# Patient Record
Sex: MALE | Race: WHITE | NOT HISPANIC OR LATINO | Employment: UNEMPLOYED | URBAN - METROPOLITAN AREA
[De-identification: names, ages, dates, MRNs, and addresses within clinical notes are randomized per-mention and may not be internally consistent; named-entity substitution may affect disease eponyms.]

---

## 2023-04-10 ENCOUNTER — APPOINTMENT (OUTPATIENT)
Dept: PRIMARY CARE | Facility: CLINIC | Age: 58
End: 2023-04-10
Payer: COMMERCIAL

## 2023-04-10 PROBLEM — M54.59 INTRACTABLE LOW BACK PAIN: Status: ACTIVE | Noted: 2023-04-10

## 2023-04-10 PROBLEM — R29.898 LOWER EXTREMITY WEAKNESS: Status: ACTIVE | Noted: 2023-04-10

## 2023-04-10 PROBLEM — S22.070D COMPRESSION FRACTURE OF T9 VERTEBRA WITH ROUTINE HEALING: Status: ACTIVE | Noted: 2023-04-10

## 2023-04-10 PROBLEM — J30.89 ENVIRONMENTAL AND SEASONAL ALLERGIES: Status: ACTIVE | Noted: 2023-04-10

## 2023-04-10 PROBLEM — I10 ESSENTIAL HYPERTENSION: Status: ACTIVE | Noted: 2023-04-10

## 2023-04-10 PROBLEM — D64.9 ANEMIA: Status: ACTIVE | Noted: 2023-04-10

## 2023-04-10 PROBLEM — F32.A ANXIETY AND DEPRESSION: Status: ACTIVE | Noted: 2023-04-10

## 2023-04-10 PROBLEM — K58.0 IRRITABLE BOWEL SYNDROME WITH DIARRHEA: Status: ACTIVE | Noted: 2023-04-10

## 2023-04-10 PROBLEM — R51.9 HEADACHE: Status: ACTIVE | Noted: 2023-04-10

## 2023-04-10 PROBLEM — F41.9 ANXIETY AND DEPRESSION: Status: ACTIVE | Noted: 2023-04-10

## 2023-04-10 RX ORDER — AMLODIPINE BESYLATE 5 MG/1
1 TABLET ORAL DAILY
COMMUNITY
Start: 2022-05-26 | End: 2024-02-08

## 2023-04-10 RX ORDER — FLUTICASONE PROPIONATE 50 MCG
2 SPRAY, SUSPENSION (ML) NASAL DAILY
COMMUNITY
Start: 2020-11-12 | End: 2023-08-02

## 2023-04-10 RX ORDER — ESCITALOPRAM OXALATE 10 MG/1
1 TABLET ORAL DAILY
COMMUNITY
Start: 2022-05-26 | End: 2023-07-28 | Stop reason: SDUPTHER

## 2023-04-10 NOTE — ASSESSMENT & PLAN NOTE
Lisinopril: near-syncope. Hydralazine: syncope. Hydrochlorothiazide: syncope  Borderline control. Continue amlodipine at current dose of 5mg. Monitor and record blood pressure (BP) and heart rate (HR) at home.  Did not tolerate several other antihypertensives, due to syncope.

## 2023-04-17 ENCOUNTER — APPOINTMENT (OUTPATIENT)
Dept: PRIMARY CARE | Facility: CLINIC | Age: 58
End: 2023-04-17
Payer: COMMERCIAL

## 2023-07-28 DIAGNOSIS — F32.A ANXIETY AND DEPRESSION: ICD-10-CM

## 2023-07-28 DIAGNOSIS — F41.9 ANXIETY AND DEPRESSION: ICD-10-CM

## 2023-07-28 RX ORDER — ESCITALOPRAM OXALATE 10 MG/1
10 TABLET ORAL DAILY
Qty: 30 TABLET | Refills: 0 | Status: SHIPPED | OUTPATIENT
Start: 2023-07-28 | End: 2023-10-30

## 2023-07-28 NOTE — TELEPHONE ENCOUNTER
MEDICATION REFILL    Pharmacy Name:  [facility]  Medication requested   Escitalopram 10 mg  Dosage   1x daily  Quantity   5 day hold-over until ov on 8/1  Allergies     Date of last visit     02/2023  Date of Next Appointment   08/01/2023

## 2023-08-01 ENCOUNTER — APPOINTMENT (OUTPATIENT)
Dept: PRIMARY CARE | Facility: CLINIC | Age: 58
End: 2023-08-01

## 2023-08-02 DIAGNOSIS — J30.89 OTHER ALLERGIC RHINITIS: ICD-10-CM

## 2023-08-02 RX ORDER — FLUTICASONE PROPIONATE 50 MCG
SPRAY, SUSPENSION (ML) NASAL
Qty: 48 ML | Refills: 1 | Status: SHIPPED | OUTPATIENT
Start: 2023-08-02

## 2023-08-03 ENCOUNTER — OFFICE VISIT (OUTPATIENT)
Dept: PRIMARY CARE | Facility: CLINIC | Age: 58
End: 2023-08-03
Payer: COMMERCIAL

## 2023-08-03 VITALS
DIASTOLIC BLOOD PRESSURE: 75 MMHG | SYSTOLIC BLOOD PRESSURE: 135 MMHG | WEIGHT: 232.13 LBS | OXYGEN SATURATION: 96 % | BODY MASS INDEX: 33.23 KG/M2 | HEIGHT: 70 IN | HEART RATE: 64 BPM

## 2023-08-03 DIAGNOSIS — Z12.5 SCREENING FOR PROSTATE CANCER: Primary | ICD-10-CM

## 2023-08-03 DIAGNOSIS — I10 ESSENTIAL HYPERTENSION: ICD-10-CM

## 2023-08-03 DIAGNOSIS — F32.A ANXIETY AND DEPRESSION: ICD-10-CM

## 2023-08-03 DIAGNOSIS — F41.9 ANXIETY AND DEPRESSION: ICD-10-CM

## 2023-08-03 DIAGNOSIS — R73.9 HYPERGLYCEMIA: ICD-10-CM

## 2023-08-03 DIAGNOSIS — Z11.59 NEED FOR HEPATITIS C SCREENING TEST: ICD-10-CM

## 2023-08-03 PROCEDURE — 1036F TOBACCO NON-USER: CPT | Performed by: FAMILY MEDICINE

## 2023-08-03 PROCEDURE — 99214 OFFICE O/P EST MOD 30 MIN: CPT | Performed by: FAMILY MEDICINE

## 2023-08-03 PROCEDURE — 3078F DIAST BP <80 MM HG: CPT | Performed by: FAMILY MEDICINE

## 2023-08-03 PROCEDURE — 3075F SYST BP GE 130 - 139MM HG: CPT | Performed by: FAMILY MEDICINE

## 2023-08-03 ASSESSMENT — ENCOUNTER SYMPTOMS
DIARRHEA: 0
ABDOMINAL PAIN: 0
DIFFICULTY URINATING: 0
FATIGUE: 1
NAUSEA: 0
COUGH: 0
WEAKNESS: 0
DYSURIA: 0
WHEEZING: 0
CHILLS: 0
CONFUSION: 0
NUMBNESS: 0
DIZZINESS: 0
LIGHT-HEADEDNESS: 0
VOMITING: 0
FEVER: 0
UNEXPECTED WEIGHT CHANGE: 0
TROUBLE SWALLOWING: 0
BLOOD IN STOOL: 0
SHORTNESS OF BREATH: 0

## 2023-08-03 NOTE — PROGRESS NOTES
"Subjective   Patient ID: Clifford Recinos is a 58 y.o. male who presents for lab follow up (Pt presents with spouse in lab F/U, medication review, no refills needed.BL).  HPI    C/o persistent pain, fatigue. A bit better. Feels better with taking blackstrap molasses.       Review of Systems   Constitutional:  Positive for fatigue. Negative for chills, fever and unexpected weight change.   HENT:  Negative for ear pain and trouble swallowing.    Respiratory:  Negative for cough, shortness of breath and wheezing.    Cardiovascular:  Negative for chest pain.   Gastrointestinal:  Negative for abdominal pain, blood in stool, diarrhea, nausea and vomiting.   Genitourinary:  Negative for difficulty urinating and dysuria.   Skin:  Negative for rash.   Neurological:  Negative for dizziness, syncope, weakness, light-headedness and numbness.   Psychiatric/Behavioral:  Negative for behavioral problems and confusion.          Objective   /75   Pulse 64   Ht 1.765 m (5' 9.5\")   Wt 105 kg (232 lb 2 oz)   SpO2 96%   BMI 33.79 kg/m²     Physical Exam  Vitals and nursing note reviewed.   Constitutional:       General: He is not in acute distress.     Appearance: Normal appearance.   HENT:      Head: Normocephalic and atraumatic.   Eyes:      General: No scleral icterus.     Extraocular Movements: Extraocular movements intact.      Conjunctiva/sclera: Conjunctivae normal.   Pulmonary:      Effort: Pulmonary effort is normal. No respiratory distress.   Skin:     General: Skin is warm and dry.      Coloration: Skin is not jaundiced.   Neurological:      Mental Status: He is alert and oriented to person, place, and time. Mental status is at baseline.   Psychiatric:         Mood and Affect: Mood normal.         Thought Content: Thought content normal.         Assessment/Plan   Problem List Items Addressed This Visit       Anxiety and depression    Essential hypertension    Relevant Orders    Comprehensive Metabolic Panel    TSH with " reflex to Free T4 if abnormal    Magnesium    Lipid Panel    Need for hepatitis C screening test    Screening for prostate cancer - Primary    Relevant Orders    Prostate Specific Antigen, Screen    Hyperglycemia    Relevant Orders    Hemoglobin A1C

## 2023-08-03 NOTE — PATIENT INSTRUCTIONS
Please return for a follow-up appointment and Wellness visit in 3 months, earlier if any question or concern.     For assistance with scheduling referrals or consultations, please call [phone]. For laboratory, radiology, and other tests, please call [phone] ([phone] for pediatrics). Please review prescription inserts and published information for possible adverse effects. Return after testing or consultation to review results and recommendations, if symptoms persist, change, worsen, or return, or if you have any question or concern. For non-emergencies, you may call the office. For emergencies, call 9-1-1 or go to the nearest Emergency Department. Please schedule additional appointment(s) to address concern(s) not addressed today.    In general, results will not be released or discussed over the telephone. Results of tests done through [facility] are released via  SimilarWeb:  [email or web address]    Until we complete our transition to the new system, additional information can be found at [email or web address] or on your Android or iOS (iPhone, iPad) device using the [employer] neal available free of charge in your device's neal store.

## 2023-10-28 DIAGNOSIS — F41.9 ANXIETY AND DEPRESSION: ICD-10-CM

## 2023-10-28 DIAGNOSIS — F32.A ANXIETY AND DEPRESSION: ICD-10-CM

## 2023-10-30 PROBLEM — M79.674 PAIN IN TOES OF BOTH FEET: Status: RESOLVED | Noted: 2019-10-14 | Resolved: 2023-10-30

## 2023-10-30 PROBLEM — S92.502A FRACTURE OF FOURTH TOE, LEFT, CLOSED, INITIAL ENCOUNTER: Status: RESOLVED | Noted: 2019-10-18 | Resolved: 2023-10-30

## 2023-10-30 PROBLEM — J45.909 ASTHMA WITHOUT STATUS ASTHMATICUS (HHS-HCC): Status: ACTIVE | Noted: 2023-10-30

## 2023-10-30 PROBLEM — S92.502D: Status: RESOLVED | Noted: 2019-10-14 | Resolved: 2023-10-30

## 2023-10-30 PROBLEM — B35.1 ONYCHOMYCOSIS: Status: ACTIVE | Noted: 2019-10-14

## 2023-10-30 PROBLEM — M79.675 PAIN IN TOES OF BOTH FEET: Status: RESOLVED | Noted: 2019-10-14 | Resolved: 2023-10-30

## 2023-10-30 RX ORDER — ESCITALOPRAM OXALATE 10 MG/1
10 TABLET ORAL DAILY
Qty: 90 TABLET | Refills: 1 | Status: SHIPPED | OUTPATIENT
Start: 2023-10-30 | End: 2024-05-15

## 2023-12-28 ENCOUNTER — APPOINTMENT (OUTPATIENT)
Dept: LAB | Facility: HOSPITAL | Age: 58
End: 2023-12-28
Payer: COMMERCIAL

## 2023-12-28 ENCOUNTER — OFFICE VISIT (OUTPATIENT)
Dept: HEMATOLOGY/ONCOLOGY | Facility: CLINIC | Age: 58
End: 2023-12-28
Payer: COMMERCIAL

## 2023-12-28 VITALS
TEMPERATURE: 98.2 F | DIASTOLIC BLOOD PRESSURE: 90 MMHG | BODY MASS INDEX: 33.21 KG/M2 | HEIGHT: 71 IN | RESPIRATION RATE: 16 BRPM | HEART RATE: 66 BPM | WEIGHT: 237.22 LBS | OXYGEN SATURATION: 95 % | SYSTOLIC BLOOD PRESSURE: 177 MMHG

## 2023-12-28 DIAGNOSIS — Z12.5 SCREENING FOR PROSTATE CANCER: ICD-10-CM

## 2023-12-28 DIAGNOSIS — I10 ESSENTIAL HYPERTENSION: ICD-10-CM

## 2023-12-28 DIAGNOSIS — D64.9 ANEMIA, UNSPECIFIED TYPE: Primary | ICD-10-CM

## 2023-12-28 DIAGNOSIS — R73.9 HYPERGLYCEMIA: ICD-10-CM

## 2023-12-28 LAB
ALBUMIN SERPL BCP-MCNC: 4.6 G/DL (ref 3.4–5)
ALP SERPL-CCNC: 45 U/L (ref 33–120)
ALT SERPL W P-5'-P-CCNC: 21 U/L (ref 10–52)
ANION GAP SERPL CALC-SCNC: 14 MMOL/L (ref 10–20)
AST SERPL W P-5'-P-CCNC: 18 U/L (ref 9–39)
BASOPHILS # BLD AUTO: 0.04 X10*3/UL (ref 0–0.1)
BASOPHILS NFR BLD AUTO: 1.1 %
BILIRUB SERPL-MCNC: 0.7 MG/DL (ref 0–1.2)
BUN SERPL-MCNC: 12 MG/DL (ref 6–23)
CALCIUM SERPL-MCNC: 9.5 MG/DL (ref 8.6–10.3)
CHLORIDE SERPL-SCNC: 103 MMOL/L (ref 98–107)
CHOLEST SERPL-MCNC: 145 MG/DL (ref 0–199)
CHOLESTEROL/HDL RATIO: 2.8
CO2 SERPL-SCNC: 26 MMOL/L (ref 21–32)
CREAT SERPL-MCNC: 0.58 MG/DL (ref 0.5–1.3)
EOSINOPHIL # BLD AUTO: 0.09 X10*3/UL (ref 0–0.7)
EOSINOPHIL NFR BLD AUTO: 2.5 %
ERYTHROCYTE [DISTWIDTH] IN BLOOD BY AUTOMATED COUNT: 14.8 % (ref 11.5–14.5)
FERRITIN SERPL-MCNC: 236 NG/ML (ref 20–300)
GFR SERPL CREATININE-BSD FRML MDRD: >90 ML/MIN/1.73M*2
GLUCOSE SERPL-MCNC: 200 MG/DL (ref 74–99)
HCT VFR BLD AUTO: 33.5 % (ref 41–52)
HDLC SERPL-MCNC: 51.5 MG/DL
HGB BLD-MCNC: 11 G/DL (ref 13.5–17.5)
IMM GRANULOCYTES # BLD AUTO: 0.02 X10*3/UL (ref 0–0.7)
IMM GRANULOCYTES NFR BLD AUTO: 0.5 % (ref 0–0.9)
IRON SATN MFR SERPL: 36 % (ref 25–45)
IRON SERPL-MCNC: 118 UG/DL (ref 35–150)
LDLC SERPL CALC-MCNC: 58 MG/DL
LYMPHOCYTES # BLD AUTO: 0.71 X10*3/UL (ref 1.2–4.8)
LYMPHOCYTES NFR BLD AUTO: 19.3 %
MAGNESIUM SERPL-MCNC: 1.99 MG/DL (ref 1.6–2.4)
MCH RBC QN AUTO: 34 PG (ref 26–34)
MCHC RBC AUTO-ENTMCNC: 32.8 G/DL (ref 32–36)
MCV RBC AUTO: 103 FL (ref 80–100)
MONOCYTES # BLD AUTO: 0.22 X10*3/UL (ref 0.1–1)
MONOCYTES NFR BLD AUTO: 6 %
NEUTROPHILS # BLD AUTO: 2.59 X10*3/UL (ref 1.2–7.7)
NEUTROPHILS NFR BLD AUTO: 70.6 %
NON HDL CHOLESTEROL: 94 MG/DL (ref 0–149)
PLATELET # BLD AUTO: 346 X10*3/UL (ref 150–450)
POTASSIUM SERPL-SCNC: 3.9 MMOL/L (ref 3.5–5.3)
PROT SERPL-MCNC: 6.9 G/DL (ref 6.4–8.2)
RBC # BLD AUTO: 3.24 X10*6/UL (ref 4.5–5.9)
SODIUM SERPL-SCNC: 139 MMOL/L (ref 136–145)
TIBC SERPL-MCNC: 325 UG/DL (ref 240–445)
TRIGL SERPL-MCNC: 180 MG/DL (ref 0–149)
TSH SERPL-ACNC: 1.1 MIU/L (ref 0.44–3.98)
UIBC SERPL-MCNC: 207 UG/DL (ref 110–370)
VLDL: 36 MG/DL (ref 0–40)
WBC # BLD AUTO: 3.7 X10*3/UL (ref 4.4–11.3)

## 2023-12-28 PROCEDURE — 83540 ASSAY OF IRON: CPT | Performed by: PHYSICIAN ASSISTANT

## 2023-12-28 PROCEDURE — 85025 COMPLETE CBC W/AUTO DIFF WBC: CPT | Performed by: PHYSICIAN ASSISTANT

## 2023-12-28 PROCEDURE — 99214 OFFICE O/P EST MOD 30 MIN: CPT | Performed by: PHYSICIAN ASSISTANT

## 2023-12-28 PROCEDURE — 80053 COMPREHEN METABOLIC PANEL: CPT | Performed by: PHYSICIAN ASSISTANT

## 2023-12-28 PROCEDURE — 82728 ASSAY OF FERRITIN: CPT | Performed by: PHYSICIAN ASSISTANT

## 2023-12-28 PROCEDURE — 82607 VITAMIN B-12: CPT | Performed by: PHYSICIAN ASSISTANT

## 2023-12-28 PROCEDURE — 3080F DIAST BP >= 90 MM HG: CPT | Performed by: PHYSICIAN ASSISTANT

## 2023-12-28 PROCEDURE — 1036F TOBACCO NON-USER: CPT | Performed by: PHYSICIAN ASSISTANT

## 2023-12-28 PROCEDURE — 84153 ASSAY OF PSA TOTAL: CPT | Performed by: PHYSICIAN ASSISTANT

## 2023-12-28 PROCEDURE — 83036 HEMOGLOBIN GLYCOSYLATED A1C: CPT | Performed by: PHYSICIAN ASSISTANT

## 2023-12-28 PROCEDURE — 3077F SYST BP >= 140 MM HG: CPT | Performed by: PHYSICIAN ASSISTANT

## 2023-12-28 PROCEDURE — 80061 LIPID PANEL: CPT | Performed by: PHYSICIAN ASSISTANT

## 2023-12-28 PROCEDURE — 83735 ASSAY OF MAGNESIUM: CPT | Performed by: PHYSICIAN ASSISTANT

## 2023-12-28 PROCEDURE — 84443 ASSAY THYROID STIM HORMONE: CPT | Performed by: PHYSICIAN ASSISTANT

## 2023-12-28 PROCEDURE — 36415 COLL VENOUS BLD VENIPUNCTURE: CPT | Performed by: PHYSICIAN ASSISTANT

## 2023-12-28 ASSESSMENT — PAIN SCALES - GENERAL: PAINLEVEL: 7

## 2023-12-28 NOTE — PROGRESS NOTES
Patient Visit Information:   Visit Type: Benign Heme Follow-up     Cancer History:   Treatment Synopsis:    pt is a pleasant 57-year-old  male was referred by Dr. De for evaluation of leukopenia and normocytic anemia     pt has been feeling tired for many years and has been progressively worsened. c/o leg and join and whole body muscle achiness. had lots of fracture in the past.     cbc in 6/2022 showed hgb 11.3, down from 12.5 in 4/2022. stated was also found to have anemic 2 years ago at [facility].    denies bleeding. last colonoscopy was maybe 6 yrs ago per pt. otherwise doing fine denies fever chills night sweats decreased appetite weight loss palpitation chest pain nausea vomiting diarrhea constipation abdominal pain or urinary symptoms    Past medical history anxiety depression, hypertension, back pain, irritable bowel syndrome, compression fracture of T9 vertebra,     Past surgical history appendectomy cholecystectomy    Family history prostate cancer      History of Present Illness:   pt has been feeling tired for many years and has been progressively worsening. Having flares of IBS that worsen his fatigue. Changed from Zoloft to Lexapro due to cost and doesn't seem to be helping him. C/F cardiac issue due to low BP and HR. c/o leg and join and whole body muscle achiness. Didn't go to his rheum appointment. Otherwise doing well.     Review of Systems:    All of the systems have been reviewed and are negative for complaints except what is stated in the HPI and/or past medical history    Allergies and Intolerances:       Allergies:   nsaids: Drug, Mood Alteration, Active   doxycycline: Drug, Other, Active       Intolerances:   Percocet: Drug, Mood Alteration, Active    Outpatient Medication Profile:  * Patient Currently Takes Medications as of 03-Jul-2023 14:08 documented in Structured Notes   escitalopram 10 mg oral tablet: Last Dose Taken:  , 1 tab(s) orally once a day   amLODIPine 5 mg oral tablet:  "Last Dose Taken:  , 1 tab(s) orally once a day      Family History: No Family History items are recorded in the problem list.     Social History:   Social Substance History:  · Smoking Status never smoker (1)   · Tobacco Use denies   · Alcohol Use denies     Visit Vitals  /90 (BP Location: Left arm)   Pulse 66   Temp 36.8 °C (98.2 °F)   Resp 16   Ht 1.801 m (5' 10.91\")   Wt 108 kg (237 lb 3.4 oz)   SpO2 95%   BMI 33.17 kg/m²   Smoking Status Never   BSA 2.32 m²      PHYSICAL EXAM:  Constitutional: alert, awake and oriented, not in acute distress   HEENT: moist mucous membranes, normal nose   Neck: supple, no lymphadenopathy   EYES: PERRL, EOM intact, conjunctiva normal  Skin: no jaundice, rash or erythema  Neurological: AAOx3, no gross focal deficit   Psychiatric: normal mood and behavior     Labs:  Lab Results   Component Value Date    WBC 3.7 (L) 12/28/2023    NEUTROABS 2.59 12/28/2023    IGABSOL 0.02 12/28/2023    LYMPHSABS 0.71 (L) 12/28/2023    MONOSABS 0.22 12/28/2023    EOSABS 0.09 12/28/2023    BASOSABS 0.04 12/28/2023    RBC 3.24 (L) 12/28/2023     (H) 12/28/2023    MCHC 32.8 12/28/2023    HGB 11.0 (L) 12/28/2023    HCT 33.5 (L) 12/28/2023     12/28/2023     Lab Results   Component Value Date    RETICCTPCT 2.0 08/10/2022      Lab Results   Component Value Date    CREATININE 0.58 12/28/2023    BUN 12 12/28/2023    EGFR >90 12/28/2023     12/28/2023    K 3.9 12/28/2023     12/28/2023    CO2 26 12/28/2023      Lab Results   Component Value Date    ALT 21 12/28/2023    AST 18 12/28/2023    ALKPHOS 45 12/28/2023    BILITOT 0.7 12/28/2023      Lab Results   Component Value Date    TSH 1.10 12/28/2023     Lab Results   Component Value Date    TSH 1.10 12/28/2023     Lab Results   Component Value Date    IRON 118 12/28/2023    TIBC 325 12/28/2023    FERRITIN 236 12/28/2023      Lab Results   Component Value Date    QCYHVABU38 775 07/03/2023      Lab Results   Component Value Date    " "FOLATE >24.0 08/10/2022     Lab Results   Component Value Date    KENIA POSITIVE (A) 08/10/2022    RF <10 01/09/2023    SEDRATE 10 08/10/2022      Lab Results   Component Value Date    CRP 0.40 08/10/2022      No results found for: \"TIFFANIE\"  Lab Results   Component Value Date     08/10/2022     No results found for: \"HAPTOGLOBIN\"  Lab Results   Component Value Date    SPEP NORMAL 08/10/2022       Assessment:    57-year-old  male presented with long standing h/o normocytic anemia and leukopenia mainly lymphocytopenia    Compression fracture of thoracic spine    Fatigue disproportional to the degree of the anemia, weakness and body achiness, +KENIA      Plan:    Discussed with the patient and wife in detail regarding diagnosis etiologies of anemia    Cards and GI referral    F/u w/ophtho     will consider BM bx if hgb lower than 10 or if no other etiology has been found for his fatigue. Currently having no B symptoms.     Will repeat CBC in 6 m    f/u w/PCP    Patient verbalized understanding, and all his questions were answered to his satisfaction    RTC in 6 months    30 min spent with patient greater than 50 % of which was spent in consultation and coordination of care.  "

## 2023-12-29 ENCOUNTER — TELEPHONE (OUTPATIENT)
Dept: PRIMARY CARE | Facility: CLINIC | Age: 58
End: 2023-12-29

## 2023-12-29 DIAGNOSIS — R73.9 HYPERGLYCEMIA: Primary | ICD-10-CM

## 2023-12-29 LAB
EST. AVERAGE GLUCOSE BLD GHB EST-MCNC: 108 MG/DL
HBA1C MFR BLD: 5.4 %
PSA SERPL-MCNC: <0.1 NG/ML
VIT B12 SERPL-MCNC: 749 PG/ML (ref 211–911)

## 2023-12-29 NOTE — TELEPHONE ENCOUNTER
----- Message from Lacho Hart DO sent at 12/29/2023  1:08 AM EST -----  Please let patient know that his glucose is elevated to the diabetic range, but his A1c is normal. Recommend a low-sugar, low-simple-carbohydrate, low-cholesterol, heart-healthy diet and lifestyle, and regular cardio exercise and weight loss as appropriate.    Recommend a glucose tolerance test now (eat his normal diet in the days leading up to the test, then fast for 8 hours before the test), and rechecking his A1c in 3 months, with a follow-up appointment a couple days after tests, to review results and options, earlier if any question or concern.

## 2024-01-10 ENCOUNTER — TELEPHONE (OUTPATIENT)
Dept: PRIMARY CARE | Facility: CLINIC | Age: 59
End: 2024-01-10
Payer: COMMERCIAL

## 2024-01-10 NOTE — TELEPHONE ENCOUNTER
----- Message from Lacho Hart DO sent at 12/31/2023 10:26 PM EST -----  Recommend a follow-up appointment.  ----- Message -----  From: Mag Quinteros MA  Sent: 12/29/2023  12:25 PM EST  To: Lacho Hart DO    Pt did eat prior to getting labs drawn  Pt has a lot questions for you about the undetectable PSA  ----- Message -----  From: Lacho Hart DO  Sent: 12/29/2023  11:52 AM EST  To: #    PSA (prostate-specific antigen) is undetectable, but I don't see in his chart that he has had his prostate removed. Has he in fact had a total prostatectomy? If not, then recommend follow-up with an endocrinologist.    Endocrinology  Dr. Harish Henry ([facility] [phone]  Dr. Govind Antonio, Dr. Isis Pritchett, Dr. Xochilt Poole ([place]) [phone]  Shelly Blair CNP, Dr. Gabriel Sharma, Dr. Tasha Leal, Dr. [facility] [phone]

## 2024-01-11 ENCOUNTER — OFFICE VISIT (OUTPATIENT)
Dept: CARDIOLOGY | Facility: HOSPITAL | Age: 59
End: 2024-01-11
Payer: COMMERCIAL

## 2024-01-11 VITALS
SYSTOLIC BLOOD PRESSURE: 157 MMHG | WEIGHT: 239.2 LBS | BODY MASS INDEX: 33.45 KG/M2 | HEART RATE: 53 BPM | DIASTOLIC BLOOD PRESSURE: 79 MMHG | OXYGEN SATURATION: 97 %

## 2024-01-11 DIAGNOSIS — D64.9 ANEMIA, UNSPECIFIED TYPE: ICD-10-CM

## 2024-01-11 DIAGNOSIS — I49.3 VENTRICULAR PREMATURE DEPOLARIZATION: ICD-10-CM

## 2024-01-11 DIAGNOSIS — I10 ESSENTIAL HYPERTENSION: Primary | ICD-10-CM

## 2024-01-11 DIAGNOSIS — Z12.5 SCREENING FOR PROSTATE CANCER: ICD-10-CM

## 2024-01-11 DIAGNOSIS — I49.3 PVC (PREMATURE VENTRICULAR CONTRACTION): ICD-10-CM

## 2024-01-11 DIAGNOSIS — R00.2 PALPITATIONS: ICD-10-CM

## 2024-01-11 DIAGNOSIS — R73.9 HYPERGLYCEMIA: ICD-10-CM

## 2024-01-11 PROCEDURE — 93005 ELECTROCARDIOGRAM TRACING: CPT | Performed by: INTERNAL MEDICINE

## 2024-01-11 PROCEDURE — 3078F DIAST BP <80 MM HG: CPT | Performed by: INTERNAL MEDICINE

## 2024-01-11 PROCEDURE — 99204 OFFICE O/P NEW MOD 45 MIN: CPT | Performed by: INTERNAL MEDICINE

## 2024-01-11 PROCEDURE — 99214 OFFICE O/P EST MOD 30 MIN: CPT | Mod: 25 | Performed by: INTERNAL MEDICINE

## 2024-01-11 PROCEDURE — 93010 ELECTROCARDIOGRAM REPORT: CPT | Performed by: INTERNAL MEDICINE

## 2024-01-11 PROCEDURE — 99213 OFFICE O/P EST LOW 20 MIN: CPT | Performed by: INTERNAL MEDICINE

## 2024-01-11 PROCEDURE — 1036F TOBACCO NON-USER: CPT | Performed by: INTERNAL MEDICINE

## 2024-01-11 PROCEDURE — 3077F SYST BP >= 140 MM HG: CPT | Performed by: INTERNAL MEDICINE

## 2024-02-01 ENCOUNTER — HOSPITAL ENCOUNTER (OUTPATIENT)
Dept: CARDIOLOGY | Facility: HOSPITAL | Age: 59
Discharge: HOME | End: 2024-02-01
Payer: COMMERCIAL

## 2024-02-01 DIAGNOSIS — I49.3 PVC (PREMATURE VENTRICULAR CONTRACTION): ICD-10-CM

## 2024-02-01 DIAGNOSIS — I49.3 VENTRICULAR PREMATURE DEPOLARIZATION: ICD-10-CM

## 2024-02-01 LAB
AORTIC VALVE MEAN GRADIENT: 5 MMHG
AORTIC VALVE PEAK VELOCITY: 1.39 M/S
AV PEAK GRADIENT: 7.7 MMHG
AVA (PEAK VEL): 2.28 CM2
AVA (VTI): 2.29 CM2
EJECTION FRACTION APICAL 4 CHAMBER: 65.1
EJECTION FRACTION: 63 %
LEFT ATRIUM VOLUME AREA LENGTH INDEX BSA: 35.8 ML/M2
LEFT VENTRICLE INTERNAL DIMENSION DIASTOLE: 5.8 CM (ref 3.5–6)
LEFT VENTRICULAR OUTFLOW TRACT DIAMETER: 2 CM
MITRAL VALVE E/A RATIO: 2.33
MITRAL VALVE E/E' RATIO: 9.01
RIGHT VENTRICLE FREE WALL PEAK S': 16.2 CM/S
RIGHT VENTRICLE PEAK SYSTOLIC PRESSURE: 31.9 MMHG
TRICUSPID ANNULAR PLANE SYSTOLIC EXCURSION: 3.3 CM

## 2024-02-01 PROCEDURE — 93306 TTE W/DOPPLER COMPLETE: CPT | Performed by: INTERNAL MEDICINE

## 2024-02-01 PROCEDURE — 93018 CV STRESS TEST I&R ONLY: CPT | Performed by: STUDENT IN AN ORGANIZED HEALTH CARE EDUCATION/TRAINING PROGRAM

## 2024-02-01 PROCEDURE — 93306 TTE W/DOPPLER COMPLETE: CPT

## 2024-02-01 PROCEDURE — 93017 CV STRESS TEST TRACING ONLY: CPT

## 2024-02-01 PROCEDURE — 93016 CV STRESS TEST SUPVJ ONLY: CPT | Performed by: STUDENT IN AN ORGANIZED HEALTH CARE EDUCATION/TRAINING PROGRAM

## 2024-02-06 ENCOUNTER — OFFICE VISIT (OUTPATIENT)
Dept: PRIMARY CARE | Facility: CLINIC | Age: 59
End: 2024-02-06
Payer: COMMERCIAL

## 2024-02-06 VITALS
HEIGHT: 70 IN | WEIGHT: 241.6 LBS | BODY MASS INDEX: 34.59 KG/M2 | TEMPERATURE: 98.3 F | DIASTOLIC BLOOD PRESSURE: 80 MMHG | OXYGEN SATURATION: 97 % | HEART RATE: 74 BPM | SYSTOLIC BLOOD PRESSURE: 136 MMHG

## 2024-02-06 DIAGNOSIS — J01.40 ACUTE NON-RECURRENT PANSINUSITIS: Primary | ICD-10-CM

## 2024-02-06 DIAGNOSIS — R97.20 ABNORMAL PSA: ICD-10-CM

## 2024-02-06 PROCEDURE — 1036F TOBACCO NON-USER: CPT | Performed by: FAMILY MEDICINE

## 2024-02-06 PROCEDURE — 3075F SYST BP GE 130 - 139MM HG: CPT | Performed by: FAMILY MEDICINE

## 2024-02-06 PROCEDURE — 3079F DIAST BP 80-89 MM HG: CPT | Performed by: FAMILY MEDICINE

## 2024-02-06 PROCEDURE — 99214 OFFICE O/P EST MOD 30 MIN: CPT | Performed by: FAMILY MEDICINE

## 2024-02-06 RX ORDER — CLONAZEPAM 1 MG/1
1 TABLET ORAL 2 TIMES DAILY PRN
COMMUNITY
Start: 2023-11-13

## 2024-02-06 RX ORDER — AMOXICILLIN AND CLAVULANATE POTASSIUM 875; 125 MG/1; MG/1
875 TABLET, FILM COATED ORAL 2 TIMES DAILY
Qty: 20 TABLET | Refills: 0 | Status: SHIPPED | OUTPATIENT
Start: 2024-02-06 | End: 2024-02-16

## 2024-02-06 ASSESSMENT — ENCOUNTER SYMPTOMS
SHORTNESS OF BREATH: 0
ABDOMINAL PAIN: 0
DIFFICULTY URINATING: 0
TROUBLE SWALLOWING: 0
SORE THROAT: 0
VOMITING: 0
CONFUSION: 0
RHINORRHEA: 0
NUMBNESS: 0
UNEXPECTED WEIGHT CHANGE: 0
FEVER: 0
BLOOD IN STOOL: 0
SINUS PRESSURE: 1
LIGHT-HEADEDNESS: 0
DIZZINESS: 0
DYSURIA: 0
SINUS PAIN: 1
WEAKNESS: 0
WHEEZING: 0
CHILLS: 0
DIARRHEA: 0
COUGH: 0
NAUSEA: 0

## 2024-02-06 NOTE — PROGRESS NOTES
"Subjective   Patient ID: Clifford Recinos is a 58 y.o. male who presents for Sinusitis (Sinus pressure and congestion ).  Sinusitis  Associated symptoms include congestion and sinus pressure. Pertinent negatives include no chills, coughing, ear pain, shortness of breath or sore throat.       c/o sinus congestion. Started with cold symptoms about 15 days ago with sore throat, sinus congestion, cough productive of phlegm. Facial pain/pressure bilateral frontal, ethmoidal, maxillary sinuses R>L.     Denies ill contacts.  Denies fever, chills, sweats, NVD, new SOB/difficulty breathing.    Review of Systems   Constitutional:  Negative for chills, fever and unexpected weight change.   HENT:  Positive for congestion, postnasal drip, sinus pressure and sinus pain. Negative for ear pain, rhinorrhea, sore throat and trouble swallowing.    Respiratory:  Negative for cough, shortness of breath and wheezing.    Cardiovascular:  Negative for chest pain.   Gastrointestinal:  Negative for abdominal pain, blood in stool, diarrhea, nausea and vomiting.   Genitourinary:  Negative for difficulty urinating and dysuria.   Skin:  Negative for rash.   Neurological:  Negative for dizziness, syncope, weakness, light-headedness and numbness.   Psychiatric/Behavioral:  Negative for behavioral problems and confusion.          Objective   /80   Pulse 74   Temp 36.8 °C (98.3 °F)   Ht 1.778 m (5' 10\")   Wt 110 kg (241 lb 9.6 oz)   SpO2 97%   BMI 34.67 kg/m²     Physical Exam  Vitals and nursing note reviewed.   Constitutional:       General: He is not in acute distress.     Appearance: Normal appearance.   HENT:      Head: Normocephalic and atraumatic.      Nose: Congestion present.   Eyes:      General: No scleral icterus.     Extraocular Movements: Extraocular movements intact.      Conjunctiva/sclera: Conjunctivae normal.   Pulmonary:      Effort: Pulmonary effort is normal. No respiratory distress.   Skin:     General: Skin is warm and " dry.      Coloration: Skin is not jaundiced.   Neurological:      Mental Status: He is alert and oriented to person, place, and time. Mental status is at baseline.   Psychiatric:         Behavior: Behavior normal.         Assessment/Plan   Problem List Items Addressed This Visit       Acute non-recurrent pansinusitis - Primary     Augmentin, supportive care.         Relevant Medications    amoxicillin-pot clavulanate (Augmentin) 875-125 mg tablet    Abnormal PSA     Undetectable despite no h/o prostatectomy. Consult endocrinology.         Relevant Orders    Referral to Endocrinology

## 2024-02-06 NOTE — PATIENT INSTRUCTIONS
Endocrinology  Dr. Harish Henry ([place]) [phone]  Dr. Isaias Palm ([place]) [phone]  Dr. Govind Antonio, Dr. Isis Pritchett, Dr. Xochilt Poole ([place]) [phone]  Shelly Blair CNP, Dr. Gabriel Sharma, Dr. Tasha Leal, [facility] ([place]) [phone]       Please return or seek medical attention if symptoms persist, change, worsen, or return. For emergencies, call 9-1-1 or go to the nearest Emergency Room.    Avoid taking Biotin for a week prior to any blood tests, as it can interfere with certain results. Fasting for labs means 12 hours, nothing to eat or drink, except water and medications, unless directed otherwise.    For assistance with scheduling referrals or consultations, please call [phone]. For laboratory, radiology, and other tests, please call [phone] ([phone] for pediatrics). Please review prescription inserts and published information for possible adverse effects of all medications. Return after testing or consultation to review results and recommendations, if symptoms persist, change, worsen, or return, or if you have any question or concern. If you do not get results within 7-10 days, or you have any question or concern, please send a message, call the office ([phone]), or return to the office for a follow-up appointment. For non-emergencies, you may call the office. For emergencies, call 9-1-1 or go to the nearest Emergency Department. Please schedule additional appointment(s) to address concern(s) not addressed today.    In general, results are not released or discussed over the telephone, but at an appointment or via  Food.ee. Results of tests done through [facility] are released via  Food.ee (see below).  [email or web address]   Food.ee support line: [phone]

## 2024-02-08 ENCOUNTER — APPOINTMENT (OUTPATIENT)
Dept: GASTROENTEROLOGY | Facility: CLINIC | Age: 59
End: 2024-02-08
Payer: COMMERCIAL

## 2024-02-08 DIAGNOSIS — I10 ESSENTIAL (PRIMARY) HYPERTENSION: ICD-10-CM

## 2024-02-08 RX ORDER — AMLODIPINE BESYLATE 5 MG/1
5 TABLET ORAL DAILY
Qty: 90 TABLET | Refills: 1 | Status: SHIPPED | OUTPATIENT
Start: 2024-02-08 | End: 2024-05-09

## 2024-02-17 LAB
ATRIAL RATE: 62 BPM
P AXIS: 76 DEGREES
P OFFSET: 158 MS
P ONSET: 117 MS
PR INTERVAL: 202 MS
Q ONSET: 218 MS
QRS COUNT: 10 BEATS
QRS DURATION: 102 MS
QT INTERVAL: 440 MS
QTC CALCULATION(BAZETT): 446 MS
QTC FREDERICIA: 445 MS
R AXIS: 31 DEGREES
T AXIS: 60 DEGREES
T OFFSET: 438 MS
VENTRICULAR RATE: 62 BPM

## 2024-03-12 RX ORDER — DILTIAZEM HYDROCHLORIDE 180 MG/1
180 CAPSULE, COATED, EXTENDED RELEASE ORAL DAILY
Qty: 30 CAPSULE | Refills: 11 | Status: SHIPPED | OUTPATIENT
Start: 2024-03-12 | End: 2024-05-09 | Stop reason: SINTOL

## 2024-03-12 ASSESSMENT — ENCOUNTER SYMPTOMS
EYES NEGATIVE: 1
CHEST TIGHTNESS: 1
MUSCULOSKELETAL NEGATIVE: 1
ENDOCRINE NEGATIVE: 1
CONSTITUTIONAL NEGATIVE: 1
GASTROINTESTINAL NEGATIVE: 1
HEMATOLOGIC/LYMPHATIC NEGATIVE: 1
ALLERGIC/IMMUNOLOGIC NEGATIVE: 1
PALPITATIONS: 1
LIGHT-HEADEDNESS: 1
PSYCHIATRIC NEGATIVE: 1

## 2024-03-12 NOTE — PROGRESS NOTES
Referred by Dr. Tran for New Patient Visit (Chest fluttering. SOB/lightheaded with exertion)     History Of Present Illness:    Clifford Recinos is a 58 y.o. male presenting for evaluation of of palpitations.  He notes the past few months, intermittent fluttering in his chest.  Has not been too bad the last few days, albeit can come out of the blue with increased frequency.  Not necessarily related to exertion, he does note occasional dizziness with the fluttering.      Past Medical History:  He has a past medical history of Fracture of fourth toe, left, closed, initial encounter (10/18/2019), Fracture of fourth toe, left, open, with routine healing, subsequent encounter (10/14/2019), Other instability, left knee (05/02/2018), Pain in left knee (05/02/2018), Pain in thoracic spine (02/21/2022), Unspecified injury of thorax, initial encounter (02/21/2022), and Wedge compression fracture of unspecified thoracic vertebra, initial encounter for closed fracture (CMS/[facility]) (02/23/2022).    Past Surgical History:  He has a past surgical history that includes Other surgical history (02/21/2022) and Other surgical history (02/21/2022).      Social History:  He reports that he has never smoked. He has never used smokeless tobacco. He reports current alcohol use. He reports that he does not use drugs.    Family History:  Family History   Problem Relation Name Age of Onset    Other (CABG) Mother      Other (cardiac pacemaker) Father      Other (malignant neoplasm of prostate) Father      Other (sepsis) Father      Other (stroke syndrome) Father      Other (PTCA) Sister          Allergies:  Ketorolac tromethamine, Sudafed [pseudoephedrine hcl], Cefdinir, Levofloxacin, Nsaids (non-steroidal anti-inflammatory drug), Oxycodone-acetaminophen, Propoxyphene n-acetaminophen, and Doxycycline    Outpatient Medications:  Current Outpatient Medications   Medication Instructions    amLODIPine (NORVASC) 5 mg, oral, Daily    clonazePAM (KLONOPIN)  1 mg, oral, 2 times daily PRN    escitalopram (LEXAPRO) 10 mg, oral, Daily    fluticasone (Flonase) 50 mcg/actuation nasal spray USE 1 SPRAY EACH NOSTRIL TWICE DAILY - OR - USE 2 SPRAYS EACH NOSTRIL ONCE DAILY.        Last Recorded Vitals:  Vitals:    01/11/24 1148   BP: 157/79   Pulse: 53   SpO2: 97%   Weight: 109 kg (239 lb 3.2 oz)     Review of Systems   Constitutional: Negative.    HENT: Negative.     Eyes: Negative.    Respiratory:  Positive for chest tightness.    Cardiovascular:  Positive for palpitations.   Gastrointestinal: Negative.    Endocrine: Negative.    Genitourinary: Negative.    Musculoskeletal: Negative.    Skin: Negative.    Allergic/Immunologic: Negative.    Neurological:  Positive for light-headedness.   Hematological: Negative.    Psychiatric/Behavioral: Negative.          Physical Exam:  Constitutional:       Appearance: Healthy appearance. Not in distress.   Neck:      Vascular: No JVR. JVD normal.   Pulmonary:      Effort: Pulmonary effort is normal.      Breath sounds: Normal breath sounds. No wheezing. No rhonchi. No rales.   Chest:      Chest wall: Not tender to palpatation.   Cardiovascular:      Normal rate. Regularly irregular rhythm.      Murmurs: There is no murmur.   Edema:     Peripheral edema absent.   Abdominal:      General: Bowel sounds are normal.      Palpations: Abdomen is soft.      Tenderness: There is no abdominal tenderness.   Musculoskeletal: Normal range of motion. Skin:     General: Skin is warm and dry.   Neurological:      General: No focal deficit present.      Mental Status: Alert and oriented to person, place and time.             Last Labs:  CBC -  Lab Results   Component Value Date    WBC 3.7 (L) 12/28/2023    HGB 11.0 (L) 12/28/2023    HCT 33.5 (L) 12/28/2023     (H) 12/28/2023     12/28/2023       CMP -  Lab Results   Component Value Date    CALCIUM 9.5 12/28/2023    PROT 6.9 12/28/2023    ALBUMIN 4.6 12/28/2023    AST 18 12/28/2023    ALT 21  12/28/2023    ALKPHOS 45 12/28/2023    BILITOT 0.7 12/28/2023       LIPID PANEL -   Lab Results   Component Value Date    CHOL 145 12/28/2023    TRIG 180 (H) 12/28/2023    HDL 51.5 12/28/2023    CHHDL 2.8 12/28/2023    LDLF 100 (H) 04/21/2022    VLDL 36 12/28/2023    NHDL 94 12/28/2023       RENAL FUNCTION PANEL -   Lab Results   Component Value Date    GLUCOSE 200 (H) 12/28/2023     12/28/2023    K 3.9 12/28/2023     12/28/2023    CO2 26 12/28/2023    ANIONGAP 14 12/28/2023    BUN 12 12/28/2023    CREATININE 0.58 12/28/2023    GFRMALE >90 01/09/2023    CALCIUM 9.5 12/28/2023    ALBUMIN 4.6 12/28/2023        Lab Results   Component Value Date    HGBA1C 5.4 12/28/2023       Last Cardiology Tests:  ECG independently reviewed from today: Sinus rhythm, 3 PVCs    CAC score 6/2022:  1. Coronary artery calcium score of  0*.  2. Mild aneurysmal dilatation of the thoracic aorta. Recommend  follow-up with contrasted chest CT in 2 years for surveillance..    Assessment/Plan   Very pleasant 58-year-old gentleman with hypertension who has been having chest sensations, seemingly more palpitations related to suspected symptomatic PVCs as seen on resting ECG today.  Plan for exercise stress test to evaluate arrhythmia burden with exercise and check 2D echocardiogram to assess cardiac structure and function with his PVCs.  Will likely alter his amlodipine to diltiazem or Toprol if still having symptoms following testing.    Thank you very much for allowing us to participate in Clifford's care, please contact me anytime with questions or concerns        Kendell Kemp MD

## 2024-04-05 ENCOUNTER — TELEPHONE (OUTPATIENT)
Dept: CARDIOLOGY | Facility: HOSPITAL | Age: 59
End: 2024-04-05
Payer: COMMERCIAL

## 2024-04-05 NOTE — TELEPHONE ENCOUNTER
This does not sound like a typical reaction to diltiazem.  He can hold it for now, but I would suggest that he be evaluated for an acute abdominal process by his PCP or even in the ER given bloody stools.

## 2024-04-11 ENCOUNTER — OFFICE VISIT (OUTPATIENT)
Dept: ENDOCRINOLOGY | Facility: CLINIC | Age: 59
End: 2024-04-11
Payer: COMMERCIAL

## 2024-04-11 VITALS
HEART RATE: 60 BPM | SYSTOLIC BLOOD PRESSURE: 172 MMHG | BODY MASS INDEX: 34.32 KG/M2 | TEMPERATURE: 98.4 F | DIASTOLIC BLOOD PRESSURE: 76 MMHG | WEIGHT: 239.2 LBS

## 2024-04-11 DIAGNOSIS — R68.82 LOW LIBIDO: ICD-10-CM

## 2024-04-11 DIAGNOSIS — R97.20 ABNORMAL PSA: ICD-10-CM

## 2024-04-11 DIAGNOSIS — E16.2 HYPOGLYCEMIA: Primary | ICD-10-CM

## 2024-04-11 DIAGNOSIS — R53.83 OTHER FATIGUE: ICD-10-CM

## 2024-04-11 PROCEDURE — 3078F DIAST BP <80 MM HG: CPT | Performed by: STUDENT IN AN ORGANIZED HEALTH CARE EDUCATION/TRAINING PROGRAM

## 2024-04-11 PROCEDURE — 99204 OFFICE O/P NEW MOD 45 MIN: CPT | Performed by: STUDENT IN AN ORGANIZED HEALTH CARE EDUCATION/TRAINING PROGRAM

## 2024-04-11 PROCEDURE — 3077F SYST BP >= 140 MM HG: CPT | Performed by: STUDENT IN AN ORGANIZED HEALTH CARE EDUCATION/TRAINING PROGRAM

## 2024-04-11 PROCEDURE — 1036F TOBACCO NON-USER: CPT | Performed by: STUDENT IN AN ORGANIZED HEALTH CARE EDUCATION/TRAINING PROGRAM

## 2024-04-11 RX ORDER — BLOOD SUGAR DIAGNOSTIC
STRIP MISCELLANEOUS
Qty: 100 STRIP | Refills: 2 | Status: SHIPPED | OUTPATIENT
Start: 2024-04-11

## 2024-04-11 RX ORDER — LANCETS
EACH MISCELLANEOUS
Qty: 100 EACH | Refills: 2 | Status: SHIPPED | OUTPATIENT
Start: 2024-04-11

## 2024-04-11 RX ORDER — INSULIN PUMP SYRINGE, 3 ML
EACH MISCELLANEOUS
Qty: 1 EACH | Refills: 0 | Status: SHIPPED | OUTPATIENT
Start: 2024-04-11

## 2024-04-11 NOTE — PATIENT INSTRUCTIONS
-check your sugars as needed for low sugar symptoms  -get your labs done fasting, 8-9am     For Low blood sugar: BG <70mg/dL  have 15 grams of carbohydrate to raise your blood glucose and check it after 15 minutes. If it's still below 70 mg/dL, have another serving.    Repeat these steps until your blood glucose is at least 70 mg/dL. Once your blood glucose is back to normal, eat a meal or snack to make sure it doesn't lower again.    This may be:  4 ounces (½ cup) of juice or regular soda.  1 tablespoon of sugar, honey, or syrup.  Hard candies, jellybeans, or gumdrops (see food label for how much to eat).  3-4 glucose tablets (follow instructions on packet).  1 dose of glucose gel (usually 1 tube; follow instructions on packet)    Follow up based on initial work up    Apolonia Larry MD  Divison of Endocrinology   [facility]   Phone: [phone]    option 4, then option 1  Fax: [phone]

## 2024-04-11 NOTE — PROGRESS NOTES
58 M PMH: PVC, IBS, cholecystectomy, depression, testicular torsion s/p surgery, history of seizures during childhood, pancreatitis, fatty liver     Coming in for hormone evaluation as part of work up for his anemia and fatigue     Has history of normocytic anemia and leukopenia seen Heme Onc, their though is the fatigue is disproportionate to the degree of anemia and was advised to look for other contributing factor     His main symptoms include fatigue, inability to tolerate physical activity     Has one child did not have any known issues with fertility     Low libido  No morning erections       Testicular surgery for torsion   Head trauma-playing baseball   Night sweats  Fatigue   Non restorative sleep   Weight gain     Reportedly has history of hypoglycemia but not confirmed       Past Medical History:   Diagnosis Date    Fracture of fourth toe, left, closed, initial encounter 10/18/2019    Fracture of fourth toe, left, open, with routine healing, subsequent encounter 10/14/2019    Other instability, left knee 05/02/2018    Other instability, left knee    Pain in left knee 05/02/2018    Left knee pain, unspecified chronicity    Pain in thoracic spine 02/21/2022    Thoracic spine pain    Unspecified injury of thorax, initial encounter 02/21/2022    Rib injury    Wedge compression fracture of unspecified thoracic vertebra, initial encounter for closed fracture ([facility]) 02/23/2022    Thoracic compression fracture     Family History   Problem Relation Name Age of Onset    Other (CABG) Mother      Other (cardiac pacemaker) Father      Other (malignant neoplasm of prostate) Father      Other (sepsis) Father      Other (stroke syndrome) Father      Other (PTCA) Sister       Social History     Socioeconomic History    Marital status:      Spouse name: Not on file    Number of children: Not on file    Years of education: Not on file    Highest education level: Not on file   Occupational History    Not on file    Tobacco Use    Smoking status: Never    Smokeless tobacco: Never   Vaping Use    Vaping status: Never Used   Substance and Sexual Activity    Alcohol use: Yes     Comment: socially    Drug use: Never    Sexual activity: Not on file   Other Topics Concern    Not on file   Social History Narrative    Not on file     Social Determinants of Health     Financial Resource Strain: Not on file   Food Insecurity: Not on file   Transportation Needs: Not on file   Physical Activity: Not on file   Stress: Not on file   Social Connections: Not on file   Intimate Partner Violence: Not on file   Housing Stability: Not on file   Social- currently does not work       ROS reviewed and is negative except for pertinent findings noted on HPI    Physical Exam  Constitutional:       Appearance: Normal appearance.   Cardiovascular:      Rate and Rhythm: Normal rate. Rhythm irregular.   Skin:     General: Skin is warm.      Comments: Pale, acne on the face, normal hair distribution   Neurological:      General: No focal deficit present.      Mental Status: He is alert and oriented to person, place, and time.   Psychiatric:         Mood and Affect: Mood normal.         Behavior: Behavior normal.           labs and imaging reviewed, pertinent findings listed on HPI and Impression    Problem List Items Addressed This Visit       Abnormal PSA     Other Visit Diagnoses       Hypoglycemia    -  Primary    Relevant Medications    OneTouch Ultra Test strip    Blood glucose monitoring meter kit (OneTouch Ultra2 Meter) kit    lancets (OneTouch UltraSoft Lancets) misc    Other Relevant Orders    Thyroid Stimulating Hormone    Thyroxine, Free    Hemoglobin A1C    Glucose, Fasting    Adrenocorticotropic Hormone (ACTH)    Cortisol    DHEA-Sulfate    Insulin-Like Growth Factor 1    Growth Hormone    Low libido        Relevant Orders    Thyroid Stimulating Hormone    Thyroxine, Free    FSH & LH    Testosterone,Free and Total    Sex Hormone Binding  Globulin    Prolactin    Other fatigue        Relevant Orders    Thyroid Stimulating Hormone    Thyroxine, Free            Due to his constellation of symptoms will rule out hormone deficiencies including hypogonadism and adrenal insufficiency   Even if the testosterone levels are low, if there are no identifiable organic cause this could likely be due his chronic illness as well so will be hard to determine cause and effect    In addition he has reported symptoms hypoglycemia but this is not confirmed so asked him to do fingersticks during symptoms with a food diary     Regarding his low PSA he had a CT abdomen that showed prostatic calcification in 2021, not sure the clinical significance of this, this is something a Urologist would be working up and managing     Follow up as needed

## 2024-04-12 ENCOUNTER — LAB (OUTPATIENT)
Dept: LAB | Facility: LAB | Age: 59
End: 2024-04-12
Payer: COMMERCIAL

## 2024-04-12 DIAGNOSIS — E16.2 HYPOGLYCEMIA: ICD-10-CM

## 2024-04-12 DIAGNOSIS — R53.83 OTHER FATIGUE: ICD-10-CM

## 2024-04-12 DIAGNOSIS — R68.82 LOW LIBIDO: ICD-10-CM

## 2024-04-12 DIAGNOSIS — R73.9 HYPERGLYCEMIA: ICD-10-CM

## 2024-04-12 LAB
CORTIS SERPL-MCNC: 17.8 UG/DL (ref 2.5–20)
DHEA-S SERPL-MCNC: 103 UG/DL (ref 70–310)
EST. AVERAGE GLUCOSE BLD GHB EST-MCNC: 91 MG/DL
FSH SERPL-ACNC: 29.8 IU/L
GLUCOSE P FAST SERPL-MCNC: 121 MG/DL (ref 74–99)
HBA1C MFR BLD: 4.8 %
LH SERPL-ACNC: 8.2 IU/L
PROLACTIN SERPL-MCNC: 6.3 UG/L (ref 2–18)
T4 FREE SERPL-MCNC: 0.88 NG/DL (ref 0.61–1.12)
TSH SERPL-ACNC: 1.72 MIU/L (ref 0.44–3.98)

## 2024-04-12 PROCEDURE — 84443 ASSAY THYROID STIM HORMONE: CPT

## 2024-04-12 PROCEDURE — 82947 ASSAY GLUCOSE BLOOD QUANT: CPT

## 2024-04-12 PROCEDURE — 82024 ASSAY OF ACTH: CPT

## 2024-04-12 PROCEDURE — 84146 ASSAY OF PROLACTIN: CPT

## 2024-04-12 PROCEDURE — 83002 ASSAY OF GONADOTROPIN (LH): CPT

## 2024-04-12 PROCEDURE — 84270 ASSAY OF SEX HORMONE GLOBUL: CPT

## 2024-04-12 PROCEDURE — 83003 ASSAY GROWTH HORMONE (HGH): CPT

## 2024-04-12 PROCEDURE — 84402 ASSAY OF FREE TESTOSTERONE: CPT

## 2024-04-12 PROCEDURE — 82627 DEHYDROEPIANDROSTERONE: CPT

## 2024-04-12 PROCEDURE — 84439 ASSAY OF FREE THYROXINE: CPT

## 2024-04-12 PROCEDURE — 83001 ASSAY OF GONADOTROPIN (FSH): CPT

## 2024-04-12 PROCEDURE — 36415 COLL VENOUS BLD VENIPUNCTURE: CPT

## 2024-04-12 PROCEDURE — 83036 HEMOGLOBIN GLYCOSYLATED A1C: CPT

## 2024-04-12 PROCEDURE — 84305 ASSAY OF SOMATOMEDIN: CPT

## 2024-04-12 PROCEDURE — 82533 TOTAL CORTISOL: CPT

## 2024-04-14 LAB
ACTH PLAS-MCNC: 29.8 PG/ML (ref 7.2–63.3)
GH SERPL-MCNC: 0.54 NG/ML (ref 0.05–3)
SHBG SERPL-SCNC: 42 NMOL/L (ref 19–76)

## 2024-04-15 LAB
IGF-I SERPL-MCNC: 104 NG/ML (ref 56–203)
IGF-I Z-SCORE SERPL: -0.4

## 2024-04-18 LAB
TESTOSTERONE FREE (CHAN): 4 PG/ML (ref 35–155)
TESTOSTERONE,TOTAL,LC-MS/MS: 31 NG/DL (ref 250–1100)

## 2024-04-23 DIAGNOSIS — E29.1 HYPOGONADISM IN MALE: Primary | ICD-10-CM

## 2024-04-23 NOTE — PROGRESS NOTES
Total T very low with high FSH consistent with primary hypogonadism     Will check for testicular ultrasound   And send a referral for sleep medicine as he reportedly has a history of sleep apnea, this needs to be addressed prior to starting HRT    Currently also suffering from some GI bleed pending GI appt so this is the priority before the rest of the other work up

## 2024-04-25 ENCOUNTER — APPOINTMENT (OUTPATIENT)
Dept: CARDIOLOGY | Facility: HOSPITAL | Age: 59
End: 2024-04-25
Payer: COMMERCIAL

## 2024-04-28 ENCOUNTER — APPOINTMENT (OUTPATIENT)
Dept: CARDIOLOGY | Facility: HOSPITAL | Age: 59
End: 2024-04-28
Payer: COMMERCIAL

## 2024-04-28 ENCOUNTER — APPOINTMENT (OUTPATIENT)
Dept: RADIOLOGY | Facility: HOSPITAL | Age: 59
End: 2024-04-28
Payer: COMMERCIAL

## 2024-04-28 ENCOUNTER — HOSPITAL ENCOUNTER (OUTPATIENT)
Facility: HOSPITAL | Age: 59
Setting detail: OBSERVATION
LOS: 1 days | Discharge: HOME HEALTH | End: 2024-04-29
Attending: EMERGENCY MEDICINE | Admitting: FAMILY MEDICINE
Payer: COMMERCIAL

## 2024-04-28 DIAGNOSIS — R55 ARRHYTHMIA, VAGAL: ICD-10-CM

## 2024-04-28 DIAGNOSIS — I63.9 ISCHEMIC STROKE (MULTI): Primary | ICD-10-CM

## 2024-04-28 DIAGNOSIS — D64.9 ANEMIA, UNSPECIFIED TYPE: ICD-10-CM

## 2024-04-28 DIAGNOSIS — R29.90 STROKE-LIKE SYMPTOM: ICD-10-CM

## 2024-04-28 LAB
ALBUMIN SERPL BCP-MCNC: 4.9 G/DL (ref 3.4–5)
ALP SERPL-CCNC: 43 U/L (ref 33–120)
ALT SERPL W P-5'-P-CCNC: 15 U/L (ref 10–52)
ANION GAP BLDV CALCULATED.4IONS-SCNC: 7 MMOL/L (ref 10–25)
ANION GAP SERPL CALC-SCNC: 18 MMOL/L (ref 10–20)
APTT PPP: 30 SECONDS (ref 27–38)
AST SERPL W P-5'-P-CCNC: 13 U/L (ref 9–39)
BASE EXCESS BLDV CALC-SCNC: 0.4 MMOL/L (ref -2–3)
BASOPHILS # BLD AUTO: 0.04 X10*3/UL (ref 0–0.1)
BASOPHILS NFR BLD AUTO: 0.9 %
BILIRUB SERPL-MCNC: 0.8 MG/DL (ref 0–1.2)
BNP SERPL-MCNC: 109 PG/ML (ref 0–99)
BODY TEMPERATURE: ABNORMAL
BUN SERPL-MCNC: 12 MG/DL (ref 6–23)
CA-I BLDV-SCNC: 1.24 MMOL/L (ref 1.1–1.33)
CALCIUM SERPL-MCNC: 9.8 MG/DL (ref 8.6–10.3)
CARDIAC TROPONIN I PNL SERPL HS: 4 NG/L (ref 0–20)
CHLORIDE BLDV-SCNC: 110 MMOL/L (ref 98–107)
CHLORIDE SERPL-SCNC: 104 MMOL/L (ref 98–107)
CHOLEST SERPL-MCNC: 128 MG/DL (ref 0–199)
CHOLESTEROL/HDL RATIO: 2.9
CO2 SERPL-SCNC: 26 MMOL/L (ref 21–32)
CREAT SERPL-MCNC: 0.74 MG/DL (ref 0.5–1.3)
EGFRCR SERPLBLD CKD-EPI 2021: >90 ML/MIN/1.73M*2
EOSINOPHIL # BLD AUTO: 0.07 X10*3/UL (ref 0–0.7)
EOSINOPHIL NFR BLD AUTO: 1.5 %
ERYTHROCYTE [DISTWIDTH] IN BLOOD BY AUTOMATED COUNT: 17.1 % (ref 11.5–14.5)
EST. AVERAGE GLUCOSE BLD GHB EST-MCNC: 91 MG/DL
GLUCOSE BLD MANUAL STRIP-MCNC: 134 MG/DL (ref 74–99)
GLUCOSE BLDV-MCNC: 117 MG/DL (ref 74–99)
GLUCOSE SERPL-MCNC: 123 MG/DL (ref 74–99)
HBA1C MFR BLD: 4.8 %
HCO3 BLDV-SCNC: 25.4 MMOL/L (ref 22–26)
HCT VFR BLD AUTO: 27.8 % (ref 41–52)
HCT VFR BLD EST: 25 % (ref 41–52)
HDLC SERPL-MCNC: 43.4 MG/DL
HGB BLD-MCNC: 8.9 G/DL (ref 13.5–17.5)
HGB BLDV-MCNC: 8.2 G/DL (ref 13.5–17.5)
IMM GRANULOCYTES # BLD AUTO: 0.01 X10*3/UL (ref 0–0.7)
IMM GRANULOCYTES NFR BLD AUTO: 0.2 % (ref 0–0.9)
INHALED O2 CONCENTRATION: 21 %
INR PPP: 1.2 (ref 0.9–1.1)
LACTATE BLDV-SCNC: 1.6 MMOL/L (ref 0.4–2)
LDLC SERPL CALC-MCNC: 70 MG/DL
LYMPHOCYTES # BLD AUTO: 0.86 X10*3/UL (ref 1.2–4.8)
LYMPHOCYTES NFR BLD AUTO: 18.5 %
MCH RBC QN AUTO: 33 PG (ref 26–34)
MCHC RBC AUTO-ENTMCNC: 32 G/DL (ref 32–36)
MCV RBC AUTO: 103 FL (ref 80–100)
MONOCYTES # BLD AUTO: 0.3 X10*3/UL (ref 0.1–1)
MONOCYTES NFR BLD AUTO: 6.4 %
NEUTROPHILS # BLD AUTO: 3.38 X10*3/UL (ref 1.2–7.7)
NEUTROPHILS NFR BLD AUTO: 72.5 %
NON HDL CHOLESTEROL: 85 MG/DL (ref 0–149)
NRBC BLD-RTO: 0 /100 WBCS (ref 0–0)
OXYHGB MFR BLDV: 76.4 % (ref 45–75)
PCO2 BLDV: 42 MM HG (ref 41–51)
PH BLDV: 7.39 PH (ref 7.33–7.43)
PLATELET # BLD AUTO: 469 X10*3/UL (ref 150–450)
PO2 BLDV: 48 MM HG (ref 35–45)
POTASSIUM BLDV-SCNC: 3.7 MMOL/L (ref 3.5–5.3)
POTASSIUM SERPL-SCNC: 3.6 MMOL/L (ref 3.5–5.3)
PROT SERPL-MCNC: 7.3 G/DL (ref 6.4–8.2)
PROTHROMBIN TIME: 13.5 SECONDS (ref 9.8–12.8)
RBC # BLD AUTO: 2.7 X10*6/UL (ref 4.5–5.9)
SAO2 % BLDV: 79 % (ref 45–75)
SODIUM BLDV-SCNC: 139 MMOL/L (ref 136–145)
SODIUM SERPL-SCNC: 144 MMOL/L (ref 136–145)
TRIGL SERPL-MCNC: 71 MG/DL (ref 0–149)
VLDL: 14 MG/DL (ref 0–40)
WBC # BLD AUTO: 4.7 X10*3/UL (ref 4.4–11.3)

## 2024-04-28 PROCEDURE — 85610 PROTHROMBIN TIME: CPT | Performed by: STUDENT IN AN ORGANIZED HEALTH CARE EDUCATION/TRAINING PROGRAM

## 2024-04-28 PROCEDURE — 80053 COMPREHEN METABOLIC PANEL: CPT | Performed by: STUDENT IN AN ORGANIZED HEALTH CARE EDUCATION/TRAINING PROGRAM

## 2024-04-28 PROCEDURE — 2550000001 HC RX 255 CONTRASTS: Performed by: STUDENT IN AN ORGANIZED HEALTH CARE EDUCATION/TRAINING PROGRAM

## 2024-04-28 PROCEDURE — 70450 CT HEAD/BRAIN W/O DYE: CPT

## 2024-04-28 PROCEDURE — 83036 HEMOGLOBIN GLYCOSYLATED A1C: CPT | Mod: GEALAB | Performed by: FAMILY MEDICINE

## 2024-04-28 PROCEDURE — 70498 CT ANGIOGRAPHY NECK: CPT

## 2024-04-28 PROCEDURE — 82947 ASSAY GLUCOSE BLOOD QUANT: CPT

## 2024-04-28 PROCEDURE — 83880 ASSAY OF NATRIURETIC PEPTIDE: CPT | Performed by: FAMILY MEDICINE

## 2024-04-28 PROCEDURE — 93005 ELECTROCARDIOGRAM TRACING: CPT

## 2024-04-28 PROCEDURE — 70498 CT ANGIOGRAPHY NECK: CPT | Performed by: RADIOLOGY

## 2024-04-28 PROCEDURE — 1200000002 HC GENERAL ROOM WITH TELEMETRY DAILY

## 2024-04-28 PROCEDURE — 85025 COMPLETE CBC W/AUTO DIFF WBC: CPT | Performed by: STUDENT IN AN ORGANIZED HEALTH CARE EDUCATION/TRAINING PROGRAM

## 2024-04-28 PROCEDURE — 99223 1ST HOSP IP/OBS HIGH 75: CPT | Performed by: FAMILY MEDICINE

## 2024-04-28 PROCEDURE — 36415 COLL VENOUS BLD VENIPUNCTURE: CPT | Performed by: STUDENT IN AN ORGANIZED HEALTH CARE EDUCATION/TRAINING PROGRAM

## 2024-04-28 PROCEDURE — 83718 ASSAY OF LIPOPROTEIN: CPT | Performed by: FAMILY MEDICINE

## 2024-04-28 PROCEDURE — 70450 CT HEAD/BRAIN W/O DYE: CPT | Performed by: RADIOLOGY

## 2024-04-28 PROCEDURE — 84132 ASSAY OF SERUM POTASSIUM: CPT | Mod: 59 | Performed by: STUDENT IN AN ORGANIZED HEALTH CARE EDUCATION/TRAINING PROGRAM

## 2024-04-28 PROCEDURE — 70496 CT ANGIOGRAPHY HEAD: CPT | Performed by: RADIOLOGY

## 2024-04-28 PROCEDURE — 80061 LIPID PANEL: CPT | Performed by: FAMILY MEDICINE

## 2024-04-28 PROCEDURE — 85730 THROMBOPLASTIN TIME PARTIAL: CPT | Performed by: STUDENT IN AN ORGANIZED HEALTH CARE EDUCATION/TRAINING PROGRAM

## 2024-04-28 PROCEDURE — 70496 CT ANGIOGRAPHY HEAD: CPT

## 2024-04-28 PROCEDURE — 99291 CRITICAL CARE FIRST HOUR: CPT | Performed by: EMERGENCY MEDICINE

## 2024-04-28 PROCEDURE — 84484 ASSAY OF TROPONIN QUANT: CPT | Performed by: STUDENT IN AN ORGANIZED HEALTH CARE EDUCATION/TRAINING PROGRAM

## 2024-04-28 RX ORDER — POLYETHYLENE GLYCOL 3350 17 G/17G
17 POWDER, FOR SOLUTION ORAL DAILY
Status: DISCONTINUED | OUTPATIENT
Start: 2024-04-28 | End: 2024-04-29 | Stop reason: HOSPADM

## 2024-04-28 RX ORDER — CLONAZEPAM 1 MG/1
1 TABLET ORAL 2 TIMES DAILY PRN
Status: DISCONTINUED | OUTPATIENT
Start: 2024-04-28 | End: 2024-04-29 | Stop reason: HOSPADM

## 2024-04-28 RX ORDER — ATORVASTATIN CALCIUM 80 MG/1
80 TABLET, FILM COATED ORAL NIGHTLY
Status: DISCONTINUED | OUTPATIENT
Start: 2024-04-28 | End: 2024-04-29 | Stop reason: HOSPADM

## 2024-04-28 RX ORDER — ACETAMINOPHEN 325 MG/1
650 TABLET ORAL ONCE
Status: COMPLETED | OUTPATIENT
Start: 2024-04-28 | End: 2024-04-28

## 2024-04-28 RX ORDER — LABETALOL HYDROCHLORIDE 5 MG/ML
10 INJECTION, SOLUTION INTRAVENOUS EVERY 10 MIN PRN
Status: DISCONTINUED | OUTPATIENT
Start: 2024-04-28 | End: 2024-04-29 | Stop reason: HOSPADM

## 2024-04-28 RX ORDER — HYDRALAZINE HYDROCHLORIDE 25 MG/1
25 TABLET, FILM COATED ORAL EVERY 6 HOURS PRN
Status: DISCONTINUED | OUTPATIENT
Start: 2024-04-30 | End: 2024-04-29 | Stop reason: HOSPADM

## 2024-04-28 RX ORDER — ASPIRIN 81 MG/1
81 TABLET ORAL DAILY
Status: DISCONTINUED | OUTPATIENT
Start: 2024-04-29 | End: 2024-04-29 | Stop reason: HOSPADM

## 2024-04-28 RX ORDER — HYDRALAZINE HYDROCHLORIDE 20 MG/ML
10 INJECTION INTRAMUSCULAR; INTRAVENOUS EVERY 4 HOURS PRN
Status: DISCONTINUED | OUTPATIENT
Start: 2024-04-28 | End: 2024-04-29 | Stop reason: HOSPADM

## 2024-04-28 RX ADMIN — ACETAMINOPHEN 650 MG: 325 TABLET ORAL at 21:20

## 2024-04-28 RX ADMIN — IOHEXOL 90 ML: 350 INJECTION, SOLUTION INTRAVENOUS at 13:39

## 2024-04-28 SDOH — SOCIAL STABILITY: SOCIAL INSECURITY: DO YOU FEEL UNSAFE GOING BACK TO THE PLACE WHERE YOU ARE LIVING?: NO

## 2024-04-28 SDOH — SOCIAL STABILITY: SOCIAL INSECURITY: HAS ANYONE EVER THREATENED TO HURT YOUR FAMILY OR YOUR PETS?: NO

## 2024-04-28 SDOH — SOCIAL STABILITY: SOCIAL INSECURITY: ARE THERE ANY APPARENT SIGNS OF INJURIES/BEHAVIORS THAT COULD BE RELATED TO ABUSE/NEGLECT?: NO

## 2024-04-28 SDOH — SOCIAL STABILITY: SOCIAL INSECURITY: WERE YOU ABLE TO COMPLETE ALL THE BEHAVIORAL HEALTH SCREENINGS?: YES

## 2024-04-28 SDOH — SOCIAL STABILITY: SOCIAL INSECURITY: ABUSE: ADULT

## 2024-04-28 SDOH — SOCIAL STABILITY: SOCIAL INSECURITY: DO YOU FEEL ANYONE HAS EXPLOITED OR TAKEN ADVANTAGE OF YOU FINANCIALLY OR OF YOUR PERSONAL PROPERTY?: NO

## 2024-04-28 SDOH — SOCIAL STABILITY: SOCIAL INSECURITY: HAVE YOU HAD ANY THOUGHTS OF HARMING ANYONE ELSE?: NO

## 2024-04-28 SDOH — SOCIAL STABILITY: SOCIAL INSECURITY: HAVE YOU HAD THOUGHTS OF HARMING ANYONE ELSE?: NO

## 2024-04-28 SDOH — SOCIAL STABILITY: SOCIAL INSECURITY: ARE YOU OR HAVE YOU BEEN THREATENED OR ABUSED PHYSICALLY, EMOTIONALLY, OR SEXUALLY BY ANYONE?: NO

## 2024-04-28 SDOH — SOCIAL STABILITY: SOCIAL INSECURITY: DOES ANYONE TRY TO KEEP YOU FROM HAVING/CONTACTING OTHER FRIENDS OR DOING THINGS OUTSIDE YOUR HOME?: NO

## 2024-04-28 ASSESSMENT — ACTIVITIES OF DAILY LIVING (ADL)
TOILETING: INDEPENDENT
PATIENT'S MEMORY ADEQUATE TO SAFELY COMPLETE DAILY ACTIVITIES?: YES
HEARING - LEFT EAR: FUNCTIONAL
ADEQUATE_TO_COMPLETE_ADL: YES
DRESSING YOURSELF: INDEPENDENT
WALKS IN HOME: INDEPENDENT
LACK_OF_TRANSPORTATION: NO
FEEDING YOURSELF: INDEPENDENT
HEARING - RIGHT EAR: FUNCTIONAL
GROOMING: INDEPENDENT
BATHING: INDEPENDENT
JUDGMENT_ADEQUATE_SAFELY_COMPLETE_DAILY_ACTIVITIES: YES

## 2024-04-28 ASSESSMENT — PATIENT HEALTH QUESTIONNAIRE - PHQ9
1. LITTLE INTEREST OR PLEASURE IN DOING THINGS: NOT AT ALL
2. FEELING DOWN, DEPRESSED OR HOPELESS: NOT AT ALL
SUM OF ALL RESPONSES TO PHQ9 QUESTIONS 1 & 2: 0

## 2024-04-28 ASSESSMENT — LIFESTYLE VARIABLES
AUDIT-C TOTAL SCORE: 0
HOW MANY STANDARD DRINKS CONTAINING ALCOHOL DO YOU HAVE ON A TYPICAL DAY: PATIENT DOES NOT DRINK
EVER FELT BAD OR GUILTY ABOUT YOUR DRINKING: NO
SKIP TO QUESTIONS 9-10: 1
EVER HAD A DRINK FIRST THING IN THE MORNING TO STEADY YOUR NERVES TO GET RID OF A HANGOVER: NO
HOW OFTEN DO YOU HAVE 6 OR MORE DRINKS ON ONE OCCASION: NEVER
HAVE PEOPLE ANNOYED YOU BY CRITICIZING YOUR DRINKING: NO
HAVE YOU EVER FELT YOU SHOULD CUT DOWN ON YOUR DRINKING: NO
AUDIT-C TOTAL SCORE: 0
TOTAL SCORE: 0
HOW OFTEN DO YOU HAVE A DRINK CONTAINING ALCOHOL: NEVER

## 2024-04-28 ASSESSMENT — COGNITIVE AND FUNCTIONAL STATUS - GENERAL
PATIENT BASELINE BEDBOUND: NO
MOBILITY SCORE: 24
DAILY ACTIVITIY SCORE: 24

## 2024-04-28 ASSESSMENT — PAIN - FUNCTIONAL ASSESSMENT
PAIN_FUNCTIONAL_ASSESSMENT: 0-10
PAIN_FUNCTIONAL_ASSESSMENT: 0-10

## 2024-04-28 ASSESSMENT — ENCOUNTER SYMPTOMS
GASTROINTESTINAL NEGATIVE: 1
RESPIRATORY NEGATIVE: 1
MUSCULOSKELETAL NEGATIVE: 1
CONSTITUTIONAL NEGATIVE: 1
WEAKNESS: 1
CARDIOVASCULAR NEGATIVE: 1
EYES NEGATIVE: 1

## 2024-04-28 ASSESSMENT — COLUMBIA-SUICIDE SEVERITY RATING SCALE - C-SSRS
2. HAVE YOU ACTUALLY HAD ANY THOUGHTS OF KILLING YOURSELF?: NO
1. IN THE PAST MONTH, HAVE YOU WISHED YOU WERE DEAD OR WISHED YOU COULD GO TO SLEEP AND NOT WAKE UP?: NO
6. HAVE YOU EVER DONE ANYTHING, STARTED TO DO ANYTHING, OR PREPARED TO DO ANYTHING TO END YOUR LIFE?: NO

## 2024-04-28 ASSESSMENT — PAIN SCALES - GENERAL
PAINLEVEL_OUTOF10: 0 - NO PAIN
PAINLEVEL_OUTOF10: 3

## 2024-04-28 ASSESSMENT — PAIN DESCRIPTION - LOCATION: LOCATION: HEAD

## 2024-04-28 NOTE — H&P
History Of Present Illness  Clifford Recinos is a 58 y.o. male with a medical history of hypogonadism, hypertension, anxiety, and depression who presented to the emergency room after passing out on the toilet.  The wife at bedside reports that he had Chinese food the night prior to admission and on the day of admission around 1220 he had abdominal cramping and while on the toilet having diarrhea he passed out.  The patient's wife reported she was able to catch him and he did not hit his head but also reported the patient was out for about 10 minutes.  Upon coming to she aided him to the dining room table where he laid his head down.  She reported he was wobbly upon walking to the table.  In the emergency room the patient was found to have dysarthria and right-sided weakness.  The BAT team was contacted and advised for TNK however given history of anemia and GI bleed and the suspicion that this is something other than the stroke the family refused.  The family reports the symptoms of dysarthria, stuttering speech and disequilibrium occur nightly.  They report this is exacerbated further by an active daytime.     Past Medical History  Past Medical History:   Diagnosis Date    Fracture of fourth toe, left, closed, initial encounter 10/18/2019    Fracture of fourth toe, left, open, with routine healing, subsequent encounter 10/14/2019    Other instability, left knee 05/02/2018    Other instability, left knee    Pain in left knee 05/02/2018    Left knee pain, unspecified chronicity    Pain in thoracic spine 02/21/2022    Thoracic spine pain    Unspecified injury of thorax, initial encounter 02/21/2022    Rib injury    Wedge compression fracture of unspecified thoracic vertebra, initial encounter for closed fracture (Multi) 02/23/2022    Thoracic compression fracture       Surgical History  Past Surgical History:   Procedure Laterality Date    OTHER SURGICAL HISTORY  02/21/2022    Appendectomy    OTHER SURGICAL HISTORY   02/21/2022    Cholecystectomy        Social History  He reports that he has never smoked. He has never used smokeless tobacco. He reports current alcohol use. He reports that he does not use drugs.    Family History  Family History   Problem Relation Name Age of Onset    Other (CABG) Mother      Other (cardiac pacemaker) Father      Other (malignant neoplasm of prostate) Father      Other (sepsis) Father      Other (stroke syndrome) Father      Other (PTCA) Sister          Allergies  Ketorolac tromethamine, Sudafed [pseudoephedrine hcl], Cefdinir, Levofloxacin, Nsaids (non-steroidal anti-inflammatory drug), Oxycodone-acetaminophen, Propoxyphene n-acetaminophen, and Doxycycline    Review of Systems   Constitutional: Negative.    HENT: Negative.     Eyes: Negative.    Respiratory: Negative.     Cardiovascular: Negative.    Gastrointestinal: Negative.    Musculoskeletal: Negative.    Skin: Negative.    Neurological:  Positive for syncope and weakness.        Physical Exam  Constitutional:       Appearance: Normal appearance.   HENT:      Head: Normocephalic and atraumatic.      Nose: Nose normal.      Mouth/Throat:      Mouth: Mucous membranes are moist.      Pharynx: Oropharynx is clear.   Eyes:      Extraocular Movements: Extraocular movements intact.      Conjunctiva/sclera: Conjunctivae normal.      Pupils: Pupils are equal, round, and reactive to light.   Cardiovascular:      Rate and Rhythm: Regular rhythm. Bradycardia present.      Pulses: Normal pulses.      Heart sounds: Normal heart sounds.   Pulmonary:      Effort: Pulmonary effort is normal.      Breath sounds: Normal breath sounds.   Abdominal:      General: Abdomen is flat. Bowel sounds are normal.      Palpations: Abdomen is soft.   Musculoskeletal:         General: Normal range of motion.      Cervical back: Normal range of motion.   Skin:     Capillary Refill: Capillary refill takes less than 2 seconds.   Neurological:      Mental Status: He is alert  and oriented to person, place, and time.      Comments: Patient has dysarthric speech, drift in right upper and lower extremities, retained sensation throughout NIH 5          Last Recorded Vitals  Blood pressure 151/89, pulse 56, temperature 36.4 °C (97.5 °F), temperature source Tympanic, resp. rate 15, height 1.829 m (6'), weight 107 kg (235 lb 0.2 oz), SpO2 97%.         Assessment/Plan   Clifford Recinos is a 58 y.o. male with a medical history of hypogonadism, hypertension, anxiety, and depression who presented to the emergency room after passing out on the toilet and is found to have concerns for CVA    Strokelike symptoms, right-sided weakness and dysarthric speech  Last known well around 1220, BAT team recommended TNK however patient and family refused given history of GI bleed  Patient and family reports the symptoms are similar to his nightly episodes of ataxia and confusion  CT of the head shows no acute findings  CTA of the head and neck shows no significant stenosis  We will follow-up with MRI of head  Will follow-up with echocardiogram  Consult neurology  Continue neurochecks  Continue aspirin and statin  Follow-up with lipid panel    Anemia with history of GI bleed  Patient follows a hematologist as an outpatient  Repeat CBC in a.m.  Previous baseline appears to be around 11  Monitor vitals  Will consult gastroenterology    Hypertension  Holding home medication of Norvasc to allow for permissive hypertension  Give hydralazine as needed every 4 hours for systolic greater than 220    Anxiety/depression  Continue home medications when verified    Hypogonadism  Being followed as an outpatient    DVT prophylaxis  Lovenox and SCDs    CODE STATUS  Full code        Isaias Clinton DO

## 2024-04-28 NOTE — PROGRESS NOTES
The patient was seen by the midlevel/resident.  I have personally saw the patient and made/approved the management plan and take responsibility for the patient management.  I reviewed the EKG's (when done) and agree with the interpretation.  I have seen and examined the patient; agree with the workup, evaluation, MDM, and diagnosis.  The care plan has been discussed with the midlevel/resident; I have reviewed the note and agree with the documented findings.     Patient presents with weakness of his right arm and right leg with speech difficulty.  EMS reports that started approximate hour prior to arrival.  He had some difficulty giving his history as well.  EMS is unaware of his past medical history.  Patient is limited historian.  Worked up as a brain attack prior to his arrival.  Seen in the hallway and taken to CT.  He is Van positive.  Concern that he was just seen a week ago for GI bleed.  Will workup and consult the brain attack attending.    The brain attack attending recommended consider TNK.  Family arrived and said that he is a bleeder and constantly has problems with bleeding and that he has symptoms like this when he gets weak and tired and he is spent a lot of time weeding beforehand and was weak and tired.  They did not want TNK and felt that this was his behavior that happened before with exhaustion.  We discussed risk benefits alternatives we obtained consent and the wife states she is the power of .  The family together decided against TNK and patient be hospitalized.  Diagnoses as of 04/28/24 1631   Ischemic stroke (Multi)     Wilber Silva MD

## 2024-04-28 NOTE — ED PROCEDURE NOTE
Procedure  Critical Care    Performed by: Wilber Silva MD  Authorized by: Wilber Silva MD    Critical care provider statement:     Critical care time (minutes):  55    Critical care time was exclusive of:  Separately billable procedures and treating other patients and teaching time    Critical care was necessary to treat or prevent imminent or life-threatening deterioration of the following conditions: Acute stroke.    Critical care was time spent personally by me on the following activities:  Ordering and review of radiographic studies, ordering and review of laboratory studies, pulse oximetry, re-evaluation of patient's condition, review of old charts, obtaining history from patient or surrogate, discussions with consultants and development of treatment plan with patient or surrogate               Wilber Silva MD  04/28/24 [id number]

## 2024-04-28 NOTE — CARE PLAN
The patient's goals for the shift include  keep informed.  Problem: General Stroke  Goal: Participate in treatment (ie., meds, therapy) throughout shift  Outcome: Progressing       The clinical goals for the shift include keep pt free of injury    Pt was admitted and NIH and Swallow eval was passed

## 2024-04-28 NOTE — ED PROVIDER NOTES
HPI   No chief complaint on file.      HPI  Patient is a 58-year-old male with past medical history of hypertension, diabetes, asthma and anxiety and depression who presents emergency department as a BAT activation.  Last known well was 1 hour prior to presentation.  He is a limited historian and has obvious right-sided facial droop, expressive aphasia and significantly dysarthric speech.  Denies any pain.  Denies any trauma to the head.    PMH:as above.  Meds:reviewed in EMR.  PSH:reviewed in EMR.  allergies:reviewed in EMR.  social:Denies X3.  Family History: non-contributory to acute presentation.    A full 10 point Review of Systems was reviewed with the patient and is negative unless stated in the HPI.                  No data recorded       NIH Stroke Scale: 9             Patient History   Past Medical History:   Diagnosis Date    Fracture of fourth toe, left, closed, initial encounter 10/18/2019    Fracture of fourth toe, left, open, with routine healing, subsequent encounter 10/14/2019    Other instability, left knee 05/02/2018    Other instability, left knee    Pain in left knee 05/02/2018    Left knee pain, unspecified chronicity    Pain in thoracic spine 02/21/2022    Thoracic spine pain    Unspecified injury of thorax, initial encounter 02/21/2022    Rib injury    Wedge compression fracture of unspecified thoracic vertebra, initial encounter for closed fracture (Multi) 02/23/2022    Thoracic compression fracture     Past Surgical History:   Procedure Laterality Date    OTHER SURGICAL HISTORY  02/21/2022    Appendectomy    OTHER SURGICAL HISTORY  02/21/2022    Cholecystectomy     Family History   Problem Relation Name Age of Onset    Other (CABG) Mother      Other (cardiac pacemaker) Father      Other (malignant neoplasm of prostate) Father      Other (sepsis) Father      Other (stroke syndrome) Father      Other (PTCA) Sister       Social History     Tobacco Use    Smoking status: Never    Smokeless  tobacco: Never   Vaping Use    Vaping status: Never Used   Substance Use Topics    Alcohol use: Yes     Comment: socially    Drug use: Never       Physical Exam   ED Triage Vitals   Temp Pulse Resp BP   -- -- -- --      SpO2 Temp src Heart Rate Source Patient Position   -- -- -- --      BP Location FiO2 (%)     -- --       Physical Exam    Physical Exam:    Appearance: Alert, oriented , cooperative,  in no acute distress. Well nourished & well hydrated.    Skin: Intact,  dry skin, no lesions, rash, petechiae or purpura.     Eyes: PERRLA, EOMs intact,  No scleral injection. No scleral icterus.     ENT: Hearing grossly intact. External auditory canals patent. Nares patent, mucus membranes moist. Dentition without lesions.     Neck: Supple, without meningismus. Trachea at midline.     Pulmonary: Clear bilaterally with good chest wall excursion. No rales, rhonchi or wheezing. No accessory muscle use or stridor.    Cardiac: Normal S1, S2 without murmur, rub, gallop or extrasystole. No JVD, Carotids without bruits.    Abdomen: Soft, nontender.  No palpable organomegaly.  No rebound or guarding.      Genitourinary: Exam deferred.    Musculoskeletal:  no edema, or deformity. Pulses full and equal. No cyanosis or clubbing.    Neurological: Alert and oriented x 3.  There is right upper extremity and right lower extremity drift.  There is a forced right gaze preference.  No neglect.  Significantly dysarthric speech.  There is mild expressive aphasia.    Psychiatric: Appropriate mood and affect.     ED Course & MDM   Diagnoses as of 04/28/24 1613   Ischemic stroke (Multi)   EKG shows sinus bradycardia rate 57 with first-degree AV block.  Normal axis.  No QTc interval prolongation.  No ST segment or T wave changes concerning for ischemia.  Apart from resolution of frequent PVCs, largely unchanged EKG from prior done January/11/2024.    Medical Decision Making  Patient is a 58-year-old male with diabetes and hypertension who  presents emergency department with word finding difficulty and right-sided weakness that started an hour prior to presentation.  He was hypertensive on arrival in the 150s over 80s and mildly bradycardic in the 50s otherwise hemodynamically stable and afebrile.  He had significant dysarthria, expressive aphasia, right upper and lower extremity drift, mildly decreased consciousness and facial droop.  Scored an NIH of 8.  Patient rapidly taken to CT/CTA as he was van positive.    CTs were negative.  EKG was nonischemic (see above for formal dictation) and troponins were negative.  Basic labs were large without abnormality.  Discussed the case with neurostroke team on main [place] who advised the patient was appropriate for TNK.  On further chart review, patient was having melanotic stools and was being worked up for this as an outpatient.  Family arrived and said that most of his deficits were baseline.  Patient's family did not wish patient to have TNK.  They wanted a medical workup.  Relayed this message to the neuro stroke team and CTAs were negative so there is no indication for transfer for thrombectomy.  Patient was admitted to medicine for further management this point.  He was in stable condition at the time of admission.      Patient was discussed with Dr. Silva who agrees.      Uriel Chou MD  Emergency Medicine, PGY3    Procedure  Procedures     Juan Chou MD  Resident  04/28/24 [id number]

## 2024-04-29 ENCOUNTER — APPOINTMENT (OUTPATIENT)
Dept: RADIOLOGY | Facility: HOSPITAL | Age: 59
End: 2024-04-29
Payer: COMMERCIAL

## 2024-04-29 ENCOUNTER — APPOINTMENT (OUTPATIENT)
Dept: CARDIOLOGY | Facility: HOSPITAL | Age: 59
End: 2024-04-29
Payer: COMMERCIAL

## 2024-04-29 VITALS
SYSTOLIC BLOOD PRESSURE: 116 MMHG | DIASTOLIC BLOOD PRESSURE: 63 MMHG | HEIGHT: 72 IN | BODY MASS INDEX: 31.02 KG/M2 | WEIGHT: 229 LBS | RESPIRATION RATE: 16 BRPM | OXYGEN SATURATION: 95 % | TEMPERATURE: 98.1 F | HEART RATE: 72 BPM

## 2024-04-29 PROBLEM — R29.90 STROKE-LIKE SYMPTOM: Status: RESOLVED | Noted: 2024-04-29 | Resolved: 2024-04-29

## 2024-04-29 PROBLEM — R29.90 STROKE-LIKE SYMPTOM: Status: ACTIVE | Noted: 2024-04-29

## 2024-04-29 PROBLEM — I63.9 ISCHEMIC STROKE (MULTI): Status: RESOLVED | Noted: 2024-04-28 | Resolved: 2024-04-29

## 2024-04-29 LAB
ALBUMIN SERPL BCP-MCNC: 4.4 G/DL (ref 3.4–5)
ANION GAP SERPL CALC-SCNC: 13 MMOL/L (ref 10–20)
AORTIC VALVE MEAN GRADIENT: 7.6 MMHG
AORTIC VALVE PEAK VELOCITY: 1.94 M/S
AV PEAK GRADIENT: 15.1 MMHG
AVA (PEAK VEL): 2.04 CM2
AVA (VTI): 2.27 CM2
BUN SERPL-MCNC: 9 MG/DL (ref 6–23)
CALCIUM SERPL-MCNC: 9.3 MG/DL (ref 8.6–10.3)
CHLORIDE SERPL-SCNC: 107 MMOL/L (ref 98–107)
CK SERPL-CCNC: 78 U/L (ref 0–325)
CO2 SERPL-SCNC: 25 MMOL/L (ref 21–32)
CREAT SERPL-MCNC: 0.6 MG/DL (ref 0.5–1.3)
EGFRCR SERPLBLD CKD-EPI 2021: >90 ML/MIN/1.73M*2
EJECTION FRACTION APICAL 4 CHAMBER: 63.3
ERYTHROCYTE [DISTWIDTH] IN BLOOD BY AUTOMATED COUNT: 16.9 % (ref 11.5–14.5)
GLUCOSE SERPL-MCNC: 122 MG/DL (ref 74–99)
HCT VFR BLD AUTO: 28.1 % (ref 41–52)
HGB BLD-MCNC: 8.9 G/DL (ref 13.5–17.5)
LEFT VENTRICLE INTERNAL DIMENSION DIASTOLE: 6.03 CM (ref 3.5–6)
LEFT VENTRICULAR OUTFLOW TRACT DIAMETER: 2.05 CM
LV EJECTION FRACTION BIPLANE: 65 %
MCH RBC QN AUTO: 32.8 PG (ref 26–34)
MCHC RBC AUTO-ENTMCNC: 31.7 G/DL (ref 32–36)
MCV RBC AUTO: 104 FL (ref 80–100)
MITRAL VALVE E/A RATIO: 1.75
MITRAL VALVE E/E' RATIO: 5.82
NRBC BLD-RTO: 0 /100 WBCS (ref 0–0)
PHOSPHATE SERPL-MCNC: 3.9 MG/DL (ref 2.5–4.9)
PLATELET # BLD AUTO: 396 X10*3/UL (ref 150–450)
POTASSIUM SERPL-SCNC: 3.8 MMOL/L (ref 3.5–5.3)
RBC # BLD AUTO: 2.71 X10*6/UL (ref 4.5–5.9)
RIGHT VENTRICLE FREE WALL PEAK S': 22 CM/S
RIGHT VENTRICLE PEAK SYSTOLIC PRESSURE: 48.7 MMHG
SODIUM SERPL-SCNC: 141 MMOL/L (ref 136–145)
TRICUSPID ANNULAR PLANE SYSTOLIC EXCURSION: 3.1 CM
WBC # BLD AUTO: 3.4 X10*3/UL (ref 4.4–11.3)

## 2024-04-29 PROCEDURE — 99222 1ST HOSP IP/OBS MODERATE 55: CPT | Performed by: PSYCHIATRY & NEUROLOGY

## 2024-04-29 PROCEDURE — 99221 1ST HOSP IP/OBS SF/LOW 40: CPT | Performed by: INTERNAL MEDICINE

## 2024-04-29 PROCEDURE — 97165 OT EVAL LOW COMPLEX 30 MIN: CPT | Mod: GO

## 2024-04-29 PROCEDURE — 93306 TTE W/DOPPLER COMPLETE: CPT | Performed by: INTERNAL MEDICINE

## 2024-04-29 PROCEDURE — 94760 N-INVAS EAR/PLS OXIMETRY 1: CPT

## 2024-04-29 PROCEDURE — 83519 RIA NONANTIBODY: CPT

## 2024-04-29 PROCEDURE — 99238 HOSP IP/OBS DSCHRG MGMT 30/<: CPT | Performed by: FAMILY MEDICINE

## 2024-04-29 PROCEDURE — 70551 MRI BRAIN STEM W/O DYE: CPT | Performed by: RADIOLOGY

## 2024-04-29 PROCEDURE — 2500000001 HC RX 250 WO HCPCS SELF ADMINISTERED DRUGS (ALT 637 FOR MEDICARE OP): Performed by: FAMILY MEDICINE

## 2024-04-29 PROCEDURE — 72146 MRI CHEST SPINE W/O DYE: CPT | Performed by: STUDENT IN AN ORGANIZED HEALTH CARE EDUCATION/TRAINING PROGRAM

## 2024-04-29 PROCEDURE — 70551 MRI BRAIN STEM W/O DYE: CPT

## 2024-04-29 PROCEDURE — 82085 ASSAY OF ALDOLASE: CPT

## 2024-04-29 PROCEDURE — 82550 ASSAY OF CK (CPK): CPT

## 2024-04-29 PROCEDURE — 72148 MRI LUMBAR SPINE W/O DYE: CPT | Performed by: STUDENT IN AN ORGANIZED HEALTH CARE EDUCATION/TRAINING PROGRAM

## 2024-04-29 PROCEDURE — 93306 TTE W/DOPPLER COMPLETE: CPT

## 2024-04-29 PROCEDURE — 97161 PT EVAL LOW COMPLEX 20 MIN: CPT | Mod: GP

## 2024-04-29 PROCEDURE — 72148 MRI LUMBAR SPINE W/O DYE: CPT

## 2024-04-29 PROCEDURE — 97116 GAIT TRAINING THERAPY: CPT | Mod: GP

## 2024-04-29 PROCEDURE — 80069 RENAL FUNCTION PANEL: CPT | Performed by: FAMILY MEDICINE

## 2024-04-29 PROCEDURE — 36415 COLL VENOUS BLD VENIPUNCTURE: CPT | Performed by: FAMILY MEDICINE

## 2024-04-29 PROCEDURE — 85027 COMPLETE CBC AUTOMATED: CPT | Performed by: FAMILY MEDICINE

## 2024-04-29 PROCEDURE — 72146 MRI CHEST SPINE W/O DYE: CPT

## 2024-04-29 PROCEDURE — 36415 COLL VENOUS BLD VENIPUNCTURE: CPT

## 2024-04-29 PROCEDURE — 2500000005 HC RX 250 GENERAL PHARMACY W/O HCPCS: Performed by: FAMILY MEDICINE

## 2024-04-29 PROCEDURE — G0378 HOSPITAL OBSERVATION PER HR: HCPCS

## 2024-04-29 RX ORDER — ACETAMINOPHEN 325 MG/1
650 TABLET ORAL 3 TIMES DAILY PRN
Status: DISCONTINUED | OUTPATIENT
Start: 2024-04-29 | End: 2024-04-29 | Stop reason: HOSPADM

## 2024-04-29 RX ORDER — PANTOPRAZOLE SODIUM 40 MG/1
40 TABLET, DELAYED RELEASE ORAL 2 TIMES DAILY
Qty: 60 TABLET | Refills: 0 | Status: SHIPPED | OUTPATIENT
Start: 2024-04-29

## 2024-04-29 RX ORDER — ESCITALOPRAM OXALATE 10 MG/1
5 TABLET ORAL DAILY
Status: DISCONTINUED | OUTPATIENT
Start: 2024-04-29 | End: 2024-04-29 | Stop reason: HOSPADM

## 2024-04-29 RX ADMIN — Medication 21 PERCENT: at 09:00

## 2024-04-29 RX ADMIN — ASPIRIN 81 MG: 81 TABLET, COATED ORAL at 08:17

## 2024-04-29 RX ADMIN — ACETAMINOPHEN 650 MG: 325 TABLET ORAL at 08:42

## 2024-04-29 RX ADMIN — ESCITALOPRAM OXALATE 5 MG: 10 TABLET ORAL at 08:17

## 2024-04-29 ASSESSMENT — ENCOUNTER SYMPTOMS
HEMATOLOGIC/LYMPHATIC NEGATIVE: 1
CARDIOVASCULAR NEGATIVE: 1
BLOOD IN STOOL: 1
ARTHRALGIAS: 1
RESPIRATORY NEGATIVE: 1
CONSTITUTIONAL NEGATIVE: 1
NEUROLOGICAL NEGATIVE: 1
JOINT SWELLING: 1
PSYCHIATRIC NEGATIVE: 1
ABDOMINAL PAIN: 1

## 2024-04-29 ASSESSMENT — COGNITIVE AND FUNCTIONAL STATUS - GENERAL
TOILETING: A LOT
TOILETING: A LOT
HELP NEEDED FOR BATHING: A LOT
DRESSING REGULAR LOWER BODY CLOTHING: A LOT
MOVING TO AND FROM BED TO CHAIR: A LITTLE
WALKING IN HOSPITAL ROOM: A LOT
DAILY ACTIVITIY SCORE: 17
DAILY ACTIVITIY SCORE: 17
WALKING IN HOSPITAL ROOM: A LOT
CLIMB 3 TO 5 STEPS WITH RAILING: A LOT
MOBILITY SCORE: 18
CLIMB 3 TO 5 STEPS WITH RAILING: A LOT
DRESSING REGULAR LOWER BODY CLOTHING: A LOT
STANDING UP FROM CHAIR USING ARMS: A LITTLE
HELP NEEDED FOR BATHING: A LOT
DRESSING REGULAR UPPER BODY CLOTHING: A LITTLE
DRESSING REGULAR UPPER BODY CLOTHING: A LITTLE
MOBILITY SCORE: 18
STANDING UP FROM CHAIR USING ARMS: A LITTLE
MOVING TO AND FROM BED TO CHAIR: A LITTLE

## 2024-04-29 ASSESSMENT — PAIN SCALES - GENERAL
PAINLEVEL_OUTOF10: 3
PAINLEVEL_OUTOF10: 0 - NO PAIN
PAINLEVEL_OUTOF10: 3
PAINLEVEL_OUTOF10: 3

## 2024-04-29 ASSESSMENT — ACTIVITIES OF DAILY LIVING (ADL)
ADL_ASSISTANCE: INDEPENDENT
ADL_ASSISTANCE: INDEPENDENT

## 2024-04-29 ASSESSMENT — PAIN - FUNCTIONAL ASSESSMENT
PAIN_FUNCTIONAL_ASSESSMENT: 0-10

## 2024-04-29 ASSESSMENT — PAIN DESCRIPTION - LOCATION: LOCATION: HEAD

## 2024-04-29 NOTE — CARE PLAN
Problem: General Stroke  Goal: Demonstrate improvement in neurological exam throughout the shift  Outcome: Progressing  Goal: Maintain BP within ordered limits throughout shift  Outcome: Progressing  Goal: Participate in treatment (ie., meds, therapy) throughout shift  Outcome: Progressing  Goal: No symptoms of aspiration throughout shift  Outcome: Progressing  Goal: Controlled blood glucose throughout shift  Outcome: Progressing  Goal: Out of bed three times today  Outcome: Progressing   The patient's goals for the shift include find out what is causing the symptoms     The clinical goals for the shift include patient will show improvement in mental status this shift    Over the shift, the patient did not make progress toward the following goals. Barriers to progression include altered mental status/neuro check. Recommendations to address these barriers include neuro checks, sleep hygiene, assistance when out of bed.

## 2024-04-29 NOTE — CONSULTS
Consults  Neurology        Physical Exam  GE; sitting in a chair, in NAD  HEENT; AT/NC, no conjunctival palor, anicteric sclera, no nystagmus  Chest; good air entry b/l, no added sounds  CVS s1 and s2, no MGR  Ext no pedal edema, pulses intact  Abd; soft, NT/ND,no organomegaly.   Neuro; Aox3, motor normal in LLE, RLE weakness, sensation intake, Reflex normal except for absent ankle reflexes, with right sided foot drop. Gait, sight left sided tilt.   Last Recorded Vitals  /74   Pulse 56   Temp 36.7 °C (98.1 °F)   Resp 16   Wt 104 kg (229 lb)   SpO2 95%     Relevant Results  Pending MRI thoracic and lumbar area.   Results for orders placed or performed during the hospital encounter of 04/28/24 (from the past 24 hour(s))   POCT GLUCOSE   Result Value Ref Range    POCT Glucose 134 (H) 74 - 99 mg/dL   CBC and Auto Differential   Result Value Ref Range    WBC 4.7 4.4 - 11.3 x10*3/uL    nRBC 0.0 0.0 - 0.0 /100 WBCs    RBC 2.70 (L) 4.50 - 5.90 x10*6/uL    Hemoglobin 8.9 (L) 13.5 - 17.5 g/dL    Hematocrit 27.8 (L) 41.0 - 52.0 %     (H) 80 - 100 fL    MCH 33.0 26.0 - 34.0 pg    MCHC 32.0 32.0 - 36.0 g/dL    RDW 17.1 (H) 11.5 - 14.5 %    Platelets 469 (H) 150 - 450 x10*3/uL    Neutrophils % 72.5 40.0 - 80.0 %    Immature Granulocytes %, Automated 0.2 0.0 - 0.9 %    Lymphocytes % 18.5 13.0 - 44.0 %    Monocytes % 6.4 2.0 - 10.0 %    Eosinophils % 1.5 0.0 - 6.0 %    Basophils % 0.9 0.0 - 2.0 %    Neutrophils Absolute 3.38 1.20 - 7.70 x10*3/uL    Immature Granulocytes Absolute, Automated 0.01 0.00 - 0.70 x10*3/uL    Lymphocytes Absolute 0.86 (L) 1.20 - 4.80 x10*3/uL    Monocytes Absolute 0.30 0.10 - 1.00 x10*3/uL    Eosinophils Absolute 0.07 0.00 - 0.70 x10*3/uL    Basophils Absolute 0.04 0.00 - 0.10 x10*3/uL   Comprehensive metabolic panel   Result Value Ref Range    Glucose 123 (H) 74 - 99 mg/dL    Sodium 144 136 - 145 mmol/L    Potassium 3.6 3.5 - 5.3 mmol/L    Chloride 104 98 - 107 mmol/L    Bicarbonate 26  21 - 32 mmol/L    Anion Gap 18 10 - 20 mmol/L    Urea Nitrogen 12 6 - 23 mg/dL    Creatinine 0.74 0.50 - 1.30 mg/dL    eGFR >90 >60 mL/min/1.73m*2    Calcium 9.8 8.6 - 10.3 mg/dL    Albumin 4.9 3.4 - 5.0 g/dL    Alkaline Phosphatase 43 33 - 120 U/L    Total Protein 7.3 6.4 - 8.2 g/dL    AST 13 9 - 39 U/L    Bilirubin, Total 0.8 0.0 - 1.2 mg/dL    ALT 15 10 - 52 U/L   Troponin I, High Sensitivity   Result Value Ref Range    Troponin I, High Sensitivity 4 0 - 20 ng/L   Protime-INR   Result Value Ref Range    Protime 13.5 (H) 9.8 - 12.8 seconds    INR 1.2 (H) 0.9 - 1.1   APTT   Result Value Ref Range    aPTT 30 27 - 38 seconds   ECG 12 lead   Result Value Ref Range    Ventricular Rate 57 BPM    Atrial Rate 57 BPM    FL Interval 220 ms    QRS Duration 102 ms    QT Interval 442 ms    QTC Calculation(Bazett) 430 ms    P Axis 76 degrees    R Axis 29 degrees    T Axis 53 degrees    QRS Count 9 beats    Q Onset 220 ms    P Onset 110 ms    P Offset 157 ms    T Offset 441 ms    QTC Fredericia 434 ms   BLOOD GAS VENOUS FULL PANEL   Result Value Ref Range    POCT pH, Venous 7.39 7.33 - 7.43 pH    POCT pCO2, Venous 42 41 - 51 mm Hg    POCT pO2, Venous 48 (H) 35 - 45 mm Hg    POCT SO2, Venous 79 (H) 45 - 75 %    POCT Oxy Hemoglobin, Venous 76.4 (H) 45.0 - 75.0 %    POCT Hematocrit Calculated, Venous 25.0 (L) 41.0 - 52.0 %    POCT Sodium, Venous 139 136 - 145 mmol/L    POCT Potassium, Venous 3.7 3.5 - 5.3 mmol/L    POCT Chloride, Venous 110 (H) 98 - 107 mmol/L    POCT Ionized Calicum, Venous 1.24 1.10 - 1.33 mmol/L    POCT Glucose, Venous 117 (H) 74 - 99 mg/dL    POCT Lactate, Venous 1.6 0.4 - 2.0 mmol/L    POCT Base Excess, Venous 0.4 -2.0 - 3.0 mmol/L    POCT HCO3 Calculated, Venous 25.4 22.0 - 26.0 mmol/L    POCT Hemoglobin, Venous 8.2 (L) 13.5 - 17.5 g/dL    POCT Anion Gap, Venous 7.0 (L) 10.0 - 25.0 mmol/L    Patient Temperature      FiO2 21 %   Lipid Panel   Result Value Ref Range    Cholesterol 128 0 - 199 mg/dL     HDL-Cholesterol 43.4 mg/dL    Cholesterol/HDL Ratio 2.9     LDL Calculated 70 <=99 mg/dL    VLDL 14 0 - 40 mg/dL    Triglycerides 71 0 - 149 mg/dL    Non HDL Cholesterol 85 0 - 149 mg/dL   Hemoglobin A1C   Result Value Ref Range    Hemoglobin A1C 4.8 see below %    Estimated Average Glucose 91 Not Established mg/dL   B-Type Natriuretic Peptide   Result Value Ref Range     (H) 0 - 99 pg/mL   Renal Function Panel   Result Value Ref Range    Glucose 122 (H) 74 - 99 mg/dL    Sodium 141 136 - 145 mmol/L    Potassium 3.8 3.5 - 5.3 mmol/L    Chloride 107 98 - 107 mmol/L    Bicarbonate 25 21 - 32 mmol/L    Anion Gap 13 10 - 20 mmol/L    Urea Nitrogen 9 6 - 23 mg/dL    Creatinine 0.60 0.50 - 1.30 mg/dL    eGFR >90 >60 mL/min/1.73m*2    Calcium 9.3 8.6 - 10.3 mg/dL    Phosphorus 3.9 2.5 - 4.9 mg/dL    Albumin 4.4 3.4 - 5.0 g/dL   CBC   Result Value Ref Range    WBC 3.4 (L) 4.4 - 11.3 x10*3/uL    nRBC 0.0 0.0 - 0.0 /100 WBCs    RBC 2.71 (L) 4.50 - 5.90 x10*6/uL    Hemoglobin 8.9 (L) 13.5 - 17.5 g/dL    Hematocrit 28.1 (L) 41.0 - 52.0 %     (H) 80 - 100 fL    MCH 32.8 26.0 - 34.0 pg    MCHC 31.7 (L) 32.0 - 36.0 g/dL    RDW 16.9 (H) 11.5 - 14.5 %    Platelets 396 150 - 450 x10*3/uL   Transthoracic Echo (TTE) Complete   Result Value Ref Range    BSA 2.3 m2         Assessment/Plan     ======================  Consults    Reason For Consult  Right sided weakness and slurred speech.     History Of Present Illness  Clifford Recinos is a 58 y.o. male presenting with medical hx of HTN, Anxiety, hypogonadism, and depression. Who was admitted after passing out while using the rest room. Patient reports having chinese food night before symptoms of diarrhea started, subsequently had several bouts of diarrhea. He also reports right sided weakness (chronic) with dysarthria. Imaging studies in the ER showed CT with no acute findings, and CTA head/neck showed no stenosis. Labs showed HB 8.9, mcv 109, wbc 3.4, Na 141, k 3.8, lipid wnl,  A1c 4.8.     Neuro was consulted for stroke like symptoms(right sided weakness, dysarthria)    Of note patient has a hx of Grandmal seizures as a child which stopped around 20 yrs of age. He is not currently on any anti seizure medications and has not had any seizures.     Past Medical and Surgical History    Past Medical History:   Diagnosis Date    Fracture of fourth toe, left, closed, initial encounter 10/18/2019    Fracture of fourth toe, left, open, with routine healing, subsequent encounter 10/14/2019    Other instability, left knee 05/02/2018    Other instability, left knee    Pain in left knee 05/02/2018    Left knee pain, unspecified chronicity    Pain in thoracic spine 02/21/2022    Thoracic spine pain    Unspecified injury of thorax, initial encounter 02/21/2022    Rib injury    Wedge compression fracture of unspecified thoracic vertebra, initial encounter for closed fracture (Multi) 02/23/2022    Thoracic compression fracture          Past Surgical History:   Procedure Laterality Date    OTHER SURGICAL HISTORY  02/21/2022    Appendectomy    OTHER SURGICAL HISTORY  02/21/2022    Cholecystectomy        Social History  Social History     Tobacco Use    Smoking status: Never    Smokeless tobacco: Never   Vaping Use    Vaping status: Never Used   Substance Use Topics    Alcohol use: Yes     Comment: socially    Drug use: Never     Allergies  Ketorolac tromethamine, Sudafed [pseudoephedrine hcl], Cefdinir, Levofloxacin, Nsaids (non-steroidal anti-inflammatory drug), Oxycodone-acetaminophen, Propoxyphene n-acetaminophen, and Doxycycline  No medications prior to admission.       Review of Systems   Constitutional:  Negative for chills, diaphoresis, fatigue and fever.   HENT:  Negative for congestion.    Eyes:  Negative for discharge.   Respiratory:  Negative for cough and shortness of breath.    Cardiovascular:  Negative for chest pain.   Gastrointestinal:  Negative for constipation and diarrhea.   Endocrine:  Negative for polydipsia.   Genitourinary:  Negative for difficulty urinating.   Musculoskeletal:  Positive for arthralgias and back pain.   Skin:  Negative for pallor and wound.   Allergic/Immunologic: Negative for environmental allergies.   Neurological:  Positive for speech difficulty and weakness.        ? About chronic vs acute right foot dorsiflexion. Unclear. Rare events of difficult to talk clear when tired.   Hematological:  Negative for adenopathy.   Psychiatric/Behavioral:  Negative for agitation and behavioral problems.          Cardiovascular system: S1-S2 intact  Respiratory clear to auscultation bilateral on deep breathing he feels irritability on the left side of the chest.    Neurological Exam  Mental Status  Awake, alert and oriented to person, place and time. Recent and remote memory are intact. Able to copy figure. Clock drawing is normal. Speech is normal. Able to name objects, name parts of objects, repeat, read and write. Follows three-step commands. Able to perform serial calculations. Able to spell words backwards. Fund of knowledge is appropriate for level of education.    Cranial Nerves  CN II: Visual acuity is normal. Right funduscopic exam: disc intact. Left funduscopic exam: disc intact.  CN III, IV, VI: Extraocular movements intact bilaterally. Normal lids and orbits bilaterally.   Right pupil: Round. Reactive to light. Reactive to accommodation.  CN V:  Right: Facial sensation is normal.  Left: Facial sensation is normal on the left.  CN VII: Full and symmetric facial movement.  CN VIII:  Right: Hearing is normal.  Left: Hearing is normal.  CN IX, X:  Right: Palate is normal.  Left: Palate is normal.  CN XI:  Right: Sternocleidomastoid strength is normal.  Left: Sternocleidomastoid strength is normal.  CN XII: Tongue midline without atrophy or fasciculations.    Motor  Decreased muscle bulk throughout. Right leg extensors - mild atrophy. No fasciculations present. Decreased muscle  "tone. Right foot dorsiflexion - moderate weakness . Right foot Toes extension moderate weakness. . No abnormal involuntary movements. Strength is 5/5 in all four extremities except as noted.  Right foot and toes extensors 2/5 and Right foot eversion 2/5.    Sensory  Light touch is normal in upper and lower extremities. Pinprick is normal in upper and lower extremities. Temperature is normal in upper and lower extremities. Vibration is normal in upper and lower extremities.     Reflexes  Deep tendon reflexes are 2+ and symmetric in all four extremities.  Jaw jerk absent.  Right pathological reflexes: Dano's absent.  Left pathological reflexes: Dano's absent.  Glabellar tap absent. Snout absent. Right palmomental absent. Left palmomental absent. Right palmar grasp absent. Left palmar grasp absent.    Coordination  Right: Finger-to-nose normal. Rapid alternating movement normal. Heel-to-shin normal.Left: Finger-to-nose normal. Rapid alternating movement normal. Heel-to-shin normal.    Gait  Normal casual, toe, heel and tandem gait.        Last Recorded Vitals  Blood pressure 116/63, pulse 72, temperature 36.7 °C (98.1 °F), resp. rate 16, height 1.829 m (6' 0.01\"), weight 104 kg (229 lb), SpO2 95%.    Relevant Results    No current facility-administered medications for this encounter.    Current Outpatient Medications:     amLODIPine (Norvasc) 5 mg tablet, TAKE 1 TABLET BY MOUTH EVERY DAY, Disp: 90 tablet, Rfl: 1    Blood glucose monitoring meter kit (OneTouch Ultra2 Meter) kit, For BG check as needed for hypoglycemia, Disp: 1 each, Rfl: 0    clonazePAM (KlonoPIN) 1 mg tablet, Take 1 tablet (1 mg) by mouth 2 times a day as needed for anxiety., Disp: , Rfl:     dilTIAZem CD (Cardizem CD) 180 mg 24 hr capsule, Take 1 capsule (180 mg) by mouth once daily., Disp: 30 capsule, Rfl: 11    escitalopram (Lexapro) 10 mg tablet, TAKE 1 TABLET BY MOUTH EVERY DAY (Patient taking differently: Take 0.5 tablets (5 mg) by mouth " once daily.), Disp: 90 tablet, Rfl: 1    fluticasone (Flonase) 50 mcg/actuation nasal spray, USE 1 SPRAY EACH NOSTRIL TWICE DAILY - OR - USE 2 SPRAYS EACH NOSTRIL ONCE DAILY. (Patient taking differently: 2 sprays if needed for allergies.), Disp: 48 mL, Rfl: 1    lancets (OneTouch UltraSoft Lancets) misc, Check BG as needed for hypoglycemia, Disp: 100 each, Rfl: 2    OneTouch Ultra Test strip, Check BG as needed for symptoms of hypoglycemia, Disp: 100 strip, Rfl: 2    pantoprazole (ProtoNix) 40 mg EC tablet, Take 1 tablet (40 mg) by mouth 2 times a day. Do not crush, chew, or split., Disp: 60 tablet, Rfl: 0     Continuous medications    PRN medications, reviewed    NIH Stroke Scale  1A. Level of Consciousness: Alert, Keenly Responsive  1B. Ask Month and Age: Both Questions Right  1C. Blink Eyes & Squeeze Hands: Performs Both Tasks  2. Best Gaze: Normal  3. Visual: No Visual Loss  4. Facial Palsy: Minor Paralysis  5A. Motor - Left Arm: No Drift  5B. Motor - Right Arm: No Drift  6A. Motor - Left Leg: No Drift  6B. Motor - Right Leg: No Drift  7. Limb Ataxia: Absent  8. Sensory Loss: Normal  9. Best Language: No Aphasia  10. Dysarthria: Normal  11. Extinction and Inattention: No Abnormality  NIH Stroke Scale: 1           Ceres Coma Scale  Best Eye Response: Spontaneous  Best Verbal Response: Oriented  Best Motor Response: Follows commands  Ceres Coma Scale Score: 15             I have personally reviewed the following imaging for brain (CT Head and MR Brain) and results and discussed in detail with the patient/care giver/family: No acute stroke. See reports attached. Dated : 4-            Assessment/Plan     Clifford Recinos is a 58 y.o. male presenting with medical hx of HTN, Anxiety, hypogonadism, and depression. Who was admitted after passing out while using the rest room.  Patient likely has hypovolemia in the setting of repeated bouts of diarrhea, dehydration, working out in the sun, and use of  antihypertensives.  there is also a concern for neuropathy,troy on the right lower ext and MG   Pending ECHO and MRI brain.     Plan  Will order AChR, Ck, and aldolase levels  Check Orthostasis  Will need outpatient emg workup for neuropathy  MRI of thoracic and lumbar region (for right foot weakness)  Home bp monitoring, consider holding anti HTN in the setting of low Bps, will need outpatient follow up with PCP for bp monitoring.     The above plan was discussed with Neurology attending Dr Unruly Alarcon who is in agreement.   Thanks for consulting us, Neurology will continue to follow.   For concerns or any other questions please contact us via epic Chat.     Vincenzo Li MD  IM Resident.     Patient/Family Education: Extensive time was spent educating the patient on relevant anatomy, clinical findings and imaging, as well as discussing the potential diagnoses as discussed above.  Pharmacology: as above. Exercise: I discussed the importance of maintaining a daily exercise program, including stretching and strengthening. Preventative strategies were reviewed, specifically avoidance of any exercises that exacerbate pain.Return to online virtual visit/ clinic visit for follow-up with Dr. Magnus Epperson or any other  Neurologist in  System in 4 weeks or sooner as needed.The patient expressed understanding and agreement with the assessment and plan.  Patient encouraged to contact us should they have any questions, concerns, or any changes in symptoms. Thank you for allowing me to participate in the care of your patient.** This note is created using speech recognition transcription software. Despite proofreading, several typographical errors might be present that might affect the meaning of the content. Please call with any questions.**    ======================  Addendum by Dorian Tolliver MD  Neurology    I saw and took history, examined, reviewed labs, and reviewed images of Brain/MRA/ MRI Spine. I evaluated  the patient. I obtained the key and critical portions of the history and physical exam. I reviewed the resident's documentation and discussed the patient with the resident. I agree with the resident's medical decision-making as documented in the resident's note.          Dorian Tolliver MD

## 2024-04-29 NOTE — PROGRESS NOTES
04/29/24 1128   Discharge Planning   Living Arrangements Spouse/significant other   Support Systems Spouse/significant other   Assistance Needed A&Ox3, independent with ADLs, uses a cane PRN, drives but only during the day; room air at baseline   Type of Residence Private residence   Number of Stairs to Enter Residence 0   Number of Stairs Within Residence 1   Do you have animals or pets at home? Yes   Type of Animals or Pets 1 100 pound dog, 1 cat   Home or Post Acute Services None   Patient expects to be discharged to: PT/OT evaluated patient, [facility], recommending high intensity; patient off the floor for testing, discussed acute rehab with the patient's wife at the bedside and list provided. patient's wife states that she has helped patient in the past rehab after a stroke and feels that they will probably prefer for patient to return home with outpatient PT however she would like to see the patient ambulate in the room before making that decision   Does the patient need discharge transport arranged? No   Patient Choice   Provider Choice list and CMS website (https://medicare.gov/care-compare#search) for post-acute Quality and Resource Measure Data were provided and reviewed with: Family;Patient

## 2024-04-29 NOTE — DISCHARGE SUMMARY
Discharge Diagnosis  Vasovagal syncope, CVA ruled out, anemia hypertension, anxiety, depression, hypogonadism    Issues Requiring Follow-Up  Follow-up with neurology for nerve conduction studies  Follow-up with gastroenterology for outpatient endoscopy studies    Discharge Meds     Your medication list        CHANGE how you take these medications        Instructions Last Dose Given Next Dose Due   fluticasone 50 mcg/actuation nasal spray  Commonly known as: Flonase  What changed: See the new instructions.      USE 1 SPRAY EACH NOSTRIL TWICE DAILY - OR - USE 2 SPRAYS EACH NOSTRIL ONCE DAILY.              CONTINUE taking these medications        Instructions Last Dose Given Next Dose Due   amLODIPine 5 mg tablet  Commonly known as: Norvasc      TAKE 1 TABLET BY MOUTH EVERY DAY       Blood glucose monitoring meter kit kit  Commonly known as: OneTouch Ultra2 Meter      For BG check as needed for hypoglycemia       clonazePAM 1 mg tablet  Commonly known as: KlonoPIN           dilTIAZem  mg 24 hr capsule  Commonly known as: Cardizem CD      Take 1 capsule (180 mg) by mouth once daily.       escitalopram 10 mg tablet  Commonly known as: Lexapro      TAKE 1 TABLET BY MOUTH EVERY DAY       lancets misc  Commonly known as: OneTouch UltraSoft Lancets      Check BG as needed for hypoglycemia       OneTouch Ultra Test strip  Generic drug: blood sugar diagnostic      Check BG as needed for symptoms of hypoglycemia                Test Results Pending At Discharge  Pending Labs       Order Current Status    Acetylcholine receptor, binding In process    Aldolase In process            Hospital Course   Clifford Recinos is a 58 y.o. male with a medical history of hypogonadism, hypertension, anxiety, and depression who presented to the emergency room after passing out on the toilet and is found to have concerns for CVA.  The patient had a syncopal event while using the restroom and upon waking up was found to have right-sided  weakness, dysarthria and disequilibrium.  Patient and wife did report symptoms seem to recur on a nightly basis and were further exacerbated by an active day.  In the emergency room CTA was done which showed no flow-limiting stenosis or acute changes.  MRI of the brain T-spine and L-spine showed no chronic changes but did reveal degenerative disc disc disease more prominent at L5/S1.  Neurology followed due to anemia as he was noted to have a slight drop in his hemoglobin from baseline.  They advised for twice daily Protonix and outpatient follow-up to consider endoscopy.    Pertinent Physical Exam At Time of Discharge  Physical Exam  Gen: lying comfortably in bed, not in acute distress  HEENT: atraumatic, normocephalic  Pulm: normal respiratory effort, clear to auscultation b/l  Cardiac: RRR, no murmurs noted, normal S1/S2  GI: Soft, nontender, BS+  MSK: normal ROM without joint swelling  Extremities: no LE edema, cyanosis  Neuro: AOX3, speech slightly dysarthric, weakness present on right upper and lower extremity however seems to have improved from prior exam retained sensation  Psych: calm and appropriate for situation   Outpatient Follow-Up  Future Appointments   Date Time Provider Department Center   5/7/2024  1:30 PM Ada Langley MD [id number] [facility]   5/9/2024  1:00 PM Mary Quinteros APRN-CNP [id number] [facility]   5/30/2024  3:30 PM Samir Mena MD [id number] [facility]   6/24/2024  1:00 PM Venus Tran PA-C [id number] [facility]   7/30/2024  2:00 PM Sharon Sainz MD [id number] [facility]         Isaias Clinton DO

## 2024-04-29 NOTE — PROGRESS NOTES
Occupational Therapy    Evaluation    Patient Name: Clifford Recinos  MRN: [id number]  Today's Date: 4/29/2024  Time Calculation  Start Time: 0854  Stop Time: 0923  Time Calculation (min): 29 min    Assessment  IP OT Assessment  OT Assessment: Pt presents with decreased R UE/ LE weakness, decreased balance, and endurance. Pt to benefit from skilled OT services to increase independence with ADL's, transfers, and mobility  Prognosis: Good  Barriers to Discharge: None  Evaluation/Treatment Tolerance: Patient tolerated treatment well  Medical Staff Made Aware: Yes  End of Session Communication: Bedside nurse  End of Session Patient Position: Up in chair, Alarm on  Plan:  Treatment Interventions: ADL retraining, Functional transfer training, UE strengthening/ROM, Endurance training, Neuromuscular reeducation  OT Frequency: 4 times per week  OT Discharge Recommendations: High intensity level of continued care  Equipment Recommended upon Discharge:  (TBD)  OT Recommended Transfer Status: Minimal assist, Moderate assist, Assist of 1  OT - OK to Discharge: Yes (per OT POC)    Subjective   Current Problem:  1. Ischemic stroke (Multi)  Transthoracic Echo (TTE) Complete    Transthoracic Echo (TTE) Complete      2. Arrhythmia, vagal  Transthoracic Echo (TTE) Complete    Transthoracic Echo (TTE) Complete        General:  General  Reason for Referral: Pt is a 57 y/o male who presented to the emergency room after passing out on the toilet.  Past Medical History Relevant to Rehab: Past medical history of hypogonadism, hypertension, anxiety, and depression  Family/Caregiver Present: No  Co-Treatment: PT  Co-Treatment Reason: to maximize pt's outcomes  Prior to Session Communication: Bedside nurse  Patient Position Received: Bed, 3 rail up, Alarm on  General Comment: Pt pleasant and agreeable to OT eval. Pt presenting with R UE/LE. CT was negative  Precautions:  Medical Precautions: Fall precautions, Seizure precautions     Pain:  Pain  Assessment  Pain Assessment: 0-10  Pain Score: 3  Pain Type: Acute pain  Pain Location: Other (Comment) (head and R LE all the way to his foot)    Objective   Cognition:  Overall Cognitive Status: Within Functional Limits  Orientation Level: Oriented X4     Home Living:  Type of Home: House  Lives With: Spouse  Home Adaptive Equipment: Cane  Home Layout: One level, Stairs to alternate level with rails  Alternate Level Stairs-Rails: None  Alternate Level Stairs-Number of Steps: 1  Home Access: Stairs to enter without rails  Entrance Stairs-Rails: None  Entrance Stairs-Number of Steps: 1  Bathroom Shower/Tub: Tub/shower unit  Bathroom Toilet: Standard  Bathroom Equipment: None   Prior Function:  Level of [place]: Independent with ADLs and functional transfers, Needs assistance with homemaking  Receives Help From: Family  ADL Assistance: Independent  Homemaking Assistance: Needs assistance  Ambulatory Assistance: Independent  Prior Function Comments: Pt uses no AD and is independent with ADL's. Wife assists with IADL's.     ADL:  ADL Comments: Based on clinical presentation, anticipate min-mod A for LB ADL's  Activity Tolerance:  Endurance: Tolerates less than 10 min exercise, no significant change in vital signs  Bed Mobility/Transfers:   Bed Mobility  Bed Mobility: Yes  Bed Mobility 1  Bed Mobility 1: Supine to sitting  Level of Assistance 1: Close supervision  Bed Mobility Comments 1: use of bedrail    Transfers  Transfer: Yes  Transfer 1  Transfer From 1: Sit to, Stand to  Transfer to 1: Sit, Stand  Technique 1: Sit to stand, Stand to sit  Transfer Level of Assistance 1: Contact guard    Functional Mobility:  Functional Mobility  Functional Mobility Performed: Yes  Functional Mobility 1  Surface 1: Level tile  Device 1: No device  Assistance 1: Hand held assistance, Minimum assistance, Moderate assistance  Comments 1: Pt ambulated to the doorway and back with HHA at min-mod A for balance with multi-directional  turns  Sitting Balance:  Static Sitting Balance  Static Sitting-Balance Support: Feet supported, No upper extremity supported  Static Sitting-Level of Assistance: Distant supervision  Standing Balance:  Static Standing Balance  Static Standing-Balance Support: Left upper extremity supported  Static Standing-Level of Assistance: Contact guard  Dynamic Standing Balance  Dynamic Standing-Balance Support: Left upper extremity supported  Dynamic Standing-Comments: min-mod A    Vision: Vision - Basic Assessment  Current Vision: Wears glasses all the time  Visual History: Other (Comment) (Pt reports he has decreased peripheral vision in his R eye)    Sensation:  Light Touch: No apparent deficits  Strength:  Strength Comments: L UE: 5/5; R UE: 3+/5      Hand Function:  Hand Function  Gross Grasp: Functional  Coordination: Functional  Extremities: RUE   RUE : Within Functional Limits and LUE   LUE: Within Functional Limits    Outcome Measures: [facility] Daily Activity  Putting on and taking off regular lower body clothing: A lot  Bathing (including washing, rinsing, drying): A lot  Putting on and taking off regular upper body clothing: A little  Toileting, which includes using toilet, bedpan or urinal: A lot  Taking care of personal grooming such as brushing teeth: None  Eating Meals: None  Daily Activity - Total Score: 17      Education Documentation  Body Mechanics, taught by Ann-Marie Miranda, OT at 4/29/2024 10:12 AM.  Learner: Patient  Readiness: Acceptance  Method: Explanation  Response: Verbalizes Understanding, Needs Reinforcement    Education Comments  No comments found.      Goals:   Encounter Problems       Encounter Problems (Active)       OT Goals       Pt to demonstrate transfers with LRAD at mod I        Start:  04/29/24    Expected End:  05/13/24            Pt will demo increased functional mobility to tolerate tasks necessary to complete ADL routine with LRAD at  mod I        Start:  04/29/24    Expected  End:  05/13/24            Pt will demo FAIR+ static/dynamic standing balance during ADL and functional activity with LRAD >5 minutes for improved independence and participation in ADL        Start:  04/29/24    Expected End:  05/13/24            Pt to increase R UE strength to 4+/5 for carryover into ADL's       Start:  04/29/24    Expected End:  05/13/24            Pt to demonstrate LB dressing, bathing, and toileting with LRAD at mod I        Start:  04/29/24    Expected End:  05/13/24

## 2024-04-29 NOTE — PROGRESS NOTES
Physical Therapy    Physical Therapy Evaluation & Treatment    Patient Name: Clifford Recinos  MRN: [id number]  Today's Date: 4/29/2024   Time Calculation  Start Time: 0855  Stop Time: 0924  Time Calculation (min): 29 min    Assessment/Plan   PT Assessment  PT Assessment Results: Decreased strength, Impaired balance, Decreased mobility, Decreased coordination, Pain  Rehab Prognosis: Good  Evaluation/Treatment Tolerance: Patient tolerated treatment well  Medical Staff Made Aware: Yes  Strengths: Ability to acquire knowledge, Capable of completing ADLs semi/independent, Housing layout, Support of extended family/friends  Barriers to Participation: Comorbidities, Premorbid level of function  End of Session Communication: Bedside nurse  Assessment Comment: Pt. presents with significant balance deficits, strength deficits, and postural awareness/right reaction deficits which contribute to his need for assist to safely complete basic mobility tasks.  Pt. is at HIGH RISK FOR FALLS.  Pt. has h/o frequent falls at home for years.  Pt. has received no therapy.  Recommend PT intervention during acute hospital stay and HIGH intensity PT intervention upon DC from hospital.  End of Session Patient Position: Up in chair, Alarm on   IP OR SWING BED PT PLAN  Inpatient or Swing Bed: Inpatient  PT Plan  Treatment/Interventions: Gait training, Stair training, Balance training, Neuromuscular re-education, Strengthening  PT Plan: Skilled PT  PT Frequency: 4 times per week  PT Discharge Recommendations: High intensity level of continued care, Other (comment) (Pt. not interested in acute rehab, willing to consider home health PT.  Pt. has support at home and has been dealing with balance deficits for years.  Pt. did not demo understanding of value/benefit of PT.)  PT Recommended Transfer Status: Assist x1  PT - OK to Discharge: Yes      Subjective     General Visit Information:  General  Reason for Referral: Pt is a 57 y/o male who presented to  "the emergency room after passing out on the toilet.  Past Medical History Relevant to Rehab: PMH: hypogonadism, hypertension, anxiety, and depression; pt. reports seizures as a child, \"passing out\" episode ~15-20 years ago resulting in \"helicopter to hospital\"  Family/Caregiver Present: No  Co-Treatment: OT  Co-Treatment Reason: to maximize pt. safety and mobility  Prior to Session Communication: Bedside nurse  Patient Position Received: Bed, 3 rail up, Alarm on  General Comment: Pt pleasant and agreeable to PT eval. Pt presenting with R UE/LE weakness.  Speech studder premorbid. CT was negative  Home Living:  Home Living  Type of Home: House  Lives With: Spouse  Home Adaptive Equipment: Cane  Home Layout: One level, Stairs to alternate level with rails  Alternate Level Stairs-Rails: None  Alternate Level Stairs-Number of Steps: 1  Home Access: Stairs to enter without rails  Entrance Stairs-Rails: None  Entrance Stairs-Number of Steps: 1  Bathroom Shower/Tub: Tub/shower unit  Bathroom Toilet: Standard  Bathroom Equipment: None  Prior Level of Function:  Prior Function Per Pt/Caregiver Report  Level of [place]: Independent with ADLs and functional transfers, Needs assistance with homemaking  ADL Assistance: Independent  Homemaking Assistance: Needs assistance  Ambulatory Assistance: Independent (no device)  Precautions:  Precautions  Medical Precautions: Fall precautions, Seizure precautions  Precautions Comment: Pt. reports frequent falls at home.  Vital Signs:       Objective   Pain:  Pain Assessment  Pain Assessment: 0-10  Pain Score: 3  Pain Type: Chronic pain  Pain Location: Foot (and leg)  Pain Orientation: Right  Cognition:  Cognition  Overall Cognitive Status: Within Functional Limits  Orientation Level: Oriented X4    General Assessments:                  Activity Tolerance  Endurance: Tolerates less than 10 min exercise, no significant change in vital signs  Early Mobility/Exercise Safety Screen: " "Proceed with mobilization - No exclusion criteria met    Sensation  Sensation Comment: reports numbness/tingling in B hands and R foot    Strength  Strength Comments: LUE 5/5, RUE 3+/5; LLE 4/5, RLE: hip flex 3+/5, knee ext 3+/5, ankle DF 2/5, ankle PF 3-/5  Strength  Strength Comments: LUE 5/5, RUE 3+/5; LLE 4/5, RLE: hip flex 3+/5, knee ext 3+/5, ankle DF 2/5, ankle PF 3-/5           Coordination  Coordination Comment: movement is slow bilaterally    Postural Control  Righting Reactions: impaired in standing    Static Sitting Balance  Static Sitting-Balance Support: No upper extremity supported, Feet supported  Static Sitting-Level of Assistance: Independent  Dynamic Sitting Balance  Dynamic Sitting-Balance Support: No upper extremity supported, Feet supported  Dynamic Sitting-Balance: Reaching for objects, Reaching across midline  Dynamic Sitting-Comments: Independent    Static Standing Balance  Static Standing-Balance Support: No upper extremity supported  Static Standing-Level of Assistance: Distant supervision  Static Standing-Comment/Number of Minutes: with wide SAVANAH pt. able to maintain static standing IND to distant SUP.  HOWEVER, balance deteriorates quickly with narrow SAVANAH or eyes closed - pt. demo complete LOB needing MOD A to prevent fall  Dynamic Standing Balance  Dynamic Standing-Balance Support: Left upper extremity supported  Dynamic Standing-Balance: Tandem stance, Turning  Dynamic Standing-Comments: POOR.  Complete LOB with attempted tandem stance needing MOD A to prevent fall.  Pt. states \"it has been like this for a long time\"  Functional Assessments:  Bed Mobility  Bed Mobility: Yes  Bed Mobility 1  Bed Mobility 1: Supine to sitting  Level of Assistance 1: Close supervision  Bed Mobility Comments 1: use of bedrail and RUE to assist self to seated position at EOB    Transfers  Transfer: Yes  Transfer 1  Transfer From 1: Sit to, Stand to  Transfer to 1: Sit, Stand  Technique 1: Sit to stand, Stand " to sit  Transfer Level of Assistance 1: Contact guard    Ambulation/Gait Training  Ambulation/Gait Training Performed: Yes  Ambulation/Gait Training 1  Surface 1: Level tile  Device 1: No device  Assistance 1: Moderate assistance, Minimum assistance, Loss of balance (Comment)  Quality of Gait 1: Wide base of support, Inconsistent stride length, Decreased step length, Ataxic, Diminished heel strike (slides R foot along floor (lacking R foot clearance); multidirectional LOB; poor righting reactions during ambulation)  Comments/Distance (ft) 1: 35 ft in room with multiple directional changes with no device initially with no HHA then after multiple episodes LOB, pt. accepted Hand hold assist by therapist.  LOB is multidirectional.      Extremity/Trunk Assessments:  RUE   RUE : Within Functional Limits  LUE   LUE: Within Functional Limits  RLE   RLE : Within Functional Limits  LLE   LLE : Within Functional Limits  Treatments:  Ambulation/Gait Training  Ambulation/Gait Training Performed: Yes  Ambulation/Gait Training 1  Surface 1: Level tile  Device 1: No device  Assistance 1: Fluctuates from Minimum to Moderate assistance, Loss of balance (multi-directional)  Quality of Gait 1: Wide base of support, Inconsistent stride length, Decreased step length, Ataxic, Diminished heel strike (slides R foot along floor (lacking R foot clearance); multidirectional LOB; poor righting reactions during ambulation)  Comments/Distance (ft) 1: 35 ft in room with multiple directional changes with no device initially with no HHA then after multiple episodes LOB, pt. accepted Hand hold assist by therapist.  LOB is multidirectional.  Educated pt. re: gait deficits and benefits of therapy for improved safety and function during gait.  Initiated gait training addressing frequent LOB, R foot clearance, RLE swing thru.  Needs reinforcement  Outcome Measures:  [facility] Basic Mobility  Turning from your back to your side while in a flat bed without  using bedrails: None  Moving from lying on your back to sitting on the side of a flat bed without using bedrails: None  Moving to and from bed to chair (including a wheelchair): A little  Standing up from a chair using your arms (e.g. wheelchair or bedside chair): A little  To walk in hospital room: A lot  Climbing 3-5 steps with railing: A lot  Basic Mobility - Total Score: 18    Encounter Problems       Encounter Problems (Active)       Balance       STG - Maintains dynamic standing balance with upper extremity support IND x 10 min in order to complete a functional task.       Start:  04/29/24    Expected End:  05/13/24       INTERVENTIONS:  1. Practice standing with minimal support.  2. Educate patient about standing tolerance.  3. Educate patient about independence with gait, transfers, and ADL's.  4. Educate patient about use of assistive device.  5. Educate patient about self-directed care.            Mobility       STG - Patient will ambulate 200+ ft IND with LRAD with no LOB, improved R foot clearance, functional stride length, and steady with directional changes       Start:  04/29/24    Expected End:  05/13/24            STG - Patient will ambulate up and down a curb/step IND with LRAD       Start:  04/29/24    Expected End:  05/13/24               PT Transfers       STG - Patient will transfer sit to and from stand IND with LRAD, to/from multi-type surfaces steady with turns to approach chair       Start:  04/29/24    Expected End:  05/13/24                   Education Documentation  Precautions, taught by Trish Maloney, PT at 4/29/2024 10:44 AM.  Learner: Patient  Readiness: Acceptance  Method: Explanation  Response: Verbalizes Understanding, Needs Reinforcement    Mobility Training, taught by Trish Maloney, PT at 4/29/2024 10:44 AM.  Learner: Patient  Readiness: Acceptance  Method: Explanation  Response: Verbalizes Understanding, Needs Reinforcement    Education Comments  No comments  found.

## 2024-04-29 NOTE — CARE PLAN
Problem: General Stroke  Goal: Establish a mutual long term goal with patient by discharge  Outcome: Progressing     Problem: General Stroke  Goal: Demonstrate improvement in neurological exam throughout the shift  Outcome: Not Progressing   The patient's goals for the shift include  Show improvements in neurological staatis during shift    The clinical goals for the shift include patient will show improvement in mental status this shift    Over the shift, the patient did not have any improvements in neurological statur during shift.    Problem: General Stroke  Goal: Demonstrate improvement in neurological exam throughout the shift  Outcome: Not Progressing

## 2024-04-29 NOTE — CONSULTS
Consults    Reason For Consult  Anemia in the setting of a history of GIB     History Of Present Illness  Clifford Recinos is a 58 y.o. male presenting with past medical history of hypogonadism, hypertension, anxiety, depression, who presented to the ED after his wife found him passed out on the toilet.    Patient was in his usual state of health until 1 day ago, when he developed sudden onset of abdominal cramping, associated with diarrhea.  Patient stated that about noon of the same day, he was having a diarrheal stool when he suddenly passed out.  His wife witnessed the episode, she stated that she was unable to catch him when he passed out.  He however did not have a head injury component of fall.  When regaining consciousness, she noted that patient's gait was wobbly and he needed help to ambulate to the living room.  At the time EMS was activated, when EMS arrived they found patient was dysarthric with right-sided weakness.  Stroke team was consulted and TNK was advised, patient's family however declined TNK, considering significant history of GI bleed.    GI history  Patient has a significant GI history, although he was not setting of the chronology of events, his first ever GI bleed was about 6 to 7 years ago.  Patient stated that at the time, he was recently started on Celebrex for osteoarthritis.  He stated that post Celebrex therapy, he started having recurrent during episodes of GI bleed and melena stools.  He stated that the bleeding was very significant, requiring him to go to the ED.  While in the ED, blood pressure was not obtained, and patient subsequently coded.  He was resuscitated and transfused with an onset and amounts of blood.  During that admission, he had an EGD and also had a colonoscopy.  He is uncertain what the reading of the colonoscopy and EGD were, however thinks they were fine at that time.  Patient records showed no recent colonoscopy records, however there were documentation however in  the PCP notes stating that colonoscopy was fine. A cologuard result from 2021 showed no concern for CRC. Patients PCP documentation also states that he has had EGDs and a capsule endoscopy. Patient states that 3 weeks prior to presentation, he had an episode of melena stools and the most recent episode was 12 days prior     Past Medical History  He has a past medical history of Fracture of fourth toe, left, closed, initial encounter (10/18/2019), Fracture of fourth toe, left, open, with routine healing, subsequent encounter (10/14/2019), Other instability, left knee (05/02/2018), Pain in left knee (05/02/2018), Pain in thoracic spine (02/21/2022), Unspecified injury of thorax, initial encounter (02/21/2022), and Wedge compression fracture of unspecified thoracic vertebra, initial encounter for closed fracture (Multi) (02/23/2022).    Surgical History  He has a past surgical history that includes Other surgical history (02/21/2022) and Other surgical history (02/21/2022).     Social History  He reports that he has never smoked. He has never used smokeless tobacco. He reports current alcohol use. He reports that he does not use drugs.    Family History  Family History   Problem Relation Name Age of Onset    Other (CABG) Mother      Other (cardiac pacemaker) Father      Other (malignant neoplasm of prostate) Father      Other (sepsis) Father      Other (stroke syndrome) Father      Other (PTCA) Sister          Allergies  Ketorolac tromethamine, Sudafed [pseudoephedrine hcl], Cefdinir, Levofloxacin, Nsaids (non-steroidal anti-inflammatory drug), Oxycodone-acetaminophen, Propoxyphene n-acetaminophen, and Doxycycline    Review of Systems  Review of Systems   Constitutional: Negative.    Respiratory: Negative.     Cardiovascular: Negative.    Gastrointestinal:  Positive for abdominal pain and blood in stool.   Genitourinary: Negative.    Musculoskeletal:  Positive for arthralgias and joint swelling.   Skin: Negative.     Neurological: Negative.    Hematological: Negative.    Psychiatric/Behavioral: Negative.        Physical Exam  Physical Exam  Constitutional:       Appearance: Normal appearance.   Cardiovascular:      Rate and Rhythm: Normal rate and regular rhythm.      Pulses: Normal pulses.      Heart sounds: Normal heart sounds.   Pulmonary:      Effort: Pulmonary effort is normal.      Breath sounds: Normal breath sounds.   Abdominal:      General: Bowel sounds are normal.      Palpations: Abdomen is soft.      Tenderness: There is abdominal tenderness in the right lower quadrant.   Musculoskeletal:         General: Normal range of motion.   Skin:     General: Skin is warm and dry.   Neurological:      General: No focal deficit present.      Mental Status: He is alert and oriented to person, place, and time.        Last Recorded Vitals  /74   Pulse 56   Temp 36.7 °C (98.1 °F)   Resp 16   Wt 104 kg (229 lb)   SpO2 95%     Relevant Results  Results for orders placed or performed during the hospital encounter of 04/28/24 (from the past 24 hour(s))   POCT GLUCOSE   Result Value Ref Range    POCT Glucose 134 (H) 74 - 99 mg/dL   CBC and Auto Differential   Result Value Ref Range    WBC 4.7 4.4 - 11.3 x10*3/uL    nRBC 0.0 0.0 - 0.0 /100 WBCs    RBC 2.70 (L) 4.50 - 5.90 x10*6/uL    Hemoglobin 8.9 (L) 13.5 - 17.5 g/dL    Hematocrit 27.8 (L) 41.0 - 52.0 %     (H) 80 - 100 fL    MCH 33.0 26.0 - 34.0 pg    MCHC 32.0 32.0 - 36.0 g/dL    RDW 17.1 (H) 11.5 - 14.5 %    Platelets 469 (H) 150 - 450 x10*3/uL    Neutrophils % 72.5 40.0 - 80.0 %    Immature Granulocytes %, Automated 0.2 0.0 - 0.9 %    Lymphocytes % 18.5 13.0 - 44.0 %    Monocytes % 6.4 2.0 - 10.0 %    Eosinophils % 1.5 0.0 - 6.0 %    Basophils % 0.9 0.0 - 2.0 %    Neutrophils Absolute 3.38 1.20 - 7.70 x10*3/uL    Immature Granulocytes Absolute, Automated 0.01 0.00 - 0.70 x10*3/uL    Lymphocytes Absolute 0.86 (L) 1.20 - 4.80 x10*3/uL    Monocytes Absolute 0.30  0.10 - 1.00 x10*3/uL    Eosinophils Absolute 0.07 0.00 - 0.70 x10*3/uL    Basophils Absolute 0.04 0.00 - 0.10 x10*3/uL   Comprehensive metabolic panel   Result Value Ref Range    Glucose 123 (H) 74 - 99 mg/dL    Sodium 144 136 - 145 mmol/L    Potassium 3.6 3.5 - 5.3 mmol/L    Chloride 104 98 - 107 mmol/L    Bicarbonate 26 21 - 32 mmol/L    Anion Gap 18 10 - 20 mmol/L    Urea Nitrogen 12 6 - 23 mg/dL    Creatinine 0.74 0.50 - 1.30 mg/dL    eGFR >90 >60 mL/min/1.73m*2    Calcium 9.8 8.6 - 10.3 mg/dL    Albumin 4.9 3.4 - 5.0 g/dL    Alkaline Phosphatase 43 33 - 120 U/L    Total Protein 7.3 6.4 - 8.2 g/dL    AST 13 9 - 39 U/L    Bilirubin, Total 0.8 0.0 - 1.2 mg/dL    ALT 15 10 - 52 U/L   Troponin I, High Sensitivity   Result Value Ref Range    Troponin I, High Sensitivity 4 0 - 20 ng/L   Protime-INR   Result Value Ref Range    Protime 13.5 (H) 9.8 - 12.8 seconds    INR 1.2 (H) 0.9 - 1.1   APTT   Result Value Ref Range    aPTT 30 27 - 38 seconds   ECG 12 lead   Result Value Ref Range    Ventricular Rate 57 BPM    Atrial Rate 57 BPM    RI Interval 220 ms    QRS Duration 102 ms    QT Interval 442 ms    QTC Calculation(Bazett) 430 ms    P Axis 76 degrees    R Axis 29 degrees    T Axis 53 degrees    QRS Count 9 beats    Q Onset 220 ms    P Onset 110 ms    P Offset 157 ms    T Offset 441 ms    QTC Fredericia 434 ms   BLOOD GAS VENOUS FULL PANEL   Result Value Ref Range    POCT pH, Venous 7.39 7.33 - 7.43 pH    POCT pCO2, Venous 42 41 - 51 mm Hg    POCT pO2, Venous 48 (H) 35 - 45 mm Hg    POCT SO2, Venous 79 (H) 45 - 75 %    POCT Oxy Hemoglobin, Venous 76.4 (H) 45.0 - 75.0 %    POCT Hematocrit Calculated, Venous 25.0 (L) 41.0 - 52.0 %    POCT Sodium, Venous 139 136 - 145 mmol/L    POCT Potassium, Venous 3.7 3.5 - 5.3 mmol/L    POCT Chloride, Venous 110 (H) 98 - 107 mmol/L    POCT Ionized Calicum, Venous 1.24 1.10 - 1.33 mmol/L    POCT Glucose, Venous 117 (H) 74 - 99 mg/dL    POCT Lactate, Venous 1.6 0.4 - 2.0 mmol/L    POCT  Base Excess, Venous 0.4 -2.0 - 3.0 mmol/L    POCT HCO3 Calculated, Venous 25.4 22.0 - 26.0 mmol/L    POCT Hemoglobin, Venous 8.2 (L) 13.5 - 17.5 g/dL    POCT Anion Gap, Venous 7.0 (L) 10.0 - 25.0 mmol/L    Patient Temperature      FiO2 21 %   Lipid Panel   Result Value Ref Range    Cholesterol 128 0 - 199 mg/dL    HDL-Cholesterol 43.4 mg/dL    Cholesterol/HDL Ratio 2.9     LDL Calculated 70 <=99 mg/dL    VLDL 14 0 - 40 mg/dL    Triglycerides 71 0 - 149 mg/dL    Non HDL Cholesterol 85 0 - 149 mg/dL   Hemoglobin A1C   Result Value Ref Range    Hemoglobin A1C 4.8 see below %    Estimated Average Glucose 91 Not Established mg/dL   B-Type Natriuretic Peptide   Result Value Ref Range     (H) 0 - 99 pg/mL   Renal Function Panel   Result Value Ref Range    Glucose 122 (H) 74 - 99 mg/dL    Sodium 141 136 - 145 mmol/L    Potassium 3.8 3.5 - 5.3 mmol/L    Chloride 107 98 - 107 mmol/L    Bicarbonate 25 21 - 32 mmol/L    Anion Gap 13 10 - 20 mmol/L    Urea Nitrogen 9 6 - 23 mg/dL    Creatinine 0.60 0.50 - 1.30 mg/dL    eGFR >90 >60 mL/min/1.73m*2    Calcium 9.3 8.6 - 10.3 mg/dL    Phosphorus 3.9 2.5 - 4.9 mg/dL    Albumin 4.4 3.4 - 5.0 g/dL   CBC   Result Value Ref Range    WBC 3.4 (L) 4.4 - 11.3 x10*3/uL    nRBC 0.0 0.0 - 0.0 /100 WBCs    RBC 2.71 (L) 4.50 - 5.90 x10*6/uL    Hemoglobin 8.9 (L) 13.5 - 17.5 g/dL    Hematocrit 28.1 (L) 41.0 - 52.0 %     (H) 80 - 100 fL    MCH 32.8 26.0 - 34.0 pg    MCHC 31.7 (L) 32.0 - 36.0 g/dL    RDW 16.9 (H) 11.5 - 14.5 %    Platelets 396 150 - 450 x10*3/uL         Assessment/Plan   Clifford Recinos is a 58 y.o. male presenting with past medical history of hypogonadism, hypertension, anxiety, depression, who presented to the ED after his wife found him passed out on the toilet. GI is now consulted because of significant GI of GIB and his recent episodes of melena stools in the setting of anemia     #Anemia likely in the setting of chronic blood loss  #Hx of Melena Stools   -Patient was  admitted for CVA like symptoms in the setting of syncope at home  -work up for syncope   -Continue medical management as already established  -GI is consulted for anemia of chronic blood loss, patient states that he recently had repeat melena stools 3 weeks and 12 days prior to presnetation  -Patient will benefit from GI work up on out patient basis, to include EGD and colonoscopy   -Recommend pantoprazole 40mg BID in the setting of GIB       Erickson Davila MD    I saw and evaluated the patient. I personally obtained the key and critical portions of the history and physical exam or was physically present for key and critical portions performed by the resident. I reviewed the resident's documentation and discussed the patient with the resident. I agree with the resident's medical decision making as documented in the note.    Patient with history of GI bleeding, IBS-mixed pattern, anemia admitted with syncope.  Suspected melena 3 weeks ago.  History of severe GI bleeding at the age of 20  Previous GI workup outside hospital.    EGD, colonoscopy after syncope workup, can be done as in/outpatient

## 2024-04-30 ENCOUNTER — DOCUMENTATION (OUTPATIENT)
Dept: HOME HEALTH SERVICES | Facility: HOME HEALTH | Age: 59
End: 2024-04-30
Payer: COMMERCIAL

## 2024-04-30 ENCOUNTER — HOME HEALTH ADMISSION (OUTPATIENT)
Dept: HOME HEALTH SERVICES | Facility: HOME HEALTH | Age: 59
End: 2024-04-30
Payer: COMMERCIAL

## 2024-04-30 ENCOUNTER — PATIENT OUTREACH (OUTPATIENT)
Dept: CARE COORDINATION | Facility: CLINIC | Age: 59
End: 2024-04-30
Payer: COMMERCIAL

## 2024-04-30 LAB
ATRIAL RATE: 57 BPM
P AXIS: 76 DEGREES
P OFFSET: 157 MS
P ONSET: 110 MS
PR INTERVAL: 220 MS
Q ONSET: 220 MS
QRS COUNT: 9 BEATS
QRS DURATION: 102 MS
QT INTERVAL: 442 MS
QTC CALCULATION(BAZETT): 430 MS
QTC FREDERICIA: 434 MS
R AXIS: 29 DEGREES
T AXIS: 53 DEGREES
T OFFSET: 441 MS
VENTRICULAR RATE: 57 BPM

## 2024-04-30 ASSESSMENT — ENCOUNTER SYMPTOMS
BACK PAIN: 1
WOUND: 0
SPEECH DIFFICULTY: 1
POLYDIPSIA: 0
DIFFICULTY URINATING: 0
DIARRHEA: 0
COUGH: 0
WEAKNESS: 1
ARTHRALGIAS: 1
DIAPHORESIS: 0
FEVER: 0
FATIGUE: 0
CHILLS: 0
AGITATION: 0
ADENOPATHY: 0
EYE DISCHARGE: 0
SHORTNESS OF BREATH: 0
CONSTIPATION: 0

## 2024-04-30 ASSESSMENT — VISUAL ACUITY: VA_NORMAL: 1

## 2024-04-30 NOTE — HH CARE COORDINATION
Home Care received a referral for Nursing, Physical Therapy, and Occupational Therapy. Unfortunately, we are unable to accept and process the referral at this time.    Patients, please reach out to the referring provider or your PCP to assist in obtaining an alternative home care agency and/or guidance to meet your needs.    Providers, please reach out to  Home Care with any questions regarding the declined referral.    Comments:     Last Office Visit (last PCP visit):   5/1/2023    Next Visit Date:  Future Appointments   Date Time Provider [address]   11/2/2023  8:00 AM Brice Simmons MD [facility]       **If hasn't been seen in over a year OR hasn't followed up according to last diabetes/ADHD visit, make appointment for patient before sending refill to provider.     Rx requested:  Requested Prescriptions     Pending Prescriptions Disp Refills    gabapentin (NEURONTIN) 600 MG tablet [Pharmacy Med Name: GABAPENTIN 600 MG TABLET] 180 tablet 2     Sig: take 1 tablet by mouth six times a day

## 2024-04-30 NOTE — PROGRESS NOTES
Discharge Facility: [facility]   Discharge Diagnosis: Vasovagal syncope, CVA ruled out, anemia hypertension, anxiety, depression, hypogonadism   Admission Date:  4-28-24  Discharge Date: 4-29-24    PCP Appointment Date: Message routed to office for scheduling    Specialist Appointment Date:   5/9/2024 1:00 PM (30 min)  [facility]    CARDIOLOGY ESTABLISHED PATIENT     Hospital Encounter and Summary: Linked   Jose Carl LPN

## 2024-05-01 NOTE — SIGNIFICANT EVENT
Follow Up Phone Call    Outgoing phone call    Spoke to: Clifford Recinos Relationship:self   Phone number: [phone]      Outcome: contacted patient/ family   Chief Complaint   Patient presents with   • Stroke          Diagnosis:Not applicable    States he is feeling better. No further questions or concerns.

## 2024-05-02 LAB
ACHR BIND AB SER-SCNC: 0 NMOL/L (ref 0–0.4)
ALDOLASE SERPL-CCNC: 2.9 U/L (ref 1.2–7.6)

## 2024-05-04 DIAGNOSIS — K92.1 MELENA: Primary | ICD-10-CM

## 2024-05-04 DIAGNOSIS — D64.9 ANEMIA, UNSPECIFIED TYPE: ICD-10-CM

## 2024-05-04 RX ORDER — POLYETHYLENE GLYCOL 3350, SODIUM SULFATE ANHYDROUS, SODIUM BICARBONATE, SODIUM CHLORIDE, POTASSIUM CHLORIDE 236; 22.74; 6.74; 5.86; 2.97 G/4L; G/4L; G/4L; G/4L; G/4L
4000 POWDER, FOR SOLUTION ORAL ONCE
Qty: 4000 ML | Refills: 0 | Status: SHIPPED | OUTPATIENT
Start: 2024-05-04 | End: 2024-05-04

## 2024-05-07 ENCOUNTER — OFFICE VISIT (OUTPATIENT)
Dept: UROLOGY | Facility: CLINIC | Age: 59
End: 2024-05-07
Payer: COMMERCIAL

## 2024-05-07 VITALS — BODY MASS INDEX: 31.06 KG/M2 | TEMPERATURE: 97.6 F | HEIGHT: 72 IN

## 2024-05-07 DIAGNOSIS — R97.20 ABNORMAL PSA: ICD-10-CM

## 2024-05-07 DIAGNOSIS — N40.1 NOCTURIA ASSOCIATED WITH BENIGN PROSTATIC HYPERPLASIA: ICD-10-CM

## 2024-05-07 DIAGNOSIS — N13.8 BPH WITH OBSTRUCTION/LOWER URINARY TRACT SYMPTOMS: ICD-10-CM

## 2024-05-07 DIAGNOSIS — R35.1 NOCTURIA ASSOCIATED WITH BENIGN PROSTATIC HYPERPLASIA: ICD-10-CM

## 2024-05-07 DIAGNOSIS — N40.1 BPH WITH OBSTRUCTION/LOWER URINARY TRACT SYMPTOMS: ICD-10-CM

## 2024-05-07 LAB
POC APPEARANCE, URINE: CLEAR
POC BILIRUBIN, URINE: NEGATIVE
POC BLOOD, URINE: NEGATIVE
POC COLOR, URINE: YELLOW
POC GLUCOSE, URINE: NEGATIVE MG/DL
POC KETONES, URINE: NEGATIVE MG/DL
POC LEUKOCYTES, URINE: NEGATIVE
POC NITRITE,URINE: NEGATIVE
POC PH, URINE: 7 PH
POC PROTEIN, URINE: NEGATIVE MG/DL
POC SPECIFIC GRAVITY, URINE: 1.02
POC UROBILINOGEN, URINE: 0.2 EU/DL

## 2024-05-07 PROCEDURE — 51741 ELECTRO-UROFLOWMETRY FIRST: CPT | Performed by: UROLOGY

## 2024-05-07 PROCEDURE — 99204 OFFICE O/P NEW MOD 45 MIN: CPT | Performed by: UROLOGY

## 2024-05-07 PROCEDURE — G2211 COMPLEX E/M VISIT ADD ON: HCPCS | Performed by: UROLOGY

## 2024-05-07 PROCEDURE — 51798 US URINE CAPACITY MEASURE: CPT | Performed by: UROLOGY

## 2024-05-07 PROCEDURE — 81002 URINALYSIS NONAUTO W/O SCOPE: CPT | Performed by: UROLOGY

## 2024-05-07 PROCEDURE — 1036F TOBACCO NON-USER: CPT | Performed by: UROLOGY

## 2024-05-07 RX ORDER — TADALAFIL 5 MG/1
5 TABLET ORAL DAILY
Qty: 30 TABLET | Refills: 11 | Status: SHIPPED | OUTPATIENT
Start: 2024-05-07 | End: 2025-05-07

## 2024-05-07 NOTE — PROGRESS NOTES
Subjective   Clifford Recinos is a 58 y.o. male presenting as a new patient today, referred by Dr. Larry due to undetectable PSA. Patient has low energy levels and low libido. His Testosterone is 4.0. He has a history of testicular torsion at age 5. Patient has a family history of prostate cancer. Patient has complaints of bothersome LUTS. He has increased daytime frequency x19, weak urinary stream, sensation of incomplete bladder emptying and nocturia x3. Denies any recent gross hematuria, fevers, chills, urinary retention, intractable flank or abdominal pain, nausea or vomiting.      Past Medical History:   Diagnosis Date    Fracture of fourth toe, left, closed, initial encounter 10/18/2019    Fracture of fourth toe, left, open, with routine healing, subsequent encounter 10/14/2019    Other instability, left knee 05/02/2018    Other instability, left knee    Pain in left knee 05/02/2018    Left knee pain, unspecified chronicity    Pain in thoracic spine 02/21/2022    Thoracic spine pain    Unspecified injury of thorax, initial encounter 02/21/2022    Rib injury    Wedge compression fracture of unspecified thoracic vertebra, initial encounter for closed fracture (Multi) 02/23/2022    Thoracic compression fracture     Past Surgical History:   Procedure Laterality Date    OTHER SURGICAL HISTORY  02/21/2022    Appendectomy    OTHER SURGICAL HISTORY  02/21/2022    Cholecystectomy     Family History   Problem Relation Name Age of Onset    Other (CABG) Mother      Other (cardiac pacemaker) Father      Other (malignant neoplasm of prostate) Father      Other (sepsis) Father      Other (stroke syndrome) Father      Other (PTCA) Sister       Current Outpatient Medications   Medication Sig Dispense Refill    amLODIPine (Norvasc) 5 mg tablet TAKE 1 TABLET BY MOUTH EVERY DAY 90 tablet 1    Blood glucose monitoring meter kit (OneTouch Ultra2 Meter) kit For BG check as needed for hypoglycemia 1 each 0    clonazePAM (KlonoPIN) 1 mg  tablet Take 1 tablet (1 mg) by mouth 2 times a day as needed for anxiety.      dilTIAZem CD (Cardizem CD) 180 mg 24 hr capsule Take 1 capsule (180 mg) by mouth once daily. 30 capsule 11    escitalopram (Lexapro) 10 mg tablet TAKE 1 TABLET BY MOUTH EVERY DAY (Patient taking differently: Take 0.5 tablets (5 mg) by mouth once daily.) 90 tablet 1    fluticasone (Flonase) 50 mcg/actuation nasal spray USE 1 SPRAY EACH NOSTRIL TWICE DAILY - OR - USE 2 SPRAYS EACH NOSTRIL ONCE DAILY. (Patient taking differently: 2 sprays if needed for allergies.) 48 mL 1    lancets (OneTouch UltraSoft Lancets) misc Check BG as needed for hypoglycemia 100 each 2    OneTouch Ultra Test strip Check BG as needed for symptoms of hypoglycemia 100 strip 2    pantoprazole (ProtoNix) 40 mg EC tablet Take 1 tablet (40 mg) by mouth 2 times a day. Do not crush, chew, or split. 60 tablet 0     No current facility-administered medications for this visit.     Allergies   Allergen Reactions    Ketorolac Tromethamine Anaphylaxis    Sudafed [Pseudoephedrine Hcl] Other     hypertension    Cefdinir Diarrhea    Levofloxacin Other     Insomnia, heart pounding, gen. shaking, frequency    Nsaids (Non-Steroidal Anti-Inflammatory Drug) GI bleeding    Oxycodone-Acetaminophen Other     HA, angry    Propoxyphene N-Acetaminophen Other     HA, vision change    Doxycycline Rash     Social History     Socioeconomic History    Marital status:      Spouse name: Not on file    Number of children: Not on file    Years of education: Not on file    Highest education level: Not on file   Occupational History    Not on file   Tobacco Use    Smoking status: Never    Smokeless tobacco: Never   Vaping Use    Vaping status: Never Used   Substance and Sexual Activity    Alcohol use: Yes     Comment: socially    Drug use: Never    Sexual activity: Not on file   Other Topics Concern    Not on file   Social History Narrative    Not on file     Social Determinants of Health      Financial Resource Strain: Low Risk  (4/28/2024)    Overall Financial Resource Strain (CARDIA)     Difficulty of Paying Living Expenses: Not hard at all   Food Insecurity: Not on file   Transportation Needs: No Transportation Needs (4/28/2024)    PRAPARE - Transportation     Lack of Transportation (Medical): No     Lack of Transportation (Non-Medical): No   Physical Activity: Not on file   Stress: Not on file   Social Connections: Not on file   Intimate Partner Violence: Not on file   Housing Stability: Low Risk  (4/28/2024)    Housing Stability Vital Sign     Unable to Pay for Housing in the Last Year: No     Number of Places Lived in the Last Year: 1     Unstable Housing in the Last Year: No       Review of Systems  Pertinent items are noted in HPI.    Objective     Lab Review  Lab Results   Component Value Date    WBC 3.4 (L) 04/29/2024    RBC 2.71 (L) 04/29/2024    HGB 8.9 (L) 04/29/2024    HCT 28.1 (L) 04/29/2024     04/29/2024      Lab Results   Component Value Date    BUN 9 04/29/2024    CREATININE 0.60 04/29/2024   PVR 0ml    IPSS 19 and 3   Uroflow study demonstrated voided volume of 79 and maximal flow rate of 13 ml/s.   Urine analysis shows negative     Assessment/Plan   Diagnoses and all orders for this visit:  Nocturia associated with benign prostatic hyperplasia  Abnormal PSA  -     POCT UA (nonautomated) manually resulted  -     Measure post void residual  -     MR prostate with jacoby boundaries; Future  BPH with obstruction/lower urinary tract symptoms  -     MR prostate with jacoby boundaries; Future  -     Urine flow measurement  Undetectable PSA   Family history of prostate cancer    We will obtain a prostate MRI and follow up virtually to review.       BPH with LUTS     We will start patient on daily Cialis 5mg.     The patient understands that these medications are not initiators of erection and that he will still require sexual stimulation. If prescribed vardenafil or sildenafil, he was  advised to take the medication 30-60 minutes prior to sexual activity and that the efficacy of the medication is decreased following a high-fat meal.     The patient verbalized understanding that nitrates such as nitroglycerine, isosorbide mononitrate, isosorbide dinitrate and any other nitrate preparations are absolutely contradicted with the use of a PDE-5 inhibitor. The patient was advised to alert any medical personal of PDE- 5 inhibitor use should he seek urgent medical attention for any reason.     I explained the common adverse effects of therapy including but not limited to headache, flushing, dizziness, rash, upset stomach, diarrhea, nasal congestion, abnormal vision, back pain, and myalgia. Less common side effects are lightheadedness or blood pressure drop upon standing. We discussed that patients have  after using medications in this class, and sometimes the exact cause of death was not able to be determined. There is a very small risk of nonarteritic ischemic optic neuropathy, a rare cause of blindness that may be permanent while using medications in this class. Patient is instructed to immediately stop this medication and seek medical attention if he develops visual disturbances in one or both eyes.     We discussed that if he experiences erection lasting longer than four hours, he needs to seek immediate treatment at the ER as the longer he waits, the more damage is done to the penile tissue. The patient states that he is not withholding any information about his condition or the use of medications in order to receive a PDE5 inhibitor class medication. No barriers to learning were identified. After all of the patients' questions were satisfactorily answered, he expressed understanding the risks of therapy and wishes to proceed.       All questions were answered to the patient's satisfaction. Patient agrees with the plan and wishes to proceed. Follow-up will be scheduled appropriately.     E&M  visit today is associated with current or anticipated ongoing medical care services related to a patient's single, serious condition or a complex condition.    Scribed for Dr. Langley by Portia Virk. I , Dr Langley, have personally reviewed and agreed with the information entered by the Virtual Scribe.

## 2024-05-09 ENCOUNTER — OFFICE VISIT (OUTPATIENT)
Dept: CARDIOLOGY | Facility: HOSPITAL | Age: 59
End: 2024-05-09
Payer: COMMERCIAL

## 2024-05-09 VITALS
HEART RATE: 50 BPM | SYSTOLIC BLOOD PRESSURE: 170 MMHG | BODY MASS INDEX: 31.63 KG/M2 | OXYGEN SATURATION: 97 % | WEIGHT: 233.25 LBS | DIASTOLIC BLOOD PRESSURE: 77 MMHG

## 2024-05-09 DIAGNOSIS — I10 ESSENTIAL (PRIMARY) HYPERTENSION: ICD-10-CM

## 2024-05-09 DIAGNOSIS — R00.2 PALPITATIONS: Primary | ICD-10-CM

## 2024-05-09 PROCEDURE — 99214 OFFICE O/P EST MOD 30 MIN: CPT | Performed by: NURSE PRACTITIONER

## 2024-05-09 PROCEDURE — 1036F TOBACCO NON-USER: CPT | Performed by: NURSE PRACTITIONER

## 2024-05-09 PROCEDURE — 3078F DIAST BP <80 MM HG: CPT | Performed by: NURSE PRACTITIONER

## 2024-05-09 PROCEDURE — 3077F SYST BP >= 140 MM HG: CPT | Performed by: NURSE PRACTITIONER

## 2024-05-09 RX ORDER — AMLODIPINE BESYLATE 5 MG/1
5 TABLET ORAL DAILY
Qty: 30 TABLET | Refills: 11 | Status: SHIPPED | OUTPATIENT
Start: 2024-05-09 | End: 2025-05-09

## 2024-05-09 RX ORDER — ASPIRIN 81 MG/1
81 TABLET ORAL DAILY
COMMUNITY
End: 2024-05-23 | Stop reason: ALTCHOICE

## 2024-05-09 RX ORDER — AMLODIPINE BESYLATE 5 MG/1
5 TABLET ORAL DAILY
Start: 2024-05-09 | End: 2024-05-09 | Stop reason: SDUPTHER

## 2024-05-09 NOTE — PROGRESS NOTES
Chief Complaint:   Follow up      History Of Present Illness:    Clifford Recinos is a 58 y.o. male with h/o htn, PVCs, depression, and BPH recently started on tidalafil presenting today for follow up s/p recent hospitalization.  He was admitted 4/28/2024-4/29/2024 s/p syncopal event while toileting.  Patient was found by his wife-- was not responding and reported to have left facial droop.  Upon awakening had left arm weakness.  Stroke work up including MRI/MRA brain was negative for acute stroke.  He also reported having melena stools with acute on chronic anemia noted (Hg 8.9, BL 10-11).  Was evaluated by GI during admit and recommended outpatient EGD/colonoscopy-- currently scheduled on 6/10/2024.  In the beginning of February, amlodipine was switched to diltiazem for PVC burden reduction.  Patient reports he took diltiazem for nearly 1 month and experienced symptoms of LE edema, right flank pain and worsening constipation so he stopped taking the medication.  Palpitations did improve on diltiazem.  He feels generally tired, feels fatigued and becomes SOB at times with exertion.  Denies chest pain or pressure.  He also states that his HR is typically low in the 40-50's at rest.  Denies dizziness.      Last Recorded Vitals:  Vitals:    05/09/24 1316   BP: 170/77   Pulse: 50   SpO2: 97%   Weight: 106 kg (233 lb 4 oz)       Past Medical History:  He has a past medical history of Fracture of fourth toe, left, closed, initial encounter (10/18/2019), Fracture of fourth toe, left, open, with routine healing, subsequent encounter (10/14/2019), Other instability, left knee (05/02/2018), Pain in left knee (05/02/2018), Pain in thoracic spine (02/21/2022), Unspecified injury of thorax, initial encounter (02/21/2022), and Wedge compression fracture of unspecified thoracic vertebra, initial encounter for closed fracture (Multi) (02/23/2022).    Past Surgical History:  He has a past surgical history that includes Other surgical  history (02/21/2022) and Other surgical history (02/21/2022).      Social History:  He reports that he has never smoked. He has never used smokeless tobacco. He reports current alcohol use. He reports that he does not use drugs.    Family History:  Family History   Problem Relation Name Age of Onset    Other (CABG) Mother      Other (cardiac pacemaker) Father      Other (malignant neoplasm of prostate) Father      Other (sepsis) Father      Other (stroke syndrome) Father      Other (PTCA) Sister          Allergies:  Ketorolac tromethamine, Sudafed [pseudoephedrine hcl], Cefdinir, Levofloxacin, Nsaids (non-steroidal anti-inflammatory drug), Oxycodone-acetaminophen, Propoxyphene n-acetaminophen, and Doxycycline    Outpatient Medications:  Current Outpatient Medications   Medication Instructions    amLODIPine (NORVASC) 5 mg, oral, Daily    aspirin 81 mg, oral, Daily    Blood glucose monitoring meter kit (OneTouch Ultra2 Meter) kit For BG check as needed for hypoglycemia    clonazePAM (KLONOPIN) 1 mg, oral, 2 times daily PRN    escitalopram (LEXAPRO) 10 mg, oral, Daily    fluticasone (Flonase) 50 mcg/actuation nasal spray USE 1 SPRAY EACH NOSTRIL TWICE DAILY - OR - USE 2 SPRAYS EACH NOSTRIL ONCE DAILY.    lancets (OneTouch UltraSoft Lancets) misc Check BG as needed for hypoglycemia    OneTouch Ultra Test strip Check BG as needed for symptoms of hypoglycemia    pantoprazole (PROTONIX) 40 mg, oral, 2 times daily, Do not crush, chew, or split.    tadalafil (CIALIS) 5 mg, oral, Daily       Physical Exam:  Constitutional: Pleasant, Awake/Alert/Oriented to person place and time. No distress  Head: Atraumatic, Normocephalic  Eyes: EOMI. SUKH  Neck: No JVD  Cardiovascular: Regular rate and rhythm, S1, S2. No extra heart sounds or murmurs  Respiratory: Clear to auscultation bilaterally. No wheezing, rales or rhonchi. Good chest wall expansion  Abdomen: Soft, Nontender. Bowel sounds appreciated  Musculoskeletal: ROM intact.  Muscle strength grossly intact upper and lower extremities 5/5.   Neurological: CNII-XII intact. Sensation grossly intact  Extremities: Warm and dry. No acute rashes and lesions  Psychiatric: Appropriate mood and affect       Last Labs:  CBC -  Lab Results   Component Value Date    WBC 3.4 (L) 04/29/2024    HGB 8.9 (L) 04/29/2024    HCT 28.1 (L) 04/29/2024     (H) 04/29/2024     04/29/2024       CMP -  Lab Results   Component Value Date    CALCIUM 9.3 04/29/2024    PHOS 3.9 04/29/2024    PROT 7.3 04/28/2024    ALBUMIN 4.4 04/29/2024    AST 13 04/28/2024    ALT 15 04/28/2024    ALKPHOS 43 04/28/2024    BILITOT 0.8 04/28/2024       LIPID PANEL -   Lab Results   Component Value Date    CHOL 128 04/28/2024    TRIG 71 04/28/2024    HDL 43.4 04/28/2024    CHHDL 2.9 04/28/2024    LDLF 100 (H) 04/21/2022    VLDL 14 04/28/2024    NHDL 85 04/28/2024       RENAL FUNCTION PANEL -   Lab Results   Component Value Date    GLUCOSE 122 (H) 04/29/2024     04/29/2024    K 3.8 04/29/2024     04/29/2024    CO2 25 04/29/2024    ANIONGAP 13 04/29/2024    BUN 9 04/29/2024    CREATININE 0.60 04/29/2024    GFRMALE >90 01/09/2023    CALCIUM 9.3 04/29/2024    PHOS 3.9 04/29/2024    ALBUMIN 4.4 04/29/2024        Lab Results   Component Value Date     (H) 04/28/2024    HGBA1C 4.8 04/28/2024       Last Cardiology Tests:  ECG:  ECG 12 lead 04/28/2024    Sinus bradycardia, HR 57bpm     Echo:  Transthoracic Echo (TTE) Complete 04/29/2024    CONCLUSIONS:   1. Left ventricular systolic function is normal with a 60-65% estimated ejection fraction.   2. The left atrium is moderate to severely dilated.   3. There is mild mitral and tricuspid regurgitation.   4. There is no evidence of a patent foramen ovale.   5. The estimated pulmonary artery pressure is mildly elevated with the RVSP at 48.7 mmHg.    2/1/2024  CONCLUSIONS:   1. Left ventricular systolic function is normal with a 60% estimated ejection fraction.   2.  There is mild mitral regurgitation.    Stress Test:  Stress Test 02/01/2024    Summary:   1. Frequent PVCs and Bigeminies with stress.   2. Abnormal Stress Test.   3. Adequate level of stress achieved.       Cardiac Imaging:  CT HEART CALCIUM SCORING WO IV CONTRAST 06/09/2022    FINDINGS:  The score and distribution of calcium in the coronary arteries is as  follows:     LM             0  LAD           0  LCx            0  RCA           0     Total         0     The visualized mid/lower ascending thoracic aorta measures 4.1 cm in  diameter. The heart is normal in size. No pericardial effusion is  present.     No gross evidence of mediastinal or hilar lymphadenopathy or masses  is identified. The visualized segments of the lungs are normally  expanded.     The visualized subdiaphragmatic structures appear intact.    Lab review: I have personally reviewed the laboratory result(s)   Diagnostic review: I have personally reviewed the result(s) of the Echocardiogram, CCS, and stress test     Assessment/Plan   Very pleasant 58 y.o. male with h/o htn, PVCs, depression, and BPH recently started on tidalafil presenting today for follow up s/p recent hospitalization after suspected vasovagal syncope-- had concerning symptoms for stroke prior to the ER, however, stroke work up during admit was negative.  He was found to be anemic with reported melena stools-- scheduled for EGD/colonoscopy 6/10/2024.  Did not tolerate diltiazem which he was started on in February for PVC burden reduction-- reports side effects of LE edema, right flank pain and constipation.  LE edema and right flank pain stopped after stopping diltiazem.     Plan:  Discussed starting Toprol XL 50mg daily for PVC burden reduction and antihypertensive therapy.  Patient and his wife are concerned as he HR reportedly runs 40-50's at baseline the majority of the time.  Bradycardia may actually be related to high PVC burden.  Recommend further ambulatory cardiac  monitoring for assessment of PVC burden as well as possible bradyarrhythmia.  Consider starting Toprol XL pending results.     Resume amlodipine 5mg daily in the interim as he was taking previously and tolerating well for BP control.     May proceed with EGD/colonoscopy with low risk for perioperative cardiovascular events.     Follow up in 6 weeks to discuss zio results.     Mary Quinteros, HUMBERTO-CNP

## 2024-05-13 DIAGNOSIS — F41.9 ANXIETY AND DEPRESSION: ICD-10-CM

## 2024-05-13 DIAGNOSIS — F32.A ANXIETY AND DEPRESSION: ICD-10-CM

## 2024-05-15 ENCOUNTER — PATIENT OUTREACH (OUTPATIENT)
Dept: CARE COORDINATION | Facility: CLINIC | Age: 59
End: 2024-05-15
Payer: COMMERCIAL

## 2024-05-15 RX ORDER — ESCITALOPRAM OXALATE 10 MG/1
5 TABLET ORAL DAILY
Qty: 45 TABLET | Refills: 1 | Status: SHIPPED | OUTPATIENT
Start: 2024-05-15 | End: 2024-05-24 | Stop reason: ALTCHOICE

## 2024-05-15 NOTE — PROGRESS NOTES
Unable to reach patient for call back after patient's follow up appointment with PCP.   LVM with call back number for patient to call if needed   If no voicemail available call attempts x 2 were made to contact the patient to assist with any questions or concerns patient may have.    Jose Carl LPN

## 2024-05-21 NOTE — PROGRESS NOTES
[facility]  New Visit Note        HISTORY OF PRESENT ILLNESS     The patient's referring provider is: Apolonia Larry MD    HISTORY OF PRESENT ILLNESS   Clifford Recinos is a 58 y.o. male who presents to a [facility] for a sleep medicine evaluation with concerns of Consult, Snoring, Unrefreshing Sleep, Difficulties Staying Asleep, Nocturia, and Restless Legs.     PAST SLEEP HISTORY    Patient has the following sleep-related diagnoses and sleep study results: had SS in 2010 - results inconclusive    CURRENT HISTORY    On today's visit, the patient reports symptoms of poor sleep, frequent waking, moving legs a lot at night.    He denies snoring but does report  witnessed apnea (on his back),  and un refreshing sleep. He also has frequent waking. He notes these symptoms aren't as bad when his weight is lower.    His 2010 study was done for sx for daytime sleepiness and night terrors. He would pound bed or act out dreams at that time but no longer does this.    No longer taking clonazepam (for anxiety)  Still takes lexapro - doesn't think helping  Used to take zoloft but his legs hurt with generic zoloft - has similar issue with lexapro.    Moves legs a lot at night but he denies urge to move legs in evening. He does kick and move legs a lot per wife.    On medication for HTN.    No fhx of RADHA    NECK 16.25    STOP  3 TOP  BANG 2 AG    Sleep schedule  on weekdays / work days:  Usual Bedtime  10 p  Falls asleep around  10 p  Wake time  6 a    Sleep schedule  on weekends/non work days :  same    Naps:   20-30 minutes afternoon    Average sleep duration 8 hours/day    Preferred sleeping position: all are okay    Sleep-related ROS:    Sleep Initiation: no problems going to sleep    Sleep Maintenance: wakes often    Recreational drug use  Smoking: never  Alcohol consumption: never  Caffeine consumption:  2/day  Marijuana: never    ESS: 8      PHYSICAL EXAM      VITAL SIGNS: /77   Pulse 58   Ht 1.829 m (6')   Wt 107 kg (236 lb)   SpO2 97%   BMI 32.01 kg/m²      CURRENT WEIGHT: [unfilled]  BMI: [unfilled]  PREVIOUS WEIGHTS:  Wt Readings from Last 3 Encounters:   05/23/24 107 kg (236 lb)   05/09/24 106 kg (233 lb 4 oz)   04/28/24 104 kg (229 lb)       PHYSICAL EXAM: GENERAL: alert oriented x 3 pleasant and cooperative no acute distress  MODIFIED MALLAMPATI SCORE: 2+  LATERAL PHARYNGEAL WALL: 2+  NECK EXAM: normal supple no adenopathy    RESULTS/DATA     Iron (ug/dL)   Date Value   12/28/2023 118   07/03/2023 143   04/21/2022 106     % Saturation (%)   Date Value   12/28/2023 36     Iron Saturation (%)   Date Value   07/03/2023 48 (H)   04/21/2022 30     TIBC (ug/dL)   Date Value   12/28/2023 325   07/03/2023 301   04/21/2022 353     Ferritin   Date Value   12/28/2023 236 ng/mL   07/03/2023 264 ug/L   04/21/2022 186 ug/L       ASSESSMENT/PLAN     Mr. Recinos is a 58 y.o. male and was referred to the [facility] for the following issues:    OBSTRUCTIVE SLEEP APNEA / SLEEPINESS/ FREQUENT WAKING  -Ordering sleep study to evaluate    BMI>31  -Body mass index is 32.01 kg/m².  today  -With sufficient weight loss may no longer require treatment for RADHA    HYPERTENSION  BP Readings from Last 3 Encounters:   05/24/24 152/54   05/23/24 146/77   05/09/24 170/77      -Well controlled with current medications     Nocturnal leg mvmts  -RLS unlikely based on symptoms  -limb movement possibly secondary to untreated RADHA    Followup 3 weeks after sleep study to review results

## 2024-05-22 PROBLEM — R68.82 REDUCED LIBIDO: Status: ACTIVE | Noted: 2024-04-12

## 2024-05-22 PROBLEM — J01.40 ACUTE NON-RECURRENT PANSINUSITIS: Status: RESOLVED | Noted: 2024-02-06 | Resolved: 2024-05-22

## 2024-05-22 PROBLEM — I49.3 MULTIPLE PREMATURE VENTRICULAR COMPLEXES: Status: ACTIVE | Noted: 2024-05-22

## 2024-05-22 PROBLEM — R00.2 PALPITATIONS: Status: ACTIVE | Noted: 2024-05-22

## 2024-05-22 PROBLEM — R53.83 FATIGUE: Status: ACTIVE | Noted: 2024-04-12

## 2024-05-22 PROBLEM — E16.2 HYPOGLYCEMIA: Status: ACTIVE | Noted: 2024-04-12

## 2024-05-22 PROBLEM — I49.9 CARDIAC ARRHYTHMIA: Status: ACTIVE | Noted: 2024-05-22

## 2024-05-22 RX ORDER — ATORVASTATIN CALCIUM 80 MG/1
80 TABLET, FILM COATED ORAL DAILY
COMMUNITY
Start: 2024-04-28 | End: 2024-05-23 | Stop reason: ALTCHOICE

## 2024-05-22 RX ORDER — LANCETS 30 GAUGE
EACH MISCELLANEOUS
COMMUNITY
Start: 2024-04-15

## 2024-05-22 RX ORDER — POLYETHYLENE GLYCOL-3350 AND ELECTROLYTES 236; 6.74; 5.86; 2.97; 22.74 G/274.31G; G/274.31G; G/274.31G; G/274.31G; G/274.31G
POWDER, FOR SOLUTION ORAL
COMMUNITY
Start: 2024-05-05 | End: 2024-05-24 | Stop reason: ALTCHOICE

## 2024-05-23 ENCOUNTER — OFFICE VISIT (OUTPATIENT)
Dept: SLEEP MEDICINE | Facility: CLINIC | Age: 59
End: 2024-05-23
Payer: COMMERCIAL

## 2024-05-23 ENCOUNTER — HOSPITAL ENCOUNTER (OUTPATIENT)
Dept: CARDIOLOGY | Facility: HOSPITAL | Age: 59
Discharge: HOME | End: 2024-05-23
Payer: COMMERCIAL

## 2024-05-23 ENCOUNTER — HOSPITAL ENCOUNTER (OUTPATIENT)
Dept: RADIOLOGY | Facility: HOSPITAL | Age: 59
Discharge: HOME | End: 2024-05-23
Payer: COMMERCIAL

## 2024-05-23 VITALS
WEIGHT: 236 LBS | OXYGEN SATURATION: 97 % | HEIGHT: 72 IN | SYSTOLIC BLOOD PRESSURE: 146 MMHG | HEART RATE: 58 BPM | BODY MASS INDEX: 31.97 KG/M2 | DIASTOLIC BLOOD PRESSURE: 77 MMHG

## 2024-05-23 DIAGNOSIS — R00.2 PALPITATIONS: ICD-10-CM

## 2024-05-23 DIAGNOSIS — E29.1 HYPOGONADISM IN MALE: ICD-10-CM

## 2024-05-23 DIAGNOSIS — N51 DISORDERS OF MALE GENITAL ORGANS IN DISEASES CLASSIFIED ELSEWHERE: ICD-10-CM

## 2024-05-23 DIAGNOSIS — G47.00 PERSISTENT DISORDER OF INITIATING OR MAINTAINING SLEEP: ICD-10-CM

## 2024-05-23 DIAGNOSIS — G47.19 EXCESSIVE DAYTIME SLEEPINESS: ICD-10-CM

## 2024-05-23 DIAGNOSIS — R25.8 NOCTURNAL LEG MOVEMENTS: ICD-10-CM

## 2024-05-23 DIAGNOSIS — G47.33 OSA (OBSTRUCTIVE SLEEP APNEA): Primary | ICD-10-CM

## 2024-05-23 PROCEDURE — 1036F TOBACCO NON-USER: CPT | Performed by: PHYSICIAN ASSISTANT

## 2024-05-23 PROCEDURE — 93246 EXT ECG>7D<15D RECORDING: CPT

## 2024-05-23 PROCEDURE — 3077F SYST BP >= 140 MM HG: CPT | Performed by: PHYSICIAN ASSISTANT

## 2024-05-23 PROCEDURE — 76870 US EXAM SCROTUM: CPT | Performed by: STUDENT IN AN ORGANIZED HEALTH CARE EDUCATION/TRAINING PROGRAM

## 2024-05-23 PROCEDURE — 99204 OFFICE O/P NEW MOD 45 MIN: CPT | Performed by: PHYSICIAN ASSISTANT

## 2024-05-23 PROCEDURE — 3078F DIAST BP <80 MM HG: CPT | Performed by: PHYSICIAN ASSISTANT

## 2024-05-23 PROCEDURE — 93975 VASCULAR STUDY: CPT

## 2024-05-23 PROCEDURE — 99214 OFFICE O/P EST MOD 30 MIN: CPT | Mod: 25 | Performed by: PHYSICIAN ASSISTANT

## 2024-05-23 ASSESSMENT — LIFESTYLE VARIABLES
HOW OFTEN DO YOU HAVE SIX OR MORE DRINKS ON ONE OCCASION: NEVER
HOW MANY STANDARD DRINKS CONTAINING ALCOHOL DO YOU HAVE ON A TYPICAL DAY: 1 OR 2
SKIP TO QUESTIONS 9-10: 1
AUDIT-C TOTAL SCORE: 1
HOW OFTEN DO YOU HAVE A DRINK CONTAINING ALCOHOL: MONTHLY OR LESS

## 2024-05-23 ASSESSMENT — SLEEP AND FATIGUE QUESTIONNAIRES
HOW LIKELY ARE YOU TO NOD OFF OR FALL ASLEEP WHILE WATCHING TV: SLIGHT CHANCE OF DOZING
HOW LIKELY ARE YOU TO NOD OFF OR FALL ASLEEP WHILE SITTING AND TALKING TO SOMEONE: WOULD NEVER DOZE
SITING INACTIVE IN A PUBLIC PLACE LIKE A CLASS ROOM OR A MOVIE THEATER: WOULD NEVER DOZE
HOW LIKELY ARE YOU TO NOD OFF OR FALL ASLEEP WHILE LYING DOWN TO REST IN THE AFTERNOON WHEN CIRCUMSTANCES PERMIT: MODERATE CHANCE OF DOZING
HOW LIKELY ARE YOU TO NOD OFF OR FALL ASLEEP WHILE SITTING AND READING: SLIGHT CHANCE OF DOZING
HOW LIKELY ARE YOU TO NOD OFF OR FALL ASLEEP IN A CAR, WHILE STOPPED FOR A FEW MINUTES IN TRAFFIC: WOULD NEVER DOZE
ESS-CHAD TOTAL SCORE: 8
HOW LIKELY ARE YOU TO NOD OFF OR FALL ASLEEP WHEN YOU ARE A PASSENGER IN A CAR FOR AN HOUR WITHOUT A BREAK: SLIGHT CHANCE OF DOZING
HOW LIKELY ARE YOU TO NOD OFF OR FALL ASLEEP WHILE SITTING QUIETLY AFTER LUNCH WITHOUT ALCOHOL: HIGH CHANCE OF DOZING

## 2024-05-23 ASSESSMENT — PAIN SCALES - GENERAL: PAINLEVEL: 3

## 2024-05-23 ASSESSMENT — PATIENT HEALTH QUESTIONNAIRE - PHQ9
SUM OF ALL RESPONSES TO PHQ9 QUESTIONS 1 & 2: 2
2. FEELING DOWN, DEPRESSED OR HOPELESS: SEVERAL DAYS
10. IF YOU CHECKED OFF ANY PROBLEMS, HOW DIFFICULT HAVE THESE PROBLEMS MADE IT FOR YOU TO DO YOUR WORK, TAKE CARE OF THINGS AT HOME, OR GET ALONG WITH OTHER PEOPLE: SOMEWHAT DIFFICULT
1. LITTLE INTEREST OR PLEASURE IN DOING THINGS: SEVERAL DAYS

## 2024-05-23 NOTE — PATIENT INSTRUCTIONS
Thank you for coming to the Sleep Medicine Clinic today! Your sleep medicine provider today was: Israel Washington PA-C Below is a summary of your treatment plan, other important information, and our contact numbers:      TREATMENT PLAN     Call [phone]), option 3 to schedule your sleep study. When you have an appointment please call us back at [phone] to schedule a followup appointment 3-4 weeks after to review results.    Obstructive Sleep Apnea (RADHA) is a sleep disorder where your upper airway muscles relax during sleep and the airway intermittently and repetitively narrows and collapses leading to partially blocked airway (hypopnea) or completely blocked airway (apnea) which, in turn, can disrupt breathing in sleep, lower oxygen levels while you sleep and cause night time wakings. Because both apnea and hypopnea may cause higher carbon dioxide or low oxygen levels, untreated RADHA can lead to heart arrhythmia, elevation of blood pressure, and make it harder for the body to consolidate memory and facilitate metabolism (leading to higher blood sugars at night). Frequent partial arousals occur during sleep resulting in sleep deprivation and daytime sleepiness. RADHA is associated with an increased risk of cardiovascular disease, stroke, hypertension, and insulin resistance. Moreover, untreated RADHA with excessive daytime sleepiness can increase the risk of motor vehicular accidents.    Some conservative strategies for RADHA regardless of RADHA severity are:   Positional therapy - Avoid sleeping on your back.   Healthy diet and regular exercise to optimize weight is highly encouraged.   Avoid alcohol late in the evening and sedative-hypnotics as these substances can make sleep apnea worse.   Improve breathing through the nose with intranasal steroid spray, saline rinse, or antihistamines    Safety: Avoid driving vehicle and operating heavy equipment while sleepy. Drowsy driving may lead to life-threatening  motor vehicle accidents. A person driving while sleepy is 5 times more likely to have an accident. If you feel sleepy, pull over and take a short power nap (sleep for less than 30 minutes). Otherwise, ask somebody to drive you.    Treatment options for sleep apnea include weight management, positional therapy, Positive Airway Therapy (PAP) therapy, oral appliance therapy, hypoglossal nerve stimulator (Inspire) and select airway surgeries.      OUR SLEEP TESTING LOCATIONS     Our team will contact you to schedule your sleep study, however, you can contact us as follow:  Main Phone Line (scheduling only): [phone]), option 3  Adult and Pediatric Locations  [facility] (6 years and older): [facility] - 4th floor ([address]) After hours line: [phone]  [facility] (Main campus: All ages): [facility], 6th floor. After hours line: [phone]   [place] (18 years and older): [address] [facility] [address] [facility], 2nd floor   [place] (18 years and older): [address]; 4th floor  After hours line: [phone]  [facility] (18 years and older) at [place]: [address] [facility]  After hours line: [phone]    [place] (5 years and older; younger considered on case-by-case basis): [address]; [address], [address]. Scheduling  After hours line: [phone]   [place] (6 years and older): [address]; [address]   [place] (6 years and older): [address] [facility] [address] [facility], [address]  After hours line: [phone]   [place] (13 years and older) in [place]: [address], 2nd floor  After hours line: [phone]   [place] (13 year and older): [address]  After hours line: [phone]      IMPORTANT PHONE NUMBERS     Sleep Medicine Clinic Fax: [phone]  Appointments (for Adult Sleep Clinic): [phone] - option 2  Appointments (For Sleep Studies):  [phone] - option 3  Behavioral Sleep Medicine: [phone]    [employer]): [phone]  For clinical questions and refilling prescriptions: [phone]  [place] (For [place]/[facility]): P: [phone]  F: [phone]       CONTACTING YOUR SLEEP MEDICINE PROVIDER     Send a message directly to your provider through \"My Chart\", which is the email service through your  Records Account: [email or web address]   Call [phone] and leave a message. One of the administrative assistants will forward the message to your sleep medicine provider through \"My Chart\" and/or email.     Your sleep medicine provider for this visit was: Israel Washington PA-C  "

## 2024-05-24 ENCOUNTER — OFFICE VISIT (OUTPATIENT)
Dept: PRIMARY CARE | Facility: CLINIC | Age: 59
End: 2024-05-24
Payer: COMMERCIAL

## 2024-05-24 VITALS
BODY MASS INDEX: 31.9 KG/M2 | HEIGHT: 72 IN | OXYGEN SATURATION: 96 % | HEART RATE: 74 BPM | SYSTOLIC BLOOD PRESSURE: 152 MMHG | DIASTOLIC BLOOD PRESSURE: 54 MMHG | WEIGHT: 235.5 LBS

## 2024-05-24 DIAGNOSIS — F41.9 ANXIETY AND DEPRESSION: Primary | ICD-10-CM

## 2024-05-24 DIAGNOSIS — D64.9 ANEMIA, UNSPECIFIED TYPE: ICD-10-CM

## 2024-05-24 DIAGNOSIS — R29.90 STROKE-LIKE SYMPTOM: ICD-10-CM

## 2024-05-24 DIAGNOSIS — I10 ESSENTIAL HYPERTENSION: ICD-10-CM

## 2024-05-24 DIAGNOSIS — F32.A ANXIETY AND DEPRESSION: Primary | ICD-10-CM

## 2024-05-24 PROCEDURE — 1036F TOBACCO NON-USER: CPT | Performed by: FAMILY MEDICINE

## 2024-05-24 PROCEDURE — 3077F SYST BP >= 140 MM HG: CPT | Performed by: FAMILY MEDICINE

## 2024-05-24 PROCEDURE — 3078F DIAST BP <80 MM HG: CPT | Performed by: FAMILY MEDICINE

## 2024-05-24 PROCEDURE — 99214 OFFICE O/P EST MOD 30 MIN: CPT | Performed by: FAMILY MEDICINE

## 2024-05-24 RX ORDER — BUPROPION HYDROCHLORIDE 150 MG/1
150 TABLET ORAL EVERY MORNING
Qty: 30 TABLET | Refills: 2 | Status: SHIPPED | OUTPATIENT
Start: 2024-05-24

## 2024-05-24 ASSESSMENT — ENCOUNTER SYMPTOMS
DIZZINESS: 1
DYSPHORIC MOOD: 1
FEVER: 0
ARTHRALGIAS: 1
NECK PAIN: 1
BACK PAIN: 1
JOINT SWELLING: 0
PALPITATIONS: 1
SLEEP DISTURBANCE: 1
SHORTNESS OF BREATH: 1
ABDOMINAL PAIN: 1
MYALGIAS: 1
VOMITING: 0
DIAPHORESIS: 0
DIARRHEA: 1
ENDOCRINE COMMENTS: DECREASED LIBIDO
NUMBNESS: 1
LIGHT-HEADEDNESS: 1
BLOOD IN STOOL: 1
NAUSEA: 0
WEAKNESS: 1
CHILLS: 0

## 2024-05-24 NOTE — ASSESSMENT & PLAN NOTE
Reports BP low of 100 systolic. No addition or increase of antihypertensives, for now. Keep appointment with cardiology.

## 2024-05-24 NOTE — PATIENT INSTRUCTIONS
Please return for a(n) depression, medication follow-up appointment in 2-3 months, earlier if any question or concern.    Avoid taking Biotin (a vitamin, shows up particularly in hair/nail supplements) for a week prior to any blood tests, as it can interfere with certain results. Fasting for labs means 12 hours, nothing to eat or drink, except water and medications, unless directed otherwise.    For assistance with scheduling referrals or consultations, please call [phone]. For laboratory, radiology, and other tests, please call [phone] ([phone] for pediatrics). Please review prescription inserts and published information for possible adverse effects of all medications. Return after testing or consultation to review results and recommendations, if symptoms persist, change, worsen, or return, or if you have any question or concern. If you do not get results within 7-10 days, or you have any question or concern, please send a message, call the office ([phone]), or return to the office for a follow-up appointment. For non-emergencies, you may call the office. For emergencies, call 9-1-1 or go to the nearest Emergency Department. Please schedule additional appointment(s) to address concern(s) not addressed today.    In general, results are not released or discussed over the telephone, but at an appointment or via  Parents Journey. Results of tests done through [facility] are released via  Parents Journey (see below).  [email or web address]   Parents Journey support line: [phone]

## 2024-05-24 NOTE — ASSESSMENT & PLAN NOTE
Try Wellbutrin. Cautioned regarding anxiety. May need to return to Lexapro. Suspect decreased libido and erectile dysfunction related to hypogonadism, though certainly Lexapro could be playing a role. Return 2-3 months.

## 2024-05-24 NOTE — H&P (VIEW-ONLY)
"Subjective   Patient ID: Clifford Recinos is a 58 y.o. male h/o depression, HTN, hypogonadism who presents for Follow-up (Follow up hospital stay possible TIA R, getting stronger, no concerns, no rx's needed. BL).  HPI Historian(s): Self    Feeling back to baseline compared to before this recent episode. Notes that, since this episode, he is no longer having the sensation of \"being pulled by a string\" or imbalance.    Is checking BP at home, numbers \"are all over.\" As low as 100/50 with HR 40. Reports averages 135-142/60s, HR 53 average.    Would like to try something other than Lexapro. Did not tolerate generic Sertraline due to leg pain. Not having erections. Libido is absent. Has not noted any improvement in these symptoms when he has run out of Lexapro for 3-4d.  Has been taking SSRI for many years.    Review of Systems   Constitutional:  Negative for chills, diaphoresis and fever.   Respiratory:  Positive for shortness of breath (sometimes).    Cardiovascular:  Positive for chest pain, palpitations and leg swelling.   Gastrointestinal:  Positive for abdominal pain (minimal), blood in stool (but not recently) and diarrhea. Negative for nausea and vomiting.   Endocrine:        Decreased libido   Genitourinary:         Erectile dysfunction   Musculoskeletal:  Positive for arthralgias, back pain, gait problem, myalgias and neck pain. Negative for joint swelling.   Neurological:  Positive for dizziness, syncope, weakness, light-headedness and numbness.   Psychiatric/Behavioral:  Positive for dysphoric mood and sleep disturbance.    All other systems reviewed and are negative.        Objective   /54   Pulse 74   Ht 1.829 m (6')   Wt 107 kg (235 lb 8 oz)   SpO2 96%   BMI 31.94 kg/m²     Physical Exam  Vitals and nursing note reviewed.   Constitutional:       General: He is not in acute distress.     Appearance: He is not diaphoretic.      Comments: Cane   HENT:      Head: Normocephalic and atraumatic.   Eyes:    " "  General: No scleral icterus.     Conjunctiva/sclera: Conjunctivae normal.   Cardiovascular:      Rate and Rhythm: Normal rate and regular rhythm.      Heart sounds: Normal heart sounds.   Pulmonary:      Effort: Pulmonary effort is normal.      Breath sounds: Normal breath sounds. No wheezing, rhonchi or rales.   Musculoskeletal:      Right lower leg: Edema (trace pitting) present.      Left lower leg: Edema (trace pitting) present.   Skin:     General: Skin is warm and dry.   Neurological:      General: No focal deficit present.      Mental Status: He is alert. Mental status is at baseline.   Psychiatric:         Mood and Affect: Mood normal.         Thought Content: Thought content normal.         Assessment/Plan   Problem List Items Addressed This Visit       Anemia    Anxiety and depression - Primary    Current Assessment & Plan     Try Wellbutrin. Cautioned regarding anxiety. May need to return to Lexapro. Suspect decreased libido and erectile dysfunction related to hypogonadism, though certainly Lexapro could be playing a role. Return 2-3 months.         Relevant Medications    buPROPion XL (Wellbutrin XL) 150 mg 24 hr tablet    Other Relevant Orders    Follow Up In Primary Care - Established    Essential hypertension    Current Assessment & Plan     Reports BP low of 100 systolic. No addition or increase of antihypertensives, for now. Keep appointment with cardiology.          Other Visit Diagnoses       Stroke-like symptom                Reviewed:, hospital or ER discharge summary, labs ordered by other provider(s), imaging ordered by other provider(s), emergency room report(s), consult report(s), and note(s) from prior unique source             Lab Results   Component Value Date    LDLCALC 70 04/28/2024    LDLCALC 58 12/28/2023    HGBA1C 4.8 04/28/2024    HGBA1C 4.8 04/12/2024    HGBA1C 5.4 12/28/2023    TSH 1.72 04/12/2024    TSH 1.10 12/28/2023    TSH 1.18 04/21/2022      No results found for: \"PSA\"  "

## 2024-05-30 ENCOUNTER — APPOINTMENT (OUTPATIENT)
Dept: GASTROENTEROLOGY | Facility: CLINIC | Age: 59
End: 2024-05-30
Payer: COMMERCIAL

## 2024-06-06 ENCOUNTER — HOSPITAL ENCOUNTER (OUTPATIENT)
Dept: RADIOLOGY | Facility: HOSPITAL | Age: 59
Discharge: HOME | End: 2024-06-06
Payer: COMMERCIAL

## 2024-06-06 ENCOUNTER — TELEPHONE (OUTPATIENT)
Dept: PREADMISSION TESTING | Facility: HOSPITAL | Age: 59
End: 2024-06-06
Payer: COMMERCIAL

## 2024-06-06 DIAGNOSIS — N13.8 BPH WITH OBSTRUCTION/LOWER URINARY TRACT SYMPTOMS: ICD-10-CM

## 2024-06-06 DIAGNOSIS — R97.20 ABNORMAL PSA: ICD-10-CM

## 2024-06-06 DIAGNOSIS — N40.1 BPH WITH OBSTRUCTION/LOWER URINARY TRACT SYMPTOMS: ICD-10-CM

## 2024-06-06 PROCEDURE — 72197 MRI PELVIS W/O & W/DYE: CPT | Performed by: RADIOLOGY

## 2024-06-06 PROCEDURE — 2550000001 HC RX 255 CONTRASTS: Performed by: UROLOGY

## 2024-06-06 PROCEDURE — 72197 MRI PELVIS W/O & W/DYE: CPT

## 2024-06-06 PROCEDURE — A9575 INJ GADOTERATE MEGLUMI 0.1ML: HCPCS | Performed by: UROLOGY

## 2024-06-06 RX ORDER — GADOTERATE MEGLUMINE 376.9 MG/ML
0.2 INJECTION INTRAVENOUS
Status: COMPLETED | OUTPATIENT
Start: 2024-06-06 | End: 2024-06-06

## 2024-06-06 RX ADMIN — GADOTERATE MEGLUMINE 20 ML: 376.9 INJECTION INTRAVENOUS at 12:13

## 2024-06-06 NOTE — TELEPHONE ENCOUNTER
Pre-procedure PAT phone assessment completed. Pre-operative and medication instructions reviewed with patient. Colonoscopy prep reviewed. Patient verbalizes understanding of instructions.  SURGERY PRE-OPERATIVE INSTRUCTIONS    *You will receive a phone call the day before your procedure  after 2pm, (or the Friday before your surgery if scheduled on a Monday.) Generally the hospital will be calling you with this information after that time.    *You are not to eat after midnight the night before the surgery. You may have 8oz of a clear liquid up until 2 hours prior to arriving to the hospital. The exception is with medications you were instructed to take day of surgery.    *You may take tylenol for pain/discomfort as needed.     *Stop taking all aspirin products, ibuprofen (motrin/advil), naproxen (aleve/naprosyn) for one week prior to surgery.    *Stop taking all vitamins and supplements one week prior to surgery.     *You should not have alcoholic beverages for 24 hours before surgery.     *You should not smoke 24 hours prior to surgery.     *To help prevent surgical infections bathe/shower with Dial soap the evening before surgery.    *You can wear deodorant but no lotion, powder, or perfume/cologne. You should remove all make-up and nail polish at home.    *If you wear glasses, please bring a case for the glasses with you.    *You will be asked to remove dentures and contacts.     *Please leave all valuables at home.    *You should wear loose, comfortable clothing that will accommodate bandages and/or casts.    *You should notify your doctor of any change in your condition (fever, cold, rash, etc). Surgery may need to be re-scheduled until a time you are in better health.    *A responsible adult is required to accompany you to and from the hospital if you are receiving anesthesia or a sedative. Patients are not permitted to drive for 24 hours after anesthesia.     *You can use the [employer] parking if you wish.      *If you have any further questions please call [facility] [phone].

## 2024-06-07 NOTE — RESULT ENCOUNTER NOTE
Primary hypogonadism presumably from history of torsion.  Can start replacement after evaluated by sleep medicine. Follow up towards end of the year

## 2024-06-10 ENCOUNTER — ANESTHESIA (OUTPATIENT)
Dept: OPERATING ROOM | Facility: HOSPITAL | Age: 59
End: 2024-06-10
Payer: COMMERCIAL

## 2024-06-10 ENCOUNTER — ANESTHESIA EVENT (OUTPATIENT)
Dept: OPERATING ROOM | Facility: HOSPITAL | Age: 59
End: 2024-06-10
Payer: COMMERCIAL

## 2024-06-10 ENCOUNTER — HOSPITAL ENCOUNTER (OUTPATIENT)
Dept: OPERATING ROOM | Facility: HOSPITAL | Age: 59
Setting detail: OUTPATIENT SURGERY
Discharge: HOME | End: 2024-06-10
Payer: COMMERCIAL

## 2024-06-10 VITALS
RESPIRATION RATE: 16 BRPM | OXYGEN SATURATION: 98 % | HEART RATE: 58 BPM | SYSTOLIC BLOOD PRESSURE: 140 MMHG | HEIGHT: 71 IN | TEMPERATURE: 98.8 F | DIASTOLIC BLOOD PRESSURE: 69 MMHG | WEIGHT: 230.6 LBS | BODY MASS INDEX: 32.28 KG/M2

## 2024-06-10 DIAGNOSIS — K92.1 MELENA: ICD-10-CM

## 2024-06-10 DIAGNOSIS — D64.9 ANEMIA, UNSPECIFIED TYPE: ICD-10-CM

## 2024-06-10 PROCEDURE — 45380 COLONOSCOPY AND BIOPSY: CPT | Performed by: INTERNAL MEDICINE

## 2024-06-10 PROCEDURE — 2500000004 HC RX 250 GENERAL PHARMACY W/ HCPCS (ALT 636 FOR OP/ED): Performed by: ANESTHESIOLOGY

## 2024-06-10 PROCEDURE — 3700000002 HC GENERAL ANESTHESIA TIME - EACH INCREMENTAL 1 MINUTE: Performed by: ANESTHESIOLOGY

## 2024-06-10 PROCEDURE — 7100000010 HC PHASE TWO TIME - EACH INCREMENTAL 1 MINUTE: Performed by: ANESTHESIOLOGY

## 2024-06-10 PROCEDURE — 2500000004 HC RX 250 GENERAL PHARMACY W/ HCPCS (ALT 636 FOR OP/ED): Performed by: NURSE ANESTHETIST, CERTIFIED REGISTERED

## 2024-06-10 PROCEDURE — 3600000002 HC OR TIME - INITIAL BASE CHARGE - PROCEDURE LEVEL TWO: Performed by: ANESTHESIOLOGY

## 2024-06-10 PROCEDURE — 43239 EGD BIOPSY SINGLE/MULTIPLE: CPT | Performed by: INTERNAL MEDICINE

## 2024-06-10 PROCEDURE — 3600000007 HC OR TIME - EACH INCREMENTAL 1 MINUTE - PROCEDURE LEVEL TWO: Performed by: ANESTHESIOLOGY

## 2024-06-10 PROCEDURE — 3700000001 HC GENERAL ANESTHESIA TIME - INITIAL BASE CHARGE: Performed by: ANESTHESIOLOGY

## 2024-06-10 PROCEDURE — 2500000005 HC RX 250 GENERAL PHARMACY W/O HCPCS: Performed by: NURSE ANESTHETIST, CERTIFIED REGISTERED

## 2024-06-10 PROCEDURE — 7100000009 HC PHASE TWO TIME - INITIAL BASE CHARGE: Performed by: ANESTHESIOLOGY

## 2024-06-10 PROCEDURE — 45385 COLONOSCOPY W/LESION REMOVAL: CPT | Performed by: INTERNAL MEDICINE

## 2024-06-10 RX ORDER — LIDOCAINE HYDROCHLORIDE 10 MG/ML
INJECTION, SOLUTION EPIDURAL; INFILTRATION; INTRACAUDAL; PERINEURAL AS NEEDED
Status: DISCONTINUED | OUTPATIENT
Start: 2024-06-10 | End: 2024-06-10

## 2024-06-10 RX ORDER — MIDAZOLAM HYDROCHLORIDE 1 MG/ML
INJECTION INTRAMUSCULAR; INTRAVENOUS AS NEEDED
Status: DISCONTINUED | OUTPATIENT
Start: 2024-06-10 | End: 2024-06-10

## 2024-06-10 RX ORDER — FENTANYL CITRATE 50 UG/ML
INJECTION, SOLUTION INTRAMUSCULAR; INTRAVENOUS AS NEEDED
Status: DISCONTINUED | OUTPATIENT
Start: 2024-06-10 | End: 2024-06-10

## 2024-06-10 RX ORDER — PROPOFOL 10 MG/ML
INJECTION, EMULSION INTRAVENOUS AS NEEDED
Status: DISCONTINUED | OUTPATIENT
Start: 2024-06-10 | End: 2024-06-10

## 2024-06-10 RX ORDER — SODIUM CHLORIDE, SODIUM LACTATE, POTASSIUM CHLORIDE, CALCIUM CHLORIDE 600; 310; 30; 20 MG/100ML; MG/100ML; MG/100ML; MG/100ML
100 INJECTION, SOLUTION INTRAVENOUS CONTINUOUS
Status: DISCONTINUED | OUTPATIENT
Start: 2024-06-10 | End: 2024-06-11 | Stop reason: HOSPADM

## 2024-06-10 RX ADMIN — SODIUM CHLORIDE, POTASSIUM CHLORIDE, SODIUM LACTATE AND CALCIUM CHLORIDE: 600; 310; 30; 20 INJECTION, SOLUTION INTRAVENOUS at 11:21

## 2024-06-10 RX ADMIN — PROPOFOL 50 MG: 10 INJECTION, EMULSION INTRAVENOUS at 11:23

## 2024-06-10 RX ADMIN — PROPOFOL 40 MG: 10 INJECTION, EMULSION INTRAVENOUS at 11:29

## 2024-06-10 RX ADMIN — PROPOFOL 20 MG: 10 INJECTION, EMULSION INTRAVENOUS at 11:25

## 2024-06-10 RX ADMIN — FENTANYL CITRATE 50 MCG: 50 INJECTION, SOLUTION INTRAMUSCULAR; INTRAVENOUS at 11:34

## 2024-06-10 RX ADMIN — FENTANYL CITRATE 50 MCG: 50 INJECTION, SOLUTION INTRAMUSCULAR; INTRAVENOUS at 11:23

## 2024-06-10 RX ADMIN — LIDOCAINE HYDROCHLORIDE 5 ML: 10 INJECTION, SOLUTION EPIDURAL; INFILTRATION; INTRACAUDAL; PERINEURAL at 11:23

## 2024-06-10 RX ADMIN — PROPOFOL 40 MG: 10 INJECTION, EMULSION INTRAVENOUS at 11:27

## 2024-06-10 RX ADMIN — PROPOFOL 100 MG: 10 INJECTION, EMULSION INTRAVENOUS at 11:31

## 2024-06-10 RX ADMIN — SODIUM CHLORIDE, POTASSIUM CHLORIDE, SODIUM LACTATE AND CALCIUM CHLORIDE 100 ML/HR: 600; 310; 30; 20 INJECTION, SOLUTION INTRAVENOUS at 11:19

## 2024-06-10 RX ADMIN — MIDAZOLAM HYDROCHLORIDE 2 MG: 1 INJECTION, SOLUTION INTRAMUSCULAR; INTRAVENOUS at 11:23

## 2024-06-10 SDOH — HEALTH STABILITY: MENTAL HEALTH: CURRENT SMOKER: 0

## 2024-06-10 ASSESSMENT — PAIN - FUNCTIONAL ASSESSMENT
PAIN_FUNCTIONAL_ASSESSMENT: 0-10
PAIN_FUNCTIONAL_ASSESSMENT: 0-10

## 2024-06-10 ASSESSMENT — PAIN SCALES - GENERAL
PAINLEVEL_OUTOF10: 0 - NO PAIN

## 2024-06-10 NOTE — DISCHARGE INSTRUCTIONS
Patient Instructions after a Colonoscopy      The anesthetics, sedatives or narcotics which were given to you today will be acting in your body for the next 24 hours, so you might feel a little sleepy or groggy.  This feeling should slowly wear off. Carefully read and follow the instructions.     You received sedation today:  - Do not drive or operate any machinery or power tools of any kind.   - No alcoholic beverages today, not even beer or wine.  - Do not make any important decisions or sign any legal documents.  - No over the counter medications that contain alcohol or that may cause drowsiness.  - Do not make any important decisions or sign any legal documents.  - Make sure you have someone with you for first 24 hours.    While it is common to experience mild to moderate abdominal distention, gas, or belching after your procedure, if any of these symptoms occur following discharge from the GI Lab or within one week of having your procedure, call the [facility] to be advised whether a visit to your nearest Urgent Care or Emergency Department is indicated.  Take this paper with you if you go.     - If you develop an allergic reaction to the medications that were given during your procedure such as difficulty breathing, rash, hives, severe nausea, vomiting or lightheadedness.  - If you experience chest pain, shortness of breath, severe abdominal pain, fevers and chills.  -If you develop signs and symptoms of bleeding such as blood in your spit, if your stools turn black, tarry, or bloody  - If you have not urinated within 8 hours following your procedure.  - If your IV site becomes painful, red, inflamed, or looks infected.    If you received a biopsy/polypectomy/sphincterotomy the following instructions apply below:    __ Do not use Aspirin containing products, non-steroidal medications or anti-coagulants for one week following your procedure. (Examples of these types of medications are: Advil,  Arthrotec, Aleve, Coumadin, Ecotrin, Heparin, Ibuprofen, Indocin, Motrin, Naprosyn, Nuprin, Plavix, Vioxx, and Voltarin, or their generic forms.  This list is not all-inclusive.  Check with your physician or pharmacist before resuming medications.)   __ Eat a soft diet today.  Avoid foods that are poorly digested for the next 24 hours.  These foods would include: nuts, beans, lettuce, red meats, and fried foods. Start with liquids and advance your diet as tolerated, gradually work up to eating solids.   __ Do not have a Barium Study or Enema for one week.    Your physician recommends the additional following instructions:    -You have a contact number available for emergencies. The signs and symptoms of potential delayed complications were discussed with you. You may return to normal activities tomorrow.  -Resume your previous diet.  -Continue your present medications.   -We are waiting for your pathology results.  -Your physician has recommended a repeat colonoscopy (date to be determined after pending pathology results are reviewed) for surveillance based on pathology results.  -The findings and recommendations have been discussed with you.  -The findings and recommendations were discussed with your family.  - Please see Medication Reconciliation Form for new medication/medications prescribed.       If you experience any problems or have any questions following discharge from the GI Lab, please call:        Nurse Signature                                                                        Date___________________                                                                            Patient/Responsible Party Signature                                        Date___________________Patient Instructions after an endoscopy or colonoscopy      The anesthetics, sedatives or narcotics which were given to you today will be acting in your body for the next 24 hours, so you might feel a little sleepy or groggy.  This  feeling should slowly wear off. Carefully read and follow the instructions.     You received sedation today:  - Do not drive or operate any machinery or power tools of any kind.   - No alcoholic beverages today, not even beer or wine.  - Do not make any important decisions or sign any legal documents.  - No over the counter medications that contain alcohol or that may cause drowsiness.  - Do not make any important decisions or sign any legal documents.    While it is common to experience mild to moderate abdominal distention, gas, or belching after your procedure, if any of these symptoms occur following discharge from the GI Lab or within one week of having your procedure, call the [facility] to be advised whether a visit to your nearest Urgent Care or Emergency Department is indicated.  Take this paper with you if you go.     - If you develop an allergic reaction to the medications that were given during your procedure such as difficulty breathing, rash, hives, severe nausea, vomiting or lightheadedness.- If you experience chest pain, shortness of breath, severe abdominal pain, fevers and chills.    -If you develop signs and symptoms of bleeding such as blood in your spit, if your stools turn black, tarry, or bloody    - If you have not urinated within 8 hours following your procedure.- If your IV site becomes painful, red, inflamed, or looks infected.

## 2024-06-10 NOTE — ANESTHESIA PREPROCEDURE EVALUATION
Patient: Clifford Recinos    Procedure Information       Date/Time: 06/10/24 1240    Scheduled providers: Sharon Sainz MD; Shiva Regalado MD    Procedures:       EGD      COLONOSCOPY    Location: [facility] OR          There were no vitals filed for this visit.    Past Surgical History:   Procedure Laterality Date    COLONOSCOPY      OTHER SURGICAL HISTORY  02/21/2022    Appendectomy    OTHER SURGICAL HISTORY  02/21/2022    Cholecystectomy     Past Medical History:   Diagnosis Date    Anemia     Anxiety     BPH (benign prostatic hyperplasia)     Depression     Fracture of fourth toe, left, closed, initial encounter 10/18/2019    Fracture of fourth toe, left, open, with routine healing, subsequent encounter 10/14/2019    History of blood transfusion     GI bleed   no reaction    Hypertension     Hypogonadism male     Other instability, left knee 05/02/2018    Other instability, left knee    Pain in left knee 05/02/2018    Left knee pain, unspecified chronicity    Pain in thoracic spine 02/21/2022    Thoracic spine pain    Palpitations     PVCs (premature ventricular contractions)     Sleep apnea     having home sleep study soon    Unspecified injury of thorax, initial encounter 02/21/2022    Rib injury    Vasovagal syncope     Wears glasses     Wedge compression fracture of unspecified thoracic vertebra, initial encounter for closed fracture (Multi) 02/23/2022    Thoracic compression fracture       Current Outpatient Medications:     amLODIPine (Norvasc) 5 mg tablet, Take 1 tablet (5 mg) by mouth once daily., Disp: 30 tablet, Rfl: 11    Blood glucose monitoring meter kit (OneTouch Ultra2 Meter) kit, For BG check as needed for hypoglycemia, Disp: 1 each, Rfl: 0    buPROPion XL (Wellbutrin XL) 150 mg 24 hr tablet, Take 1 tablet (150 mg) by mouth once daily in the morning. Do not crush, chew, or split., Disp: 30 tablet, Rfl: 2    clonazePAM (KlonoPIN) 1 mg tablet, Take 1 tablet (1 mg) by mouth 2  times a day as needed for anxiety., Disp: , Rfl:     fluticasone (Flonase) 50 mcg/actuation nasal spray, USE 1 SPRAY EACH NOSTRIL TWICE DAILY - OR - USE 2 SPRAYS EACH NOSTRIL ONCE DAILY. (Patient taking differently: 2 sprays if needed for allergies.), Disp: 48 mL, Rfl: 1    lancets (OneTouch UltraSoft Lancets) misc, Check BG as needed for hypoglycemia, Disp: 100 each, Rfl: 2    OneTouch Ultra Test strip, Check BG as needed for symptoms of hypoglycemia, Disp: 100 strip, Rfl: 2    OneTouch Ultra2 Meter misc, USE TO CHECK BLOOD SUGAR, Disp: , Rfl:     pantoprazole (ProtoNix) 40 mg EC tablet, Take 1 tablet (40 mg) by mouth 2 times a day. Do not crush, chew, or split., Disp: 60 tablet, Rfl: 0    tadalafil (Cialis) 5 mg tablet, Take 1 tablet (5 mg) by mouth once daily., Disp: 30 tablet, Rfl: 11  Prior to Admission medications    Medication Sig Start Date End Date Taking? Authorizing Provider   amLODIPine (Norvasc) 5 mg tablet Take 1 tablet (5 mg) by mouth once daily. 5/9/24 5/9/25  HUMBERTO Quesada-CNP   Blood glucose monitoring meter kit (OneTouch Ultra2 Meter) kit For BG check as needed for hypoglycemia 4/11/24   Apolonia Larry MD   buPROPion XL (Wellbutrin XL) 150 mg 24 hr tablet Take 1 tablet (150 mg) by mouth once daily in the morning. Do not crush, chew, or split. 5/24/24   Lacho Hart, DO   clonazePAM (KlonoPIN) 1 mg tablet Take 1 tablet (1 mg) by mouth 2 times a day as needed for anxiety. 11/13/23   Historical Provider, MD   fluticasone (Flonase) 50 mcg/actuation nasal spray USE 1 SPRAY EACH NOSTRIL TWICE DAILY - OR - USE 2 SPRAYS EACH NOSTRIL ONCE DAILY.  Patient taking differently: 2 sprays if needed for allergies. 8/2/23   Lacho Hart, DO   lancets (OneTouch UltraSoft Lancets) misc Check BG as needed for hypoglycemia 4/11/24   Apolonia Larry MD   OneTouch Ultra Test strip Check BG as needed for symptoms of hypoglycemia 4/11/24   Apolonia Larry MD   OneTouch Ultra2 Meter misc USE TO  CHECK BLOOD SUGAR 4/15/24   Historical Provider, MD   pantoprazole (ProtoNix) 40 mg EC tablet Take 1 tablet (40 mg) by mouth 2 times a day. Do not crush, chew, or split. 4/29/24   Isaias Clinton,    tadalafil (Cialis) 5 mg tablet Take 1 tablet (5 mg) by mouth once daily. 5/7/24 5/7/25  Ada Langley MD     Allergies   Allergen Reactions    Ketorolac Anaphylaxis    Ketorolac Tromethamine Anaphylaxis    Pseudoephedrine Other    Sudafed [Pseudoephedrine Hcl] Other     HEART POUNDING    Cefdinir Diarrhea    Levofloxacin Other     Insomnia, heart pounding, gen. shaking, frequency    Nsaids (Non-Steroidal Anti-Inflammatory Drug) GI bleeding    Oxycodone-Acetaminophen Other     HA, angry    Propoxyphene N-Acetaminophen Other     HA, vision change    Doxycycline Rash     Social History     Tobacco Use    Smoking status: Never     Passive exposure: Never    Smokeless tobacco: Never   Substance Use Topics    Alcohol use: Yes     Comment: RARE         Chemistry    Lab Results   Component Value Date/Time     04/29/2024 0620    K 3.8 04/29/2024 0620     04/29/2024 0620    CO2 25 04/29/2024 0620    BUN 9 04/29/2024 0620    CREATININE 0.60 04/29/2024 0620    Lab Results   Component Value Date/Time    CALCIUM 9.3 04/29/2024 0620    ALKPHOS 43 04/28/2024 1327    AST 13 04/28/2024 1327    ALT 15 04/28/2024 1327    BILITOT 0.8 04/28/2024 1327          Lab Results   Component Value Date/Time    WBC 3.4 (L) 04/29/2024 0620    HGB 8.9 (L) 04/29/2024 0620    HCT 28.1 (L) 04/29/2024 0620     04/29/2024 0620     Lab Results   Component Value Date/Time    PROTIME 13.5 (H) 04/28/2024 1327    INR 1.2 (H) 04/28/2024 1327     Encounter Date: 04/28/24   ECG 12 lead   Result Value    Ventricular Rate 57    Atrial Rate 57    NV Interval 220    QRS Duration 102    QT Interval 442    QTC Calculation(Bazett) 430    P Axis 76    R Axis 29    T Axis 53    QRS Count 9    Q Onset 220    P Onset 110    P Offset 157    T Offset 441     QTC Fredericia 434    Narrative    Sinus bradycardia with 1st degree AV block  Otherwise normal ECG  When compared with ECG of 11-JAN-2024 11:52,  Premature ventricular complexes are no longer Present  See ED provider note for full interpretation and clinical correlation  Confirmed by Radha Groves ([id number]) on 4/30/2024 10:29:58 AM     No results found for this or any previous visit from the past 1095 days.    Relevant Problems   Cardiac   (+) Cardiac arrhythmia   (+) Essential hypertension   (+) Multiple premature ventricular complexes      Pulmonary   (+) Asthma without status asthmaticus ([facility])      Neuro   (+) Anxiety and depression      GI   (+) Irritable bowel syndrome with diarrhea      Hematology   (+) Anemia      Musculoskeletal   (+) Intractable low back pain      ID   (+) Onychomycosis       Clinical information reviewed:                   NPO Detail:  No data recorded     Physical Exam    Airway  Mallampati: II     Cardiovascular - normal exam     Dental    Pulmonary    Abdominal            Anesthesia Plan    History of general anesthesia?: yes  History of complications of general anesthesia?: no    ASA 3     MAC     The patient is not a current smoker.  Patient was not previously instructed to abstain from smoking on day of procedure.  Patient did not smoke on day of procedure.  Education provided regarding risk of obstructive sleep apnea.  intravenous induction   Anesthetic plan and risks discussed with patient.    Plan discussed with CRNA.

## 2024-06-10 NOTE — ANESTHESIA POSTPROCEDURE EVALUATION
Patient: Clifford Recinos    Procedure Summary       Date: 06/10/24 Room / Location: [facility] OR    Anesthesia Start: 1121 Anesthesia Stop: 1158    Procedures:       EGD      COLONOSCOPY Diagnosis:       Melena      Anemia, unspecified type    Scheduled Providers: Sharon Sainz MD; Shiva Regalado MD Responsible Provider: Shiva Regalado MD    Anesthesia Type: MAC ASA Status: 3            Anesthesia Type: MAC    Vitals Value Taken Time   /69 06/10/24 1241   Temp 37.1 °C (98.8 °F) 06/10/24 1156   Pulse 58 06/10/24 1241   Resp 16 06/10/24 1241   SpO2 98 % 06/10/24 1241       Anesthesia Post Evaluation    Patient location during evaluation: PACU  Patient participation: complete - patient participated  Level of consciousness: awake  Pain management: adequate  Multimodal analgesia pain management approach  Airway patency: patent  Two or more strategies used to mitigate risk of obstructive sleep apnea  Cardiovascular status: acceptable  Respiratory status: acceptable  Hydration status: acceptable  Postoperative Nausea and Vomiting: none        There were no known notable events for this encounter.

## 2024-06-11 ENCOUNTER — APPOINTMENT (OUTPATIENT)
Dept: UROLOGY | Facility: CLINIC | Age: 59
End: 2024-06-11
Payer: COMMERCIAL

## 2024-06-12 ENCOUNTER — CLINICAL SUPPORT (OUTPATIENT)
Dept: SLEEP MEDICINE | Facility: HOSPITAL | Age: 59
End: 2024-06-12
Payer: COMMERCIAL

## 2024-06-12 DIAGNOSIS — G47.33 OSA (OBSTRUCTIVE SLEEP APNEA): ICD-10-CM

## 2024-06-12 DIAGNOSIS — G47.19 EXCESSIVE DAYTIME SLEEPINESS: ICD-10-CM

## 2024-06-12 PROCEDURE — 95806 SLEEP STUDY UNATT&RESP EFFT: CPT | Performed by: STUDENT IN AN ORGANIZED HEALTH CARE EDUCATION/TRAINING PROGRAM

## 2024-06-17 ENCOUNTER — APPOINTMENT (OUTPATIENT)
Dept: NEUROLOGY | Facility: CLINIC | Age: 59
End: 2024-06-17
Payer: COMMERCIAL

## 2024-06-18 LAB
LABORATORY COMMENT REPORT: NORMAL
PATH REPORT.FINAL DX SPEC: NORMAL
PATH REPORT.GROSS SPEC: NORMAL
PATH REPORT.TOTAL CANCER: NORMAL

## 2024-06-21 ENCOUNTER — OFFICE VISIT (OUTPATIENT)
Dept: PRIMARY CARE | Facility: CLINIC | Age: 59
End: 2024-06-21
Payer: COMMERCIAL

## 2024-06-21 VITALS
OXYGEN SATURATION: 95 % | HEIGHT: 71 IN | BODY MASS INDEX: 32.36 KG/M2 | DIASTOLIC BLOOD PRESSURE: 70 MMHG | HEART RATE: 60 BPM | WEIGHT: 231.13 LBS | SYSTOLIC BLOOD PRESSURE: 113 MMHG | TEMPERATURE: 98 F

## 2024-06-21 DIAGNOSIS — R04.0 EPISTAXIS: ICD-10-CM

## 2024-06-21 DIAGNOSIS — J06.9 UPPER RESPIRATORY TRACT INFECTION, UNSPECIFIED TYPE: Primary | ICD-10-CM

## 2024-06-21 PROCEDURE — 3078F DIAST BP <80 MM HG: CPT | Performed by: FAMILY MEDICINE

## 2024-06-21 PROCEDURE — 1036F TOBACCO NON-USER: CPT | Performed by: FAMILY MEDICINE

## 2024-06-21 PROCEDURE — 3074F SYST BP LT 130 MM HG: CPT | Performed by: FAMILY MEDICINE

## 2024-06-21 PROCEDURE — 99214 OFFICE O/P EST MOD 30 MIN: CPT | Performed by: FAMILY MEDICINE

## 2024-06-21 RX ORDER — AMOXICILLIN AND CLAVULANATE POTASSIUM 875; 125 MG/1; MG/1
875 TABLET, FILM COATED ORAL 2 TIMES DAILY
Qty: 20 TABLET | Refills: 0 | Status: SHIPPED | OUTPATIENT
Start: 2024-06-21 | End: 2024-07-01

## 2024-06-21 ASSESSMENT — ENCOUNTER SYMPTOMS
DIAPHORESIS: 0
CHILLS: 0
SINUS PRESSURE: 1
SINUS PAIN: 1
FEVER: 0
NAUSEA: 0
VOMITING: 0
SORE THROAT: 1

## 2024-06-21 NOTE — PROGRESS NOTES
"Subjective   Patient ID: Clifford Recinos is a 58 y.o. male who presents for Sinus Problem (Pt presents with spouse stating sinus infection, swallowing clots of blood spouse concerned because of pts low blood count, no temperature. BL).  HPI Historian(s): Self and Wife    c/o sinus congestion. Started 3-4d ago. Real bad 2-3d ago. Still bad. Sinus headache, pressure in his eyes. Had bloody nose, \"golf-ball sized\" blood clots. Started with scratchy throat. Ears occasionally feel crackly. Bloody nose has stopped.    Denies ill contacts, fever, chills, sweats, NVD, bloody or black tarry stool.    Started fiber supplement, which helped with the diarrhea.    He and his wife report Wellbutrin and Tadalafil are both helping significantly.    Review of Systems   Constitutional:  Negative for chills, diaphoresis and fever.   HENT:  Positive for congestion, nosebleeds (resolved), sinus pressure, sinus pain and sore throat. Negative for ear pain.    Gastrointestinal:  Negative for nausea and vomiting.   All other systems reviewed and are negative.      Patient Care Team:  Lacho Hart DO as PCP - General (Family Medicine)  LUKE Quesada as Nurse Practitioner (Cardiology)  Ada Langley MD as Surgeon (Urology)    Objective   /70   Pulse 60   Temp 36.7 °C (98 °F)   Ht 1.803 m (5' 11\")   Wt 105 kg (231 lb 2 oz)   SpO2 95%   BMI 32.24 kg/m²         Physical Exam  Vitals and nursing note reviewed.   Constitutional:       General: He is not in acute distress.     Appearance: He is not diaphoretic.      Comments: No assistive device presently being used.   HENT:      Head: Normocephalic and atraumatic.      Right Ear: Tympanic membrane, ear canal and external ear normal. There is no impacted cerumen.      Left Ear: Tympanic membrane, ear canal and external ear normal. There is no impacted cerumen.      Nose: Congestion present.      Mouth/Throat:      Mouth: Mucous membranes are moist.      Pharynx: Oropharynx " is clear. No oropharyngeal exudate or posterior oropharyngeal erythema.   Eyes:      General: No scleral icterus.     Conjunctiva/sclera: Conjunctivae normal.   Cardiovascular:      Rate and Rhythm: Normal rate and regular rhythm.      Heart sounds: Normal heart sounds.   Pulmonary:      Effort: Pulmonary effort is normal.      Breath sounds: Normal breath sounds. No wheezing, rhonchi or rales.   Musculoskeletal:      Cervical back: Normal range of motion and neck supple. No tenderness.   Lymphadenopathy:      Cervical: No cervical adenopathy.   Skin:     General: Skin is warm and dry.   Neurological:      General: No focal deficit present.      Mental Status: He is alert. Mental status is at baseline.   Psychiatric:         Mood and Affect: Mood normal.         Thought Content: Thought content normal.         Assessment/Plan   Problem List Items Addressed This Visit       Upper respiratory tract infection - Primary    Current Assessment & Plan     Likely viral. Augmentin if any worsening. Cautioned regarding possible side effect of diarrhea.         Relevant Medications    amoxicillin-pot clavulanate (Augmentin) 875-125 mg tablet    Epistaxis    Current Assessment & Plan     Resolved. Labs per hematology.

## 2024-06-21 NOTE — PATIENT INSTRUCTIONS
Anticipate usual course of viral upper respiratory illness. Worst of symptoms typically lasting for about 7 days, often peaking around day 5. Improvement should typically begin between days 7-10 of illness. Resolution of symptoms typically within 2-3 weeks. Some things that might indicate something else is going on, or has developed: Fever starting after day 5 of illness, worsening of condition after day 7 of illness, not beginning to have improvement by day 10 of illness, fever (100.4 or higher) going away for 48 hours then returning, sharp stabbing ear pain, pain/redness/swelling localized over a sinus cavity, or severe or rapidly worsening symptoms.    If appropriate, Afrin/oxymetazoline for 3 days can be very helpful, for teens and adults (children 6-12 only with adult supervision). Nasal steroids (e.g., Flonase, Nasacort, etc.) may be a safer option, though not as effective. Nasal saline can also help thin the mucus to make it drain out more easily.    For a sore throat, you may try salt water gargles, straight honey. Adults may try Cepacol lozenges, Chloraseptic throat spray.    For a cough, you may try Delsym/dextromethorphan. Adults may try Coricidin HBP, Benzonatate/Tessalon Perles, cough drops.       Please return or seek medical attention if symptoms persist, change, worsen, or return. For emergencies, call 9-1-1 or go to the nearest Emergency Room. Please schedule additional problem-focused appointment(s) to address additional problem(s).    Avoid taking Biotin (a vitamin, shows up particularly in hair/nail supplements) for a week prior to any blood tests, as it can interfere with certain results. Fasting for labs means 12 hours, nothing to eat or drink, except water and medications, unless directed otherwise.    For assistance with scheduling referrals or consultations, please call [phone]. For laboratory, radiology, and other tests, please call [phone] ([phone] for pediatrics). Please  review prescription inserts and published information for possible adverse effects of all medications. Return after testing or consultation to review results and recommendations, if symptoms persist, change, worsen, or return, or if you have any question or concern. If you do not get results within 7-10 days, or you have any question or concern, please send a message, call the office ([phone]), or return to the office for a follow-up appointment. For non-emergencies, you may call the office. For emergencies, call 9-1-1 or go to the nearest Emergency Department. Please schedule additional appointment(s) to address concern(s) not addressed today.    In general, results are not released or discussed over the telephone, but at an appointment or via  [employer]. Results of tests done through [facility] are released via  [employer] (see below).  [email or web address]   [employer] support line: [phone]

## 2024-06-24 ENCOUNTER — OFFICE VISIT (OUTPATIENT)
Dept: HEMATOLOGY/ONCOLOGY | Facility: CLINIC | Age: 59
End: 2024-06-24
Payer: COMMERCIAL

## 2024-06-24 VITALS
HEIGHT: 70 IN | BODY MASS INDEX: 33.05 KG/M2 | OXYGEN SATURATION: 97 % | HEART RATE: 69 BPM | RESPIRATION RATE: 18 BRPM | TEMPERATURE: 97.2 F | DIASTOLIC BLOOD PRESSURE: 84 MMHG | SYSTOLIC BLOOD PRESSURE: 153 MMHG | WEIGHT: 230.82 LBS

## 2024-06-24 DIAGNOSIS — F32.A ANXIETY AND DEPRESSION: ICD-10-CM

## 2024-06-24 DIAGNOSIS — F41.9 ANXIETY AND DEPRESSION: ICD-10-CM

## 2024-06-24 DIAGNOSIS — D64.9 ANEMIA, UNSPECIFIED TYPE: Primary | ICD-10-CM

## 2024-06-24 DIAGNOSIS — Z12.5 SCREENING FOR PROSTATE CANCER: ICD-10-CM

## 2024-06-24 DIAGNOSIS — I10 ESSENTIAL HYPERTENSION: ICD-10-CM

## 2024-06-24 DIAGNOSIS — R73.9 HYPERGLYCEMIA: ICD-10-CM

## 2024-06-24 DIAGNOSIS — E16.2 HYPOGLYCEMIA: ICD-10-CM

## 2024-06-24 LAB
ALBUMIN SERPL BCP-MCNC: 4.8 G/DL (ref 3.4–5)
ALP SERPL-CCNC: 56 U/L (ref 33–120)
ALT SERPL W P-5'-P-CCNC: 14 U/L (ref 10–52)
ANION GAP SERPL CALC-SCNC: 16 MMOL/L (ref 10–20)
AST SERPL W P-5'-P-CCNC: 13 U/L (ref 9–39)
BASOPHILS # BLD AUTO: 0.03 X10*3/UL (ref 0–0.1)
BASOPHILS NFR BLD AUTO: 0.6 %
BILIRUB SERPL-MCNC: 0.5 MG/DL (ref 0–1.2)
BUN SERPL-MCNC: 10 MG/DL (ref 6–23)
CALCIUM SERPL-MCNC: 9.8 MG/DL (ref 8.6–10.3)
CHLORIDE SERPL-SCNC: 104 MMOL/L (ref 98–107)
CO2 SERPL-SCNC: 26 MMOL/L (ref 21–32)
CREAT SERPL-MCNC: 0.67 MG/DL (ref 0.5–1.3)
EGFRCR SERPLBLD CKD-EPI 2021: >90 ML/MIN/1.73M*2
EOSINOPHIL # BLD AUTO: 0.08 X10*3/UL (ref 0–0.7)
EOSINOPHIL NFR BLD AUTO: 1.6 %
ERYTHROCYTE [DISTWIDTH] IN BLOOD BY AUTOMATED COUNT: 16.6 % (ref 11.5–14.5)
FERRITIN SERPL-MCNC: 44 NG/ML (ref 20–300)
GLUCOSE SERPL-MCNC: 162 MG/DL (ref 74–99)
HCT VFR BLD AUTO: 34.8 % (ref 41–52)
HGB BLD-MCNC: 11 G/DL (ref 13.5–17.5)
IMM GRANULOCYTES # BLD AUTO: 0.01 X10*3/UL (ref 0–0.7)
IMM GRANULOCYTES NFR BLD AUTO: 0.2 % (ref 0–0.9)
IRON SATN MFR SERPL: 24 % (ref 25–45)
IRON SERPL-MCNC: 91 UG/DL (ref 35–150)
LDH SERPL L TO P-CCNC: 165 U/L (ref 84–246)
LYMPHOCYTES # BLD AUTO: 0.71 X10*3/UL (ref 1.2–4.8)
LYMPHOCYTES NFR BLD AUTO: 14.6 %
MCH RBC QN AUTO: 31.2 PG (ref 26–34)
MCHC RBC AUTO-ENTMCNC: 31.6 G/DL (ref 32–36)
MCV RBC AUTO: 99 FL (ref 80–100)
MONOCYTES # BLD AUTO: 0.26 X10*3/UL (ref 0.1–1)
MONOCYTES NFR BLD AUTO: 5.3 %
NEUTROPHILS # BLD AUTO: 3.78 X10*3/UL (ref 1.2–7.7)
NEUTROPHILS NFR BLD AUTO: 77.7 %
PLATELET # BLD AUTO: 393 X10*3/UL (ref 150–450)
POTASSIUM SERPL-SCNC: 3.7 MMOL/L (ref 3.5–5.3)
PROT SERPL-MCNC: 7.5 G/DL (ref 6.4–8.2)
RBC # BLD AUTO: 3.53 X10*6/UL (ref 4.5–5.9)
SODIUM SERPL-SCNC: 142 MMOL/L (ref 136–145)
TIBC SERPL-MCNC: 382 UG/DL (ref 240–445)
UIBC SERPL-MCNC: 291 UG/DL (ref 110–370)
WBC # BLD AUTO: 4.9 X10*3/UL (ref 4.4–11.3)

## 2024-06-24 PROCEDURE — 83540 ASSAY OF IRON: CPT | Performed by: PHYSICIAN ASSISTANT

## 2024-06-24 PROCEDURE — 99214 OFFICE O/P EST MOD 30 MIN: CPT | Performed by: PHYSICIAN ASSISTANT

## 2024-06-24 PROCEDURE — 36415 COLL VENOUS BLD VENIPUNCTURE: CPT | Performed by: PHYSICIAN ASSISTANT

## 2024-06-24 PROCEDURE — 82728 ASSAY OF FERRITIN: CPT | Performed by: PHYSICIAN ASSISTANT

## 2024-06-24 PROCEDURE — 85025 COMPLETE CBC W/AUTO DIFF WBC: CPT | Performed by: PHYSICIAN ASSISTANT

## 2024-06-24 PROCEDURE — 88237 TISSUE CULTURE BONE MARROW: CPT | Performed by: PHYSICIAN ASSISTANT

## 2024-06-24 PROCEDURE — 83615 LACTATE (LD) (LDH) ENZYME: CPT | Performed by: PHYSICIAN ASSISTANT

## 2024-06-24 PROCEDURE — 81450 HL NEO GSAP 5-50DNA/DNA&RNA: CPT | Performed by: PHYSICIAN ASSISTANT

## 2024-06-24 PROCEDURE — 3077F SYST BP >= 140 MM HG: CPT | Performed by: PHYSICIAN ASSISTANT

## 2024-06-24 PROCEDURE — 88185 FLOWCYTOMETRY/TC ADD-ON: CPT | Mod: TC,91 | Performed by: PHYSICIAN ASSISTANT

## 2024-06-24 PROCEDURE — 82607 VITAMIN B-12: CPT | Mod: GEALAB | Performed by: PHYSICIAN ASSISTANT

## 2024-06-24 PROCEDURE — 3079F DIAST BP 80-89 MM HG: CPT | Performed by: PHYSICIAN ASSISTANT

## 2024-06-24 PROCEDURE — 84075 ASSAY ALKALINE PHOSPHATASE: CPT | Performed by: PHYSICIAN ASSISTANT

## 2024-06-24 ASSESSMENT — PAIN SCALES - GENERAL: PAINLEVEL: 3

## 2024-06-24 ASSESSMENT — PATIENT HEALTH QUESTIONNAIRE - PHQ9
SUM OF ALL RESPONSES TO PHQ9 QUESTIONS 1 AND 2: 0
2. FEELING DOWN, DEPRESSED OR HOPELESS: NOT AT ALL
1. LITTLE INTEREST OR PLEASURE IN DOING THINGS: NOT AT ALL

## 2024-06-24 ASSESSMENT — COLUMBIA-SUICIDE SEVERITY RATING SCALE - C-SSRS
6. HAVE YOU EVER DONE ANYTHING, STARTED TO DO ANYTHING, OR PREPARED TO DO ANYTHING TO END YOUR LIFE?: NO
1. IN THE PAST MONTH, HAVE YOU WISHED YOU WERE DEAD OR WISHED YOU COULD GO TO SLEEP AND NOT WAKE UP?: NO
2. HAVE YOU ACTUALLY HAD ANY THOUGHTS OF KILLING YOURSELF?: NO

## 2024-06-24 NOTE — PROGRESS NOTES
[facility] Sleep study completed by Silvestre on 6/12/2024.

## 2024-06-24 NOTE — PROGRESS NOTES
Patient Visit Information:   Visit Type: Benign Heme Follow-up     Cancer History:   Treatment Synopsis:    pt is a pleasant 57-year-old  male was referred by Dr. De for evaluation of leukopenia and normocytic anemia     pt has been feeling tired for many years and has been progressively worsened. c/o leg and join and whole body muscle achiness. had lots of fracture in the past.     cbc in 6/2022 showed hgb 11.3, down from 12.5 in 4/2022. stated was also found to have anemic 2 years ago at [facility].    denies bleeding. last colonoscopy was maybe 6 yrs ago per pt. otherwise doing fine denies fever chills night sweats decreased appetite weight loss palpitation chest pain nausea vomiting diarrhea constipation abdominal pain or urinary symptoms    Past medical history anxiety depression, hypertension, back pain, irritable bowel syndrome, compression fracture of T9 vertebra,     Past surgical history appendectomy cholecystectomy    Family history prostate cancer      History of Present Illness:   Admitted after syncopal episode. Noted to have worsening anemia. Reports having intermittent black stools and BRBPR. Outpatient C-scope and EGD with polyps removed in transverse and descending colon path c/w TA, hemorrhoids, Hiatal hernia without [place] lesions     pt has been feeling tired for many years and has been progressively worsening. Having flares of IBS that worsen his fatigue. Changed from Zoloft to Lexapro due to cost and doesn't seem to be helping him. C/F cardiac issue due to low BP and HR. c/o leg and join and whole body muscle achiness. Didn't go to his rheum appointment. Otherwise doing well.     Review of Systems:    All of the systems have been reviewed and are negative for complaints except what is stated in the HPI and/or past medical history    Allergies and Intolerances:       Allergies:   nsaids: Drug, Mood Alteration, Active   doxycycline: Drug, Other, Active       Intolerances:   Percocet: Drug,  "Mood Alteration, Active    Outpatient Medication Profile:  * Patient Currently Takes Medications as of 03-Jul-2023 14:08 documented in Structured Notes   escitalopram 10 mg oral tablet: Last Dose Taken:  , 1 tab(s) orally once a day   amLODIPine 5 mg oral tablet: Last Dose Taken:  , 1 tab(s) orally once a day      Family History: No Family History items are recorded in the problem list.     Social History:   Social Substance History:  · Smoking Status never smoker (1)   · Tobacco Use denies   · Alcohol Use denies     Visit Vitals  /84 (BP Location: Left arm, Patient Position: Sitting, BP Cuff Size: Adult)   Pulse 69   Temp 36.2 °C (97.2 °F) (Temporal)   Resp 18   Ht (S) 1.773 m (5' 9.8\")   Wt 105 kg (230 lb 13.2 oz)   SpO2 97%   BMI 33.31 kg/m²   Smoking Status Never   BSA 2.27 m²      PHYSICAL EXAM:  Constitutional: alert, awake and oriented, not in acute distress   HEENT: moist mucous membranes, normal nose   Neck: supple, no lymphadenopathy   EYES: PERRL, EOM intact, conjunctiva normal  Skin: no jaundice, rash or erythema  Neurological: AAOx3, no gross focal deficit   Psychiatric: normal mood and behavior     Labs:  Lab Results   Component Value Date    WBC 4.9 06/24/2024    NEUTROABS 3.78 06/24/2024    IGABSOL 0.01 06/24/2024    LYMPHSABS 0.71 (L) 06/24/2024    MONOSABS 0.26 06/24/2024    EOSABS 0.08 06/24/2024    BASOSABS 0.03 06/24/2024    RBC 3.53 (L) 06/24/2024    MCV 99 06/24/2024    MCHC 31.6 (L) 06/24/2024    HGB 11.0 (L) 06/24/2024    HCT 34.8 (L) 06/24/2024     06/24/2024     Lab Results   Component Value Date    RETICCTPCT 2.0 08/10/2022      Lab Results   Component Value Date    CREATININE 0.60 04/29/2024    BUN 9 04/29/2024    EGFR >90 04/29/2024     04/29/2024    K 3.8 04/29/2024     04/29/2024    CO2 25 04/29/2024      Lab Results   Component Value Date    ALT 15 04/28/2024    AST 13 04/28/2024    ALKPHOS 43 04/28/2024    BILITOT 0.8 04/28/2024      Lab Results   Component " "Value Date    TSH 1.72 04/12/2024     Lab Results   Component Value Date    TSH 1.72 04/12/2024     Lab Results   Component Value Date    IRON 118 12/28/2023    TIBC 325 12/28/2023    FERRITIN 236 12/28/2023      Lab Results   Component Value Date    XRTXPGUR07 749 12/28/2023      Lab Results   Component Value Date    FOLATE >24.0 08/10/2022     Lab Results   Component Value Date    KENIA POSITIVE (A) 08/10/2022    RF <10 01/09/2023    SEDRATE 10 08/10/2022      Lab Results   Component Value Date    CRP 0.40 08/10/2022      No results found for: \"TIFFANIE\"  Lab Results   Component Value Date     08/10/2022     No results found for: \"HAPTOGLOBIN\"  Lab Results   Component Value Date    SPEP NORMAL 08/10/2022       Assessment:    57-year-old  male presented with long standing h/o normocytic anemia and leukopenia mainly lymphocytopenia    Compression fracture of thoracic spine    Fatigue disproportional to the degree of the anemia, weakness and body achiness, +KENIA      Plan:    Discussed with the patient and wife in detail regarding diagnosis etiologies of anemia    Cards and GI referral    F/u w/ophtho     will consider BM bx if hgb lower than 10 or if no other etiology has been found for his fatigue. Currently having no B symptoms.     Recommend oral iron as tolerated.    Will repeat CBC in 6 m    f/u w/PCP    Patient verbalized understanding, and all his questions were answered to his satisfaction    RTC in 6 months    30 min spent with patient greater than 50 % of which was spent in consultation and coordination of care.  "

## 2024-06-25 LAB — VIT B12 SERPL-MCNC: >2000 PG/ML (ref 211–911)

## 2024-06-25 RX ORDER — DEXTROSE 4 G
TABLET,CHEWABLE ORAL
Qty: 1 EACH | Refills: 0 | Status: SHIPPED | OUTPATIENT
Start: 2024-06-25

## 2024-06-26 LAB
CELL COUNT (BLOOD): 4.9 X10*3/UL
CELL POPULATIONS: NORMAL
CYTOGENETICS/MOLECULAR TEST ORDERED: NORMAL
DIAGNOSIS: NORMAL
FLOW DIFFERENTIAL: NORMAL
FLOW TEST ORDERED: NORMAL
LAB TEST METHOD: NORMAL
NUMBER OF CELLS COLLECTED: NORMAL PER TUBE
PATH REPORT.TOTAL CANCER: NORMAL
RBC MORPH BLD: NORMAL
SIGNATURE COMMENT: NORMAL
SPECIMEN VIABILITY: NORMAL
WBC MORPH BLD: NORMAL

## 2024-06-26 RX ORDER — BUPROPION HYDROCHLORIDE 150 MG/1
150 TABLET ORAL EVERY MORNING
Qty: 90 TABLET | Refills: 1 | Status: SHIPPED | OUTPATIENT
Start: 2024-06-26

## 2024-06-28 LAB
CHROM ANALY OVERALL INTERP-IMP: NORMAL
ELECTRONICALLY SIGNED BY CYTOGENETICS: NORMAL
ELECTRONICALLY SIGNED BY: NORMAL
MYELOID NGS RESULTS: NORMAL

## 2024-07-11 ENCOUNTER — OFFICE VISIT (OUTPATIENT)
Dept: CARDIOLOGY | Facility: HOSPITAL | Age: 59
End: 2024-07-11
Payer: COMMERCIAL

## 2024-07-11 DIAGNOSIS — I49.3 PVC (PREMATURE VENTRICULAR CONTRACTION): Primary | ICD-10-CM

## 2024-07-11 PROCEDURE — 3077F SYST BP >= 140 MM HG: CPT | Performed by: NURSE PRACTITIONER

## 2024-07-11 PROCEDURE — 99214 OFFICE O/P EST MOD 30 MIN: CPT | Performed by: NURSE PRACTITIONER

## 2024-07-11 PROCEDURE — 3078F DIAST BP <80 MM HG: CPT | Performed by: NURSE PRACTITIONER

## 2024-07-11 RX ORDER — METOPROLOL SUCCINATE 25 MG/1
25 TABLET, EXTENDED RELEASE ORAL DAILY
Qty: 30 TABLET | Refills: 11 | Status: SHIPPED | OUTPATIENT
Start: 2024-07-11 | End: 2025-07-11

## 2024-07-11 NOTE — PROGRESS NOTES
Chief Complaint:   Follow up zio results      History Of Present Illness:    Clifford Recinos is a 58 y.o. male with h/o anxiety, depression, BPH, htn, and PVCs presenting today for follow up to discuss recent zio results.  Has been feeling more anxious recently-- currently taking Wellbutrin, but trying to restart brand name zoloft as he cannot tolerate generic formulation.  Wellbutrin is not really helping.  Feels tired with joint and body aches.  Denies chest pain or pressure.  Does continue to have palpitations that feel like pounding and racing sensation.  Was not able to tolerate diltiazem in the past-- developed right flank pain after starting it.      Last Recorded Vitals:  Vitals:    07/11/24 1342   BP: 151/74   Pulse: 59   SpO2: 98%   Weight: 105 kg (230 lb 13.2 oz)       Past Medical History:  He has a past medical history of Anemia, Anxiety, BPH (benign prostatic hyperplasia), Depression, Fracture of fourth toe, left, closed, initial encounter (10/18/2019), Fracture of fourth toe, left, open, with routine healing, subsequent encounter (10/14/2019), History of blood transfusion, Hypertension, Hypogonadism male, Other instability, left knee (05/02/2018), Pain in left knee (05/02/2018), Pain in thoracic spine (02/21/2022), Palpitations, PVCs (premature ventricular contractions), Sleep apnea, Unspecified injury of thorax, initial encounter (02/21/2022), Vasovagal syncope, Wears glasses, and Wedge compression fracture of unspecified thoracic vertebra, initial encounter for closed fracture (Multi) (02/23/2022).    Past Surgical History:  He has a past surgical history that includes Other surgical history (02/21/2022); Other surgical history (02/21/2022); and Colonoscopy.      Social History:  He reports that he has never smoked. He has never been exposed to tobacco smoke. He has never used smokeless tobacco. He reports current alcohol use. He reports that he does not use drugs.    Family History:  Family History    Problem Relation Name Age of Onset    Other (CABG) Mother      Other (cardiac pacemaker) Father      Other (malignant neoplasm of prostate) Father      Other (sepsis) Father      Other (stroke syndrome) Father      Other (PTCA) Sister          Allergies:  Ketorolac, Ketorolac tromethamine, Pseudoephedrine, Sudafed [pseudoephedrine hcl], Cefdinir, Levofloxacin, Nsaids (non-steroidal anti-inflammatory drug), Oxycodone-acetaminophen, Propoxyphene n-acetaminophen, and Doxycycline    Outpatient Medications:  Current Outpatient Medications   Medication Instructions    amLODIPine (NORVASC) 5 mg, oral, Daily    blood-glucose meter (OneTouch Ultra2 Meter) misc USE TO CHECK BLOOD SUGAR    buPROPion XL (WELLBUTRIN XL) 150 mg, oral, Every morning, Do not crush, chew, or split.    clonazePAM (KLONOPIN) 1 mg, oral, 2 times daily PRN    fluticasone (Flonase) 50 mcg/actuation nasal spray USE 1 SPRAY EACH NOSTRIL TWICE DAILY - OR - USE 2 SPRAYS EACH NOSTRIL ONCE DAILY.    lancets (OneTouch UltraSoft Lancets) misc Check BG as needed for hypoglycemia    metoprolol succinate XL (TOPROL-XL) 25 mg, oral, Daily, Do not crush or chew.    OneTouch Ultra Test strip Check BG as needed for symptoms of hypoglycemia    OneTouch Ultra2 Meter misc USE TO CHECK BLOOD SUGAR    pantoprazole (PROTONIX) 40 mg, oral, 2 times daily, Do not crush, chew, or split.    tadalafil (CIALIS) 5 mg, oral, Daily       Physical Exam:  Constitutional: Pleasant, Awake/Alert/Oriented to person place and time. No distress  Head: Atraumatic, Normocephalic  Eyes: EOMI. SUKH  Neck: No JVD  Cardiovascular: Regular rate and rhythm, S1, S2. No extra heart sounds or murmurs  Respiratory: Clear to auscultation bilaterally. No wheezing, rales or rhonchi. Good chest wall expansion  Abdomen: Soft, Nontender. Bowel sounds appreciated  Musculoskeletal: ROM intact. Muscle strength grossly intact upper and lower extremities 5/5.   Neurological: CNII-XII intact. Sensation grossly  intact  Extremities: Warm and dry. No acute rashes and lesions  Psychiatric: Appropriate mood and affect       Last Labs:  CBC -  Lab Results   Component Value Date    WBC 4.9 06/24/2024    HGB 11.0 (L) 06/24/2024    HCT 34.8 (L) 06/24/2024    MCV 99 06/24/2024     06/24/2024       CMP -  Lab Results   Component Value Date    CALCIUM 9.8 06/24/2024    PHOS 3.9 04/29/2024    PROT 7.5 06/24/2024    ALBUMIN 4.8 06/24/2024    AST 13 06/24/2024    ALT 14 06/24/2024    ALKPHOS 56 06/24/2024    BILITOT 0.5 06/24/2024       LIPID PANEL -   Lab Results   Component Value Date    CHOL 128 04/28/2024    TRIG 71 04/28/2024    HDL 43.4 04/28/2024    CHHDL 2.9 04/28/2024    LDLF 100 (H) 04/21/2022    VLDL 14 04/28/2024    NHDL 85 04/28/2024       RENAL FUNCTION PANEL -   Lab Results   Component Value Date    GLUCOSE 162 (H) 06/24/2024     06/24/2024    K 3.7 06/24/2024     06/24/2024    CO2 26 06/24/2024    ANIONGAP 16 06/24/2024    BUN 10 06/24/2024    CREATININE 0.67 06/24/2024    GFRMALE >90 01/09/2023    CALCIUM 9.8 06/24/2024    PHOS 3.9 04/29/2024    ALBUMIN 4.8 06/24/2024        Lab Results   Component Value Date     (H) 04/28/2024    HGBA1C 4.8 04/28/2024       Last Cardiology Tests:  Echo:  Transthoracic Echo (TTE) Complete 04/29/2024  CONCLUSIONS:   1. Left ventricular systolic function is normal with a 60-65% estimated ejection fraction.   2. The left atrium is moderate to severely dilated.   3. There is mild mitral and tricuspid regurgitation.   4. There is no evidence of a patent foramen ovale.   5. The estimated pulmonary artery pressure is mildly elevated with the RVSP at 48.7 mmHg.    2/1/2024 echo:  CONCLUSIONS:   1. Left ventricular systolic function is normal with a 60% estimated ejection fraction.   2. There is mild mitral regurgitation.    Stress Test:  Stress Test 02/01/2024  Summary:   1. Frequent PVCs and Bigeminies with stress.   2. Abnormal Stress Test.   3. Adequate level of  stress achieved.    Cardiac Imaging:  CT HEART CALCIUM SCORING WO IV CONTRAST 06/09/2022  IMPRESSION:  1. Coronary artery calcium score of  0*.  2. Mild aneurysmal dilatation of the thoracic aorta (4.1cm). Recommend  follow-up with contrasted chest CT in 2 years for surveillance.    5/23/2024-6/5/2024 zio:  Min HR 36bpm, max HR 185bpm, Avg HR 63bpm  1 run non-sustained VT lasting 4 beats (may be SVT with aberrancy)   63 SVT events longest lasting 19 beats  Isolated PVCs 1.5% burden    Lab review: I have personally reviewed the laboratory result(s)   Diagnostic review: I have personally reviewed the result(s) of the Echocardiogram, stress, and CCS     Assessment/Plan   58 y.o. male with h/o anxiety, depression, BPH, htn, and PVCs presenting today for follow up to discuss recent zio results.  Feeling more anxious recently-- would like to go back on zoloft and will discuss with PCP, however, he attributes elevated BP to this.  Continues to have palpitations-- zio shows NSR with 1.5% PVC burden.  EF preserved, no significant VHD.     Plan:  -Start Toprol XL 25mg daily  -Continue amlodipine 5mg daily  -Follow up in 6 months or sooner if needed       HUMBERTO Quesada-CNP

## 2024-07-12 ENCOUNTER — APPOINTMENT (OUTPATIENT)
Dept: NEUROLOGY | Facility: CLINIC | Age: 59
End: 2024-07-12
Payer: COMMERCIAL

## 2024-07-12 VITALS
HEART RATE: 59 BPM | OXYGEN SATURATION: 98 % | WEIGHT: 230.82 LBS | SYSTOLIC BLOOD PRESSURE: 142 MMHG | BODY MASS INDEX: 33.31 KG/M2 | DIASTOLIC BLOOD PRESSURE: 71 MMHG

## 2024-07-15 ENCOUNTER — APPOINTMENT (OUTPATIENT)
Dept: PRIMARY CARE | Facility: CLINIC | Age: 59
End: 2024-07-15
Payer: COMMERCIAL

## 2024-07-15 VITALS
HEART RATE: 65 BPM | WEIGHT: 234.25 LBS | SYSTOLIC BLOOD PRESSURE: 131 MMHG | HEIGHT: 70 IN | BODY MASS INDEX: 33.53 KG/M2 | DIASTOLIC BLOOD PRESSURE: 77 MMHG | OXYGEN SATURATION: 95 %

## 2024-07-15 DIAGNOSIS — F41.9 ANXIETY AND DEPRESSION: Primary | ICD-10-CM

## 2024-07-15 DIAGNOSIS — R68.82 REDUCED LIBIDO: ICD-10-CM

## 2024-07-15 DIAGNOSIS — F32.A ANXIETY AND DEPRESSION: Primary | ICD-10-CM

## 2024-07-15 DIAGNOSIS — D64.9 ANEMIA, UNSPECIFIED TYPE: ICD-10-CM

## 2024-07-15 PROCEDURE — 99214 OFFICE O/P EST MOD 30 MIN: CPT | Performed by: FAMILY MEDICINE

## 2024-07-15 PROCEDURE — 1036F TOBACCO NON-USER: CPT | Performed by: FAMILY MEDICINE

## 2024-07-15 PROCEDURE — 3078F DIAST BP <80 MM HG: CPT | Performed by: FAMILY MEDICINE

## 2024-07-15 PROCEDURE — 3075F SYST BP GE 130 - 139MM HG: CPT | Performed by: FAMILY MEDICINE

## 2024-07-15 RX ORDER — SERTRALINE HYDROCHLORIDE 50 MG/1
50 TABLET, FILM COATED ORAL DAILY
Qty: 100 TABLET | Refills: 0 | Status: SHIPPED | OUTPATIENT
Start: 2024-07-15

## 2024-07-15 ASSESSMENT — ENCOUNTER SYMPTOMS
NERVOUS/ANXIOUS: 1
DYSPHORIC MOOD: 1

## 2024-07-15 NOTE — ASSESSMENT & PLAN NOTE
Try Sertraline. Might try branded Zoloft if leg cramps appear to be caused by the Sertraline. Might next try Trintellix, Viibryd, or an SNRI.    Orders:    Follow Up In Primary Care - Established; Future    sertraline (Zoloft) 50 mg tablet; Take 1 tablet (50 mg) by mouth once daily.

## 2024-07-15 NOTE — PATIENT INSTRUCTIONS
Start Sertraline at 25 mg (half tablet of 50 mg) for about 2 weeks, then plan to increase to full 50 mg tablet.    Please return for a(n) anxiety, depression, and medication follow-up appointment in 3 months, earlier if any question or concern. Please schedule additional problem-focused appointment(s) to address additional problem(s).    Avoid taking Biotin (a vitamin, shows up particularly in hair/nail supplements) for a week prior to any blood tests, as it can interfere with certain results. Fasting for labs means 12 hours, nothing to eat or drink, except water and medications, unless directed otherwise.    For assistance with scheduling referrals or consultations, please call [phone]. For laboratory, radiology, and other tests, please call [phone] ([phone] for pediatrics). Please review prescription inserts and published information for possible adverse effects of all medications. Return after testing or consultation to review results and recommendations, if symptoms persist, change, worsen, or return, or if you have any question or concern. If you do not get results within 7-10 days, or you have any question or concern, please send a message, call the office ([phone]), or return to the office for a follow-up appointment. For non-emergencies, you may call the office. For emergencies, call 9-1-1 or go to the nearest Emergency Department. Please schedule additional appointment(s) to address concern(s) not addressed today.    In general, results are not released or discussed over the telephone, but at an appointment or via  [email or web address]. Results of tests done through [facility] are released via  [email or web address] (see below).  [email or web address]   [email or web address] support line: [phone]

## 2024-07-15 NOTE — PROGRESS NOTES
"Subjective   Patient ID: Clifford Recinos is a 58 y.o. male who presents for Follow-up (Pt presents with spouse stating that Wellbutrin had the opposite result became more irritable, more anxious, BL  ).  HPI Historian(s): Self and Wife    c/o increased anxiety, but did help with depression (\"didn't feel dead inside\").    Zoloft helped the best, but had to take branded due to leg aches. Has Medicare now, so cost is prohibitive. Lexapro made him \"feel dead inside.\" Has been getting leg cramps recently, but he attributes this do decreased hydration; he has been decreasing intake of water due to incontinence which he attributes to Wellbutrin.    Review of Systems   Psychiatric/Behavioral:  Positive for dysphoric mood. Negative for self-injury and suicidal ideas. The patient is nervous/anxious.    All other systems reviewed and are negative.      Patient Care Team:  Lacho Hart DO as PCP - General (Family Medicine)  HUMBERTO Quesada-CNP as Nurse Practitioner (Cardiology)  Ada Langley MD as Surgeon (Urology)    Objective   /77   Pulse 65   Ht 1.773 m (5' 9.8\")   Wt 106 kg (234 lb 4 oz)   SpO2 95%   BMI 33.80 kg/m²         Physical Exam  Vitals and nursing note reviewed.   Constitutional:       General: He is not in acute distress.     Appearance: Normal appearance.      Comments: No assistive device presently being used.   HENT:      Head: Normocephalic and atraumatic.   Eyes:      General: No scleral icterus.     Extraocular Movements: Extraocular movements intact.      Conjunctiva/sclera: Conjunctivae normal.   Pulmonary:      Effort: Pulmonary effort is normal. No respiratory distress.   Skin:     General: Skin is warm and dry.      Coloration: Skin is not jaundiced.   Neurological:      Mental Status: He is alert and oriented to person, place, and time. Mental status is at baseline.   Psychiatric:         Mood and Affect: Affect normal. Mood is anxious and depressed.         Behavior: Behavior " normal.         Assessment & Plan  Anxiety and depression  Try Sertraline. Might try branded Zoloft if leg cramps appear to be caused by the Sertraline. Might next try Trintellix, Viibryd, or an SNRI.    Orders:    Follow Up In Primary Care - Established; Future    sertraline (Zoloft) 50 mg tablet; Take 1 tablet (50 mg) by mouth once daily.    Anemia, unspecified type  Continue with hematology.       Reduced libido  Likely secondary to hypogonadism. Did not tolerate Wellbutrin.

## 2024-07-16 NOTE — PROGRESS NOTES
"[facility]  Followup Visit Note    HISTORY OF PRESENT ILLNESS   Clifford Recinos is a 58 y.o. male who presents to a [facility] for followup.       Current History    His sleep study showed severe sleep apnea with AHI of 37 and SpO2 chacha of 83%.  He is open to CPAP therapy.    Symptoms of sleep apnea include daytime sleepiness, frequent waking at night and poor sleep quality. He also c/o frequent morning headaches.    Previous visit 05/23/2024  OBSTRUCTIVE SLEEP APNEA / SLEEPINESS/ FREQUENT WAKING  -Ordering sleep study to evaluate    BMI>31  -Body mass index is 32.01 kg/m².  today  -With sufficient weight loss may no longer require treatment for RADHA    HYPERTENSION  BP Readings from Last 3 Encounters:   05/24/24 152/54   05/23/24 146/77   05/09/24 170/77      -Well controlled with current medications     Nocturnal leg mvmts  -RLS unlikely based on symptoms  -limb movement possibly secondary to untreated RADHA    Followup 3 weeks after sleep study to review results     Sleep Scales:  ESS: 8     REVIEW OF SYSTEMS    All other systems negative      PHYSICAL EXAM     VITAL SIGNS: /79   Pulse 75   Ht 1.803 m (5' 11\")   Wt 104 kg (229 lb)   SpO2 95%   BMI 31.94 kg/m²      PREVIOUS WEIGHTS:  Wt Readings from Last 3 Encounters:   07/18/24 104 kg (229 lb)   07/15/24 106 kg (234 lb 4 oz)   07/11/24 105 kg (230 lb 13.2 oz)       Constitutional: Alert and oriented, cooperative, no obvious distress   HEENT: Non icteric or anemic, EOM WNL bilaterally   Neck: Supple, no JVD, no goiter, no adenopathy, no rigidity  Extremities: No clubbing, no LL edema   Neuromuscular: Cranial nerves grossly intact, no focal deficits     RESULTS/DATA     Iron (ug/dL)   Date Value   06/24/2024 91   12/28/2023 118   07/03/2023 143   04/21/2022 106     % Saturation (%)   Date Value   06/24/2024 24 (L)   12/28/2023 36     Iron Saturation (%)   Date Value   07/03/2023 48 (H) "   04/21/2022 30     TIBC (ug/dL)   Date Value   06/24/2024 382   12/28/2023 325   07/03/2023 301   04/21/2022 353     Ferritin   Date Value   06/24/2024 44 ng/mL   12/28/2023 236 ng/mL   07/03/2023 264 ug/L   04/21/2022 186 ug/L         ASSESSMENT/PLAN     Mr. Recinos is a 58 y.o. male and returns in followup for the following issues:    OBSTRUCTIVE SLEEP APNEA / SLEEPINESS / FREQUENT WAKING  -Reviewed test results with patient  -[place] [facility] [place] 4-14 cm H2O  -Discussed mask options and common tolerance issues  -Goal of CPAP includes less daytime sleepiness, less waking at night, better BP control    BMI>30  -Body mass index is 31.94 kg/m².  today  -With sufficient weight loss may no longer require treatment for RADHA    HYPERTENSION  BP Readings from Last 3 Encounters:   07/18/24 139/79   07/15/24 131/77   07/12/24 142/71      -Well controlled with current medications     Followup 31-90 days after starting CPAP

## 2024-07-18 ENCOUNTER — OFFICE VISIT (OUTPATIENT)
Dept: SLEEP MEDICINE | Facility: CLINIC | Age: 59
End: 2024-07-18
Payer: COMMERCIAL

## 2024-07-18 VITALS
SYSTOLIC BLOOD PRESSURE: 139 MMHG | DIASTOLIC BLOOD PRESSURE: 79 MMHG | BODY MASS INDEX: 32.06 KG/M2 | HEIGHT: 71 IN | OXYGEN SATURATION: 95 % | HEART RATE: 75 BPM | WEIGHT: 229 LBS

## 2024-07-18 DIAGNOSIS — G47.00 PERSISTENT DISORDER OF INITIATING OR MAINTAINING SLEEP: ICD-10-CM

## 2024-07-18 DIAGNOSIS — G47.33 OSA (OBSTRUCTIVE SLEEP APNEA): Primary | ICD-10-CM

## 2024-07-18 DIAGNOSIS — G47.19 EXCESSIVE DAYTIME SLEEPINESS: ICD-10-CM

## 2024-07-18 DIAGNOSIS — R25.8 NOCTURNAL LEG MOVEMENTS: ICD-10-CM

## 2024-07-18 PROCEDURE — 99213 OFFICE O/P EST LOW 20 MIN: CPT | Performed by: PHYSICIAN ASSISTANT

## 2024-07-18 PROCEDURE — G2211 COMPLEX E/M VISIT ADD ON: HCPCS | Performed by: PHYSICIAN ASSISTANT

## 2024-07-18 PROCEDURE — 3008F BODY MASS INDEX DOCD: CPT | Performed by: PHYSICIAN ASSISTANT

## 2024-07-18 PROCEDURE — 3075F SYST BP GE 130 - 139MM HG: CPT | Performed by: PHYSICIAN ASSISTANT

## 2024-07-18 PROCEDURE — 3078F DIAST BP <80 MM HG: CPT | Performed by: PHYSICIAN ASSISTANT

## 2024-07-18 ASSESSMENT — SLEEP AND FATIGUE QUESTIONNAIRES
HOW LIKELY ARE YOU TO NOD OFF OR FALL ASLEEP WHEN YOU ARE A PASSENGER IN A CAR FOR AN HOUR WITHOUT A BREAK: WOULD NEVER DOZE
ESS-CHAD TOTAL SCORE: 8
HOW LIKELY ARE YOU TO NOD OFF OR FALL ASLEEP WHILE SITTING AND READING: MODERATE CHANCE OF DOZING
HOW LIKELY ARE YOU TO NOD OFF OR FALL ASLEEP WHILE WATCHING TV: SLIGHT CHANCE OF DOZING
HOW LIKELY ARE YOU TO NOD OFF OR FALL ASLEEP WHILE SITTING AND TALKING TO SOMEONE: WOULD NEVER DOZE
HOW LIKELY ARE YOU TO NOD OFF OR FALL ASLEEP IN A CAR, WHILE STOPPED FOR A FEW MINUTES IN TRAFFIC: WOULD NEVER DOZE
SITING INACTIVE IN A PUBLIC PLACE LIKE A CLASS ROOM OR A MOVIE THEATER: WOULD NEVER DOZE
HOW LIKELY ARE YOU TO NOD OFF OR FALL ASLEEP WHILE LYING DOWN TO REST IN THE AFTERNOON WHEN CIRCUMSTANCES PERMIT: HIGH CHANCE OF DOZING
HOW LIKELY ARE YOU TO NOD OFF OR FALL ASLEEP WHILE SITTING QUIETLY AFTER LUNCH WITHOUT ALCOHOL: MODERATE CHANCE OF DOZING

## 2024-07-18 ASSESSMENT — LIFESTYLE VARIABLES
HOW MANY STANDARD DRINKS CONTAINING ALCOHOL DO YOU HAVE ON A TYPICAL DAY: 1 OR 2
SKIP TO QUESTIONS 9-10: 1
AUDIT-C TOTAL SCORE: 1
HOW OFTEN DO YOU HAVE SIX OR MORE DRINKS ON ONE OCCASION: NEVER
HOW OFTEN DO YOU HAVE A DRINK CONTAINING ALCOHOL: MONTHLY OR LESS

## 2024-07-18 ASSESSMENT — PATIENT HEALTH QUESTIONNAIRE - PHQ9
SUM OF ALL RESPONSES TO PHQ9 QUESTIONS 1 & 2: 2
2. FEELING DOWN, DEPRESSED OR HOPELESS: SEVERAL DAYS
1. LITTLE INTEREST OR PLEASURE IN DOING THINGS: SEVERAL DAYS
10. IF YOU CHECKED OFF ANY PROBLEMS, HOW DIFFICULT HAVE THESE PROBLEMS MADE IT FOR YOU TO DO YOUR WORK, TAKE CARE OF THINGS AT HOME, OR GET ALONG WITH OTHER PEOPLE: SOMEWHAT DIFFICULT

## 2024-07-18 ASSESSMENT — PAIN SCALES - GENERAL: PAINLEVEL: 0-NO PAIN

## 2024-07-18 NOTE — PATIENT INSTRUCTIONS
Good seeing you today,    [place] [facility] 4-14 cm H2O. If you feel uncomfortable with pressure settings let me know and I can change them online.    Some mask options I recommend    Resmed N30i nasal mask (tube on top of head) - good option if you toss and turn a lot  Resmed N30 nasal mask (tube in front)  Barnard rhoda fx nasal pillow mask (tube in front)  Resmed P10 nasal pillow mask (tube in front)  Resmed P30i nasal mask (tube on top of head) - good option if you toss and turn a lot    You can change humidity settings based on comfort    We will plan on seeing you back in 31-90 days for a required compliance appointment. Try to use at least 4 hours per night for >70% of nights.    Israel Washington PA-C    IMPORTANT PHONE NUMBERS     [phone] ([phone])  [employer]): [phone]  For clinical questions and refilling prescriptions: [phone]  Rima De Anda (For Bao/Padmini): P: [phone]

## 2024-07-24 ENCOUNTER — APPOINTMENT (OUTPATIENT)
Dept: UROLOGY | Facility: CLINIC | Age: 59
End: 2024-07-24
Payer: COMMERCIAL

## 2024-07-29 ENCOUNTER — APPOINTMENT (OUTPATIENT)
Dept: UROLOGY | Facility: CLINIC | Age: 59
End: 2024-07-29
Payer: COMMERCIAL

## 2024-07-29 VITALS — TEMPERATURE: 98 F

## 2024-07-29 DIAGNOSIS — N13.8 BPH WITH OBSTRUCTION/LOWER URINARY TRACT SYMPTOMS: Primary | ICD-10-CM

## 2024-07-29 DIAGNOSIS — N52.9 MALE ERECTILE DISORDER: ICD-10-CM

## 2024-07-29 DIAGNOSIS — Q55.61 CURVATURE OF THE PENIS: ICD-10-CM

## 2024-07-29 DIAGNOSIS — M62.89 PELVIC FLOOR DYSFUNCTION: ICD-10-CM

## 2024-07-29 DIAGNOSIS — N40.1 BPH WITH OBSTRUCTION/LOWER URINARY TRACT SYMPTOMS: Primary | ICD-10-CM

## 2024-07-29 LAB
POC APPEARANCE, URINE: CLEAR
POC BILIRUBIN, URINE: NEGATIVE
POC BLOOD, URINE: NEGATIVE
POC COLOR, URINE: YELLOW
POC GLUCOSE, URINE: NEGATIVE MG/DL
POC KETONES, URINE: NEGATIVE MG/DL
POC LEUKOCYTES, URINE: NEGATIVE
POC NITRITE,URINE: NEGATIVE
POC PH, URINE: 5.5 PH
POC PROTEIN, URINE: NEGATIVE MG/DL
POC SPECIFIC GRAVITY, URINE: >=1.03
POC UROBILINOGEN, URINE: 0.2 EU/DL

## 2024-07-29 PROCEDURE — 81002 URINALYSIS NONAUTO W/O SCOPE: CPT | Performed by: UROLOGY

## 2024-07-29 PROCEDURE — 99214 OFFICE O/P EST MOD 30 MIN: CPT | Performed by: UROLOGY

## 2024-07-29 PROCEDURE — 51798 US URINE CAPACITY MEASURE: CPT | Performed by: UROLOGY

## 2024-07-29 PROCEDURE — 51741 ELECTRO-UROFLOWMETRY FIRST: CPT | Performed by: UROLOGY

## 2024-07-29 RX ORDER — TADALAFIL 5 MG/1
TABLET ORAL
Qty: 60 TABLET | Refills: 11 | Status: SHIPPED | OUTPATIENT
Start: 2024-07-29

## 2024-07-29 ASSESSMENT — PAIN SCALES - GENERAL: PAINLEVEL: 0-NO PAIN

## 2024-07-30 ENCOUNTER — APPOINTMENT (OUTPATIENT)
Dept: GASTROENTEROLOGY | Facility: CLINIC | Age: 59
End: 2024-07-30
Payer: COMMERCIAL

## 2024-07-30 NOTE — PROGRESS NOTES
Subjective   Clifford Recinos is a 59 y.o. male with BPH with LUTS and family history of prostate cancer, presenting today to review prostate MRI and assess his response to daily Cialis 5mg. Patient reports significant improvement in his urinary symptoms with daily Cialis. However, patient started antidepressants and his urinary symptoms returned. He now has complaints of urinary frequency, urgency and nocturia x3. Patient has complaints of ED with penile curvature.       LAST VISIT (5/7/2024):  Clifford Recinos is a 58 y.o. male presenting as a new patient today, referred by Dr. Larry due to undetectable PSA. Patient has low energy levels and low libido. His Testosterone is 4.0. He has a history of testicular torsion at age 5. Patient has a family history of prostate cancer. Patient has complaints of bothersome LUTS. He has increased daytime frequency x19, weak urinary stream, sensation of incomplete bladder emptying and nocturia x3. Denies any recent gross hematuria, fevers, chills, urinary retention, intractable flank or abdominal pain, nausea or vomiting.     Past Medical History:   Diagnosis Date    Anemia     Anxiety     Arthritis 2010    BPH (benign prostatic hyperplasia)     Depression     Fracture of fourth toe, left, closed, initial encounter 10/18/2019    Fracture of fourth toe, left, open, with routine healing, subsequent encounter 10/14/2019    Headache 06.21.24    History of blood transfusion     GI bleed   no reaction    Hypertension     Hypogonadism male     Other instability, left knee 05/02/2018    Other instability, left knee    Pain in left knee 05/02/2018    Left knee pain, unspecified chronicity    Pain in thoracic spine 02/21/2022    Thoracic spine pain    Palpitations     PVCs (premature ventricular contractions)     Restless leg syndrome 2010    Scoliosis 2023    Sleep apnea     having home sleep study soon    Unspecified injury of thorax, initial encounter 02/21/2022    Rib injury    Vasovagal  syncope     Visual impairment 2022    Wears glasses     Wedge compression fracture of unspecified thoracic vertebra, initial encounter for closed fracture (Multi) 02/23/2022    Thoracic compression fracture     Past Surgical History:   Procedure Laterality Date    APPENDECTOMY      CHOLECYSTECTOMY      COLONOSCOPY      OTHER SURGICAL HISTORY  02/21/2022    Appendectomy    OTHER SURGICAL HISTORY  02/21/2022    Cholecystectomy     Family History   Problem Relation Name Age of Onset    Other (CABG) Mother Natalia Recinos (bypass)     Heart disease Mother Natalia Recinos (bypass)     Other (cardiac pacemaker) Father Abisai Recinos     Other (malignant neoplasm of prostate) Father Abisai Recinos     Other (sepsis) Father Abisai Recinos     Other (stroke syndrome) Father Abisai Recinos     Depression Father Abisai Grisel     Mental illness Father Abisaikenia Recinos     Other (PTCA) Sister Suzan Wang     Depression Sister Suzan Wang     Heart disease Sister Suzan Wang     Depression Brother Carlitos and Star     Hypertension Brother Carlitos and Star      Current Outpatient Medications   Medication Sig Dispense Refill    amLODIPine (Norvasc) 5 mg tablet Take 1 tablet (5 mg) by mouth once daily. 30 tablet 11    blood-glucose meter (OneTouch Ultra2 Meter) misc USE TO CHECK BLOOD SUGAR 1 each 0    clonazePAM (KlonoPIN) 1 mg tablet Take 1 tablet (1 mg) by mouth 2 times a day as needed for anxiety.      fluticasone (Flonase) 50 mcg/actuation nasal spray USE 1 SPRAY EACH NOSTRIL TWICE DAILY - OR - USE 2 SPRAYS EACH NOSTRIL ONCE DAILY. (Patient taking differently: 2 sprays if needed for allergies.) 48 mL 1    lancets (OneTouch UltraSoft Lancets) misc Check BG as needed for hypoglycemia 100 each 2    OneTouch Ultra Test strip Check BG as needed for symptoms of hypoglycemia 100 strip 2    OneTouch Ultra2 Meter misc USE TO CHECK BLOOD SUGAR      sertraline (Zoloft) 50 mg tablet Take 1 tablet (50 mg) by mouth once daily. 100 tablet 0    tadalafil (Cialis) 5 mg  tablet Can take an additional 3 tablets on days when sexually active 60 tablet 11     No current facility-administered medications for this visit.     Allergies   Allergen Reactions    Ketorolac Anaphylaxis    Ketorolac Tromethamine Anaphylaxis    Pseudoephedrine Other    Wellbutrin [Bupropion Hcl] Anxiety, Other and Headache     urinary incontinence    Sudafed [Pseudoephedrine Hcl] Other     HEART POUNDING    Cefdinir Diarrhea    Levofloxacin Other     Insomnia, heart pounding, gen. shaking, frequency    Nsaids (Non-Steroidal Anti-Inflammatory Drug) GI bleeding    Oxycodone-Acetaminophen Other     HA, angry    Propoxyphene N-Acetaminophen Other     HA, vision change    Doxycycline Rash     Social History     Socioeconomic History    Marital status:      Spouse name: Not on file    Number of children: 1    Years of education: Not on file    Highest education level: Not on file   Occupational History    Occupation: UNEMPLOYED   Tobacco Use    Smoking status: Never     Passive exposure: Never    Smokeless tobacco: Never   Vaping Use    Vaping status: Never Used   Substance and Sexual Activity    Alcohol use: Yes     Comment: On occasion...    Drug use: Never    Sexual activity: Not Currently     Partners: Female     Comment: Currently being treated for ED by Ada Newman MD   Other Topics Concern    Not on file   Social History Narrative    Not on file     Social Determinants of Health     Financial Resource Strain: Low Risk  (4/28/2024)    Overall Financial Resource Strain (CARDIA)     Difficulty of Paying Living Expenses: Not hard at all   Food Insecurity: Not on file   Transportation Needs: No Transportation Needs (4/28/2024)    PRAPARE - Transportation     Lack of Transportation (Medical): No     Lack of Transportation (Non-Medical): No   Physical Activity: Not on file   Stress: Not on file   Social Connections: Not on file   Intimate Partner Violence: Not on file   Housing Stability: Low Risk  (4/28/2024)     Housing Stability Vital Sign     Unable to Pay for Housing in the Last Year: No     Number of Places Lived in the Last Year: 1     Unstable Housing in the Last Year: No       Review of Systems  Pertinent items are noted in HPI.    Objective       Lab Review  Lab Results   Component Value Date    WBC 4.9 06/24/2024    RBC 3.53 (L) 06/24/2024    HGB 11.0 (L) 06/24/2024    HCT 34.8 (L) 06/24/2024     06/24/2024      Lab Results   Component Value Date    BUN 10 06/24/2024    CREATININE 0.67 06/24/2024      PVR 0ml    Uroflow study demonstrated voided volume of 40 and maximal flow rate of 7.6ml/s.     Urine analysis shows negative     Assessment/Plan   Diagnoses and all orders for this visit:  BPH with obstruction/lower urinary tract symptoms  -     Measure post void residual  -     POCT UA (nonautomated) manually resulted  -     tadalafil (Cialis) 5 mg tablet; Can take an additional 3 tablets on days when sexually active  -     Urine flow measurement  Curvature of the penis  Male erectile disorder  Pelvic floor dysfunction      BPH with LUTS       I reviewed prostate MRI from 6/6/2024 which showed a 10.37g prostate with no sign of cancer.     I advised patient to discuss antidepressant medication with PCP.    We will continue daily Cialis and refer patient to PFPT.    2. ED   3.  Penile curvature    We discussed that patient may take up to 20mg of Cialis on days he is sexually active.     We will refer patient to Dr. Oshea for further evaluation.     All questions were answered to the patient's satisfaction. Patient agrees with the plan and wishes to proceed. Follow-up will be scheduled appropriately.       Scribed for Dr. Langley by Portia Virk. I , Dr Langley, have personally reviewed and agreed with the information entered by the Virtual Scribe.

## 2024-08-07 DIAGNOSIS — K44.9 HIATAL HERNIA: Primary | ICD-10-CM

## 2024-08-07 RX ORDER — PANTOPRAZOLE SODIUM 40 MG/1
40 TABLET, DELAYED RELEASE ORAL DAILY PRN
Qty: 30 TABLET | Refills: 0 | Status: SHIPPED | OUTPATIENT
Start: 2024-08-07

## 2024-08-15 ENCOUNTER — APPOINTMENT (OUTPATIENT)
Dept: GASTROENTEROLOGY | Facility: CLINIC | Age: 59
End: 2024-08-15
Payer: COMMERCIAL

## 2024-08-21 DIAGNOSIS — K44.9 HIATAL HERNIA: ICD-10-CM

## 2024-08-23 RX ORDER — PANTOPRAZOLE SODIUM 40 MG/1
40 TABLET, DELAYED RELEASE ORAL DAILY PRN
Qty: 90 TABLET | Refills: 0 | Status: SHIPPED | OUTPATIENT
Start: 2024-08-23

## 2024-09-03 ENCOUNTER — APPOINTMENT (OUTPATIENT)
Dept: UROLOGY | Facility: CLINIC | Age: 59
End: 2024-09-03
Payer: COMMERCIAL

## 2024-09-03 DIAGNOSIS — N52.9 ERECTILE DYSFUNCTION, UNSPECIFIED ERECTILE DYSFUNCTION TYPE: Primary | ICD-10-CM

## 2024-09-03 PROCEDURE — 99204 OFFICE O/P NEW MOD 45 MIN: CPT | Performed by: UROLOGY

## 2024-09-03 PROCEDURE — 1036F TOBACCO NON-USER: CPT | Performed by: UROLOGY

## 2024-09-03 RX ORDER — TADALAFIL 20 MG/1
20 TABLET ORAL AS NEEDED
Qty: 30 TABLET | Refills: 6 | Status: SHIPPED | OUTPATIENT
Start: 2024-09-03

## 2024-09-03 NOTE — PROGRESS NOTES
"HPI:  59 y.o. yo male patient complains of  erectile dysfunction.   Dr. Langley told pt to double cialis dose, pt has not had the chance to try the double dose yet. On antidepressants and BP meds.   Testosterone level low. (Managed by PCP)  Pt has 1 testicle (ruptured hernia as a baby)    Duration: 2-3 years  Rigidity of erections:  'barely'/10  Morning Erections: no  s/p prostatectomy: seeing Korin for BPH, no CA, note reviewed  On nitrates/NTG?: No  Smoker: no  Partner:   Tried PDE5is?: yes  Viagra/sildenafil - response:   Cialis/tadalafil- response: 'not much help'  Tried penile injections: no  Libido/Desire: Good  Have been on Testosterone /anabolic steroids? No, PCP wanted to see sleep apnea dx first before starting on Testosterone  Pain or curvature in penis when erect: Yes, down  Premature or delayed ejaculation:  None      Scrotal US 5/2024:    Status post right orchiectomy.  Diffusely heterogenous and lobulated left testicle.  Limited visualization of the epididymis with a suspected 10 mm  epididymal head cyst.    T 31 in past    Lab Results   Component Value Date    LH 8.2 04/12/2024     Lab Results   Component Value Date    FSH 29.8 04/12/2024     No components found for: \"ESTRADIAL\"  No results found for: \"PSA\"  No components found for: \"CBC\"  Lab Results   Component Value Date    PROLACTIN 6.3 04/12/2024     Lab Results   Component Value Date    HGBA1C 4.8 04/28/2024         PMH:  Past Medical History:   Diagnosis Date    Anemia     Anxiety     Arthritis 2010    BPH (benign prostatic hyperplasia)     Depression     Fracture of fourth toe, left, closed, initial encounter 10/18/2019    Fracture of fourth toe, left, open, with routine healing, subsequent encounter 10/14/2019    Headache 06.21.24    History of blood transfusion     GI bleed   no reaction    Hypertension     Hypogonadism male     Other instability, left knee 05/02/2018    Other instability, left knee    Pain in left knee 05/02/2018    " Left knee pain, unspecified chronicity    Pain in thoracic spine 02/21/2022    Thoracic spine pain    Palpitations     PVCs (premature ventricular contractions)     Restless leg syndrome 2010    Scoliosis 2023    Sleep apnea     having home sleep study soon    Unspecified injury of thorax, initial encounter 02/21/2022    Rib injury    Vasovagal syncope     Visual impairment 2022    Wears glasses     Wedge compression fracture of unspecified thoracic vertebra, initial encounter for closed fracture (Multi) 02/23/2022    Thoracic compression fracture        PSH:  Past Surgical History:   Procedure Laterality Date    APPENDECTOMY      CHOLECYSTECTOMY      COLONOSCOPY      OTHER SURGICAL HISTORY  02/21/2022    Appendectomy    OTHER SURGICAL HISTORY  02/21/2022    Cholecystectomy        Medications:    Current Outpatient Medications:     amLODIPine (Norvasc) 5 mg tablet, Take 1 tablet (5 mg) by mouth once daily., Disp: 30 tablet, Rfl: 11    blood-glucose meter (OneTouch Ultra2 Meter) misc, USE TO CHECK BLOOD SUGAR, Disp: 1 each, Rfl: 0    clonazePAM (KlonoPIN) 1 mg tablet, Take 1 tablet (1 mg) by mouth 2 times a day as needed for anxiety., Disp: , Rfl:     fluticasone (Flonase) 50 mcg/actuation nasal spray, USE 1 SPRAY EACH NOSTRIL TWICE DAILY - OR - USE 2 SPRAYS EACH NOSTRIL ONCE DAILY. (Patient taking differently: 2 sprays if needed for allergies.), Disp: 48 mL, Rfl: 1    lancets (OneTouch UltraSoft Lancets) misc, Check BG as needed for hypoglycemia, Disp: 100 each, Rfl: 2    OneTouch Ultra Test strip, Check BG as needed for symptoms of hypoglycemia, Disp: 100 strip, Rfl: 2    OneTouch Ultra2 Meter misc, USE TO CHECK BLOOD SUGAR, Disp: , Rfl:     pantoprazole (ProtoNix) 40 mg EC tablet, TAKE 1 TABLET (40 MG) BY MOUTH ONCE DAILY AS NEEDED (ACID REFLUX, INDIGESTION). DO NOT CRUSH, CHEW, OR SPLIT., Disp: 90 tablet, Rfl: 0    sertraline (Zoloft) 50 mg tablet, Take 1 tablet (50 mg) by mouth once daily., Disp: 100 tablet,  Rfl: 0    tadalafil (Cialis) 5 mg tablet, Can take an additional 3 tablets on days when sexually active, Disp: 60 tablet, Rfl: 11    Allergy:  Allergies   Allergen Reactions    Ketorolac Anaphylaxis    Ketorolac Tromethamine Anaphylaxis    Pseudoephedrine Other    Wellbutrin [Bupropion Hcl] Anxiety, Other and Headache     urinary incontinence    Sudafed [Pseudoephedrine Hcl] Other     HEART POUNDING    Cefdinir Diarrhea    Levofloxacin Other     Insomnia, heart pounding, gen. shaking, frequency    Nsaids (Non-Steroidal Anti-Inflammatory Drug) GI bleeding    Oxycodone-Acetaminophen Other     HA, angry    Propoxyphene N-Acetaminophen Other     HA, vision change    Doxycycline Rash        Exam  Testicles descended bilaterally, nontender, no masses  Vasa palpable bilaterally  Penis circ'd, no lesions, 2-3cm dorsal plaque noted      Assessment/Plan  #Erectile Dysfunction: I discussed the etiology and different treatment modalities for erectile dysfunction. Specifically, we talked about lifestyle factors like smoking, diet, and exercise. We also discussed ED as a sign of systemic disease, and the contributions of elevated cholesterol and elevated blood sugars on erections.     Continue daily 5 cialis  Trial of Cialis 20mg - medication counseling done. Can start with half tab. Discussed side effects including priapism, headaches, stuffiness, and back pain. Discussed that if he gets an erection >4 hours, he needs to present to the emergency room or risk worsening of his erectile dysfunction long term.   Pt will check with cardiology prior to starting (note reviewed)    #peyronies  -unclear how much curvature as not getting fully erect    #low T  Per patient managed by endocrinology    Fu in 2 months

## 2024-09-09 ENCOUNTER — APPOINTMENT (OUTPATIENT)
Dept: PRIMARY CARE | Facility: CLINIC | Age: 59
End: 2024-09-09
Payer: COMMERCIAL

## 2024-09-09 VITALS
SYSTOLIC BLOOD PRESSURE: 129 MMHG | BODY MASS INDEX: 30.87 KG/M2 | WEIGHT: 220.5 LBS | HEART RATE: 56 BPM | OXYGEN SATURATION: 97 % | DIASTOLIC BLOOD PRESSURE: 73 MMHG | HEIGHT: 71 IN

## 2024-09-09 DIAGNOSIS — F32.A ANXIETY AND DEPRESSION: Primary | ICD-10-CM

## 2024-09-09 DIAGNOSIS — F41.9 ANXIETY AND DEPRESSION: Primary | ICD-10-CM

## 2024-09-09 DIAGNOSIS — R63.4 WEIGHT LOSS: ICD-10-CM

## 2024-09-09 DIAGNOSIS — G47.33 OSA (OBSTRUCTIVE SLEEP APNEA): ICD-10-CM

## 2024-09-09 PROCEDURE — 1036F TOBACCO NON-USER: CPT | Performed by: FAMILY MEDICINE

## 2024-09-09 PROCEDURE — 3008F BODY MASS INDEX DOCD: CPT | Performed by: FAMILY MEDICINE

## 2024-09-09 PROCEDURE — 99214 OFFICE O/P EST MOD 30 MIN: CPT | Performed by: FAMILY MEDICINE

## 2024-09-09 PROCEDURE — 3074F SYST BP LT 130 MM HG: CPT | Performed by: FAMILY MEDICINE

## 2024-09-09 PROCEDURE — 3078F DIAST BP <80 MM HG: CPT | Performed by: FAMILY MEDICINE

## 2024-09-09 RX ORDER — SERTRALINE HYDROCHLORIDE 50 MG/1
100 TABLET, FILM COATED ORAL DAILY
Qty: 200 TABLET | Refills: 0 | Status: SHIPPED | OUTPATIENT
Start: 2024-09-09 | End: 2024-09-09

## 2024-09-09 RX ORDER — SERTRALINE HYDROCHLORIDE 50 MG/1
100 TABLET, FILM COATED ORAL DAILY
Qty: 200 TABLET | Refills: 0 | Status: SHIPPED | OUTPATIENT
Start: 2024-09-09

## 2024-09-09 ASSESSMENT — ENCOUNTER SYMPTOMS
UNEXPECTED WEIGHT CHANGE: 1
SLEEP DISTURBANCE: 1
NERVOUS/ANXIOUS: 1
DIARRHEA: 1
NAUSEA: 1
VOMITING: 1

## 2024-09-09 NOTE — PATIENT INSTRUCTIONS
Please start the Metoprolol, as discussed with your cardiologist.    Try increasing Sertraline to 75 mg then plan to increase to 100 mg after 2 weeks.    Strongly recommend pursuing CPAP therapy.    Please return for a(n) blood pressure, anxiety, and medication follow-up appointment in 3-4 months, earlier if any question or concern. Please schedule additional problem-focused appointment(s) to address additional problem(s).    Avoid taking Biotin (a vitamin, shows up particularly in hair/nail supplements) for a week prior to any blood tests, as it can interfere with certain results. Fasting for labs means 12 hours, nothing to eat or drink, except water and medications, unless directed otherwise.    For assistance with scheduling referrals or consultations, please call [phone]. For laboratory, radiology, and other tests, please call [phone] ([phone] for pediatrics). Please review prescription inserts and published information for possible adverse effects of all medications. Return after testing or consultation to review results and recommendations, if symptoms persist, change, worsen, or return, or if you have any question or concern. If you do not get results within 7-10 days, or you have any question or concern, please send a message, call the office ([phone]), or return to the office for a follow-up appointment. For non-emergencies, you may call the office. For emergencies, call 9-1-1 or go to the nearest Emergency Department. Please schedule additional appointment(s) to address concern(s) not addressed today.    In general, results are not released or discussed over the telephone, but at an appointment or via  [email or web address]. Results of tests done through [facility] are released via  [email or web address] (see below).  [email or web address]   [email or web address] support line: [phone]

## 2024-09-09 NOTE — ASSESSMENT & PLAN NOTE
Increase Sertraline.  Orders:    Follow Up In Primary Care - Established    sertraline (Zoloft) 50 mg tablet; Take 2 tablets (100 mg) by mouth once daily.

## 2024-09-09 NOTE — PROGRESS NOTES
"Subjective   Patient ID: Clifford Recinos is a 59 y.o. male who presents for Follow-up (Pt presents with spouse for check up, c/o losing weight, having off days, high anxiety, htn higher in morning, flutters, IBS flare up, family hx heart disease. BL).  HPI Historian(s): Self and Wife    c/o weight loss, decrease in appetite since starting Zoloft. Had vomiting and really bad bout of diarrhea with high BP. c/o occasional chest pain, and he confidently associates this with his anxiety.    Review of Systems   Constitutional:  Positive for unexpected weight change.   Gastrointestinal:  Positive for diarrhea, nausea and vomiting.   Psychiatric/Behavioral:  Positive for sleep disturbance. The patient is nervous/anxious.    All other systems reviewed and are negative.      Patient Care Team:  Lacho Hart DO as PCP - General (Family Medicine)  LUKE Quesada as Nurse Practitioner (Cardiology)  Ada Langley MD as Surgeon (Urology)    Objective   /73   Pulse 56   Ht 1.803 m (5' 11\")   Wt 100 kg (220 lb 8 oz)   SpO2 97%   BMI 30.75 kg/m²         Physical Exam  Vitals and nursing note reviewed.   Constitutional:       General: He is not in acute distress.     Appearance: Normal appearance.      Comments: No assistive device presently being used.   HENT:      Head: Normocephalic and atraumatic.   Eyes:      General: No scleral icterus.     Extraocular Movements: Extraocular movements intact.      Conjunctiva/sclera: Conjunctivae normal.   Pulmonary:      Effort: Pulmonary effort is normal. No respiratory distress.   Skin:     General: Skin is warm and dry.      Coloration: Skin is not jaundiced.   Neurological:      Mental Status: He is alert and oriented to person, place, and time. Mental status is at baseline.   Psychiatric:         Mood and Affect: Affect normal. Mood is anxious.         Behavior: Behavior normal.         Assessment & Plan  Anxiety and depression  Increase Sertraline.  Orders:    Follow " Up In Primary Care - Established    sertraline (Zoloft) 50 mg tablet; Take 2 tablets (100 mg) by mouth once daily.    RADHA (obstructive sleep apnea)  CPAP.       Weight loss  Secondary to anxiety +/- recent illness.

## 2024-09-10 ENCOUNTER — APPOINTMENT (OUTPATIENT)
Dept: UROLOGY | Facility: CLINIC | Age: 59
End: 2024-09-10
Payer: COMMERCIAL

## 2024-09-13 DIAGNOSIS — R00.2 PALPITATIONS: ICD-10-CM

## 2024-09-13 RX ORDER — METOPROLOL SUCCINATE 25 MG/1
25 TABLET, EXTENDED RELEASE ORAL DAILY
Qty: 30 TABLET | Refills: 11 | Status: SHIPPED | OUTPATIENT
Start: 2024-09-13 | End: 2025-09-13

## 2024-09-23 ENCOUNTER — TELEPHONE (OUTPATIENT)
Dept: PRIMARY CARE | Facility: CLINIC | Age: 59
End: 2024-09-23
Payer: COMMERCIAL

## 2024-09-23 NOTE — TELEPHONE ENCOUNTER
Spouse states that the pt is experiencing side effects regarding his medication, sertraline 50 mg. States that he is experiencing diarrhea and doesn't feel it is helping with the symptoms he is taking it for. Requesting a call.

## 2024-09-24 NOTE — TELEPHONE ENCOUNTER
"Wife notified will back down on the dose, but he has had this diarrhea she says for at least 5 or 6 weeks thinks he ate something wrong that started it, and it is green in color has to take Kaopectate all the time they tried the \"rice\" diet without relief She wonders if he needs an ABX? Should we do some stool studies or other labs? Please advise  "

## 2024-09-25 ENCOUNTER — APPOINTMENT (OUTPATIENT)
Dept: RADIOLOGY | Facility: HOSPITAL | Age: 59
End: 2024-09-25
Payer: COMMERCIAL

## 2024-09-25 ENCOUNTER — APPOINTMENT (OUTPATIENT)
Dept: CARDIOLOGY | Facility: HOSPITAL | Age: 59
End: 2024-09-25
Payer: COMMERCIAL

## 2024-09-25 ENCOUNTER — HOSPITAL ENCOUNTER (EMERGENCY)
Facility: HOSPITAL | Age: 59
Discharge: HOME | End: 2024-09-25
Payer: COMMERCIAL

## 2024-09-25 ENCOUNTER — OFFICE VISIT (OUTPATIENT)
Dept: PRIMARY CARE | Facility: CLINIC | Age: 59
End: 2024-09-25
Payer: COMMERCIAL

## 2024-09-25 VITALS
BODY MASS INDEX: 30.38 KG/M2 | DIASTOLIC BLOOD PRESSURE: 75 MMHG | SYSTOLIC BLOOD PRESSURE: 152 MMHG | RESPIRATION RATE: 11 BRPM | HEIGHT: 71 IN | TEMPERATURE: 97.9 F | WEIGHT: 217 LBS | OXYGEN SATURATION: 97 % | HEART RATE: 52 BPM

## 2024-09-25 VITALS
TEMPERATURE: 98.1 F | HEART RATE: 40 BPM | HEIGHT: 71 IN | SYSTOLIC BLOOD PRESSURE: 153 MMHG | OXYGEN SATURATION: 96 % | WEIGHT: 217.38 LBS | DIASTOLIC BLOOD PRESSURE: 64 MMHG | BODY MASS INDEX: 30.43 KG/M2

## 2024-09-25 DIAGNOSIS — K58.0 IRRITABLE BOWEL SYNDROME WITH DIARRHEA: Primary | ICD-10-CM

## 2024-09-25 DIAGNOSIS — K58.0 IRRITABLE BOWEL SYNDROME WITH DIARRHEA: ICD-10-CM

## 2024-09-25 DIAGNOSIS — I49.3 FREQUENT PVCS: ICD-10-CM

## 2024-09-25 DIAGNOSIS — F32.A ANXIETY AND DEPRESSION: ICD-10-CM

## 2024-09-25 DIAGNOSIS — F41.9 ANXIETY: ICD-10-CM

## 2024-09-25 DIAGNOSIS — R00.1 BRADYCARDIA: Primary | ICD-10-CM

## 2024-09-25 DIAGNOSIS — F41.9 ANXIETY AND DEPRESSION: ICD-10-CM

## 2024-09-25 LAB
ALBUMIN SERPL BCP-MCNC: 4.8 G/DL (ref 3.4–5)
ALP SERPL-CCNC: 47 U/L (ref 33–120)
ALT SERPL W P-5'-P-CCNC: 16 U/L (ref 10–52)
ANION GAP SERPL CALC-SCNC: 14 MMOL/L (ref 10–20)
AST SERPL W P-5'-P-CCNC: 27 U/L (ref 9–39)
BASOPHILS # BLD AUTO: 0.03 X10*3/UL (ref 0–0.1)
BASOPHILS NFR BLD AUTO: 0.5 %
BILIRUB SERPL-MCNC: 1.1 MG/DL (ref 0–1.2)
BUN SERPL-MCNC: 11 MG/DL (ref 6–23)
CALCIUM SERPL-MCNC: 9.6 MG/DL (ref 8.6–10.3)
CARDIAC TROPONIN I PNL SERPL HS: 7 NG/L (ref 0–20)
CARDIAC TROPONIN I PNL SERPL HS: 8 NG/L (ref 0–20)
CHLORIDE SERPL-SCNC: 105 MMOL/L (ref 98–107)
CO2 SERPL-SCNC: 25 MMOL/L (ref 21–32)
CREAT SERPL-MCNC: 0.56 MG/DL (ref 0.5–1.3)
EGFRCR SERPLBLD CKD-EPI 2021: >90 ML/MIN/1.73M*2
EOSINOPHIL # BLD AUTO: 0.05 X10*3/UL (ref 0–0.7)
EOSINOPHIL NFR BLD AUTO: 0.8 %
ERYTHROCYTE [DISTWIDTH] IN BLOOD BY AUTOMATED COUNT: 17.1 % (ref 11.5–14.5)
GLUCOSE SERPL-MCNC: 129 MG/DL (ref 74–99)
HCT VFR BLD AUTO: 32.7 % (ref 41–52)
HGB BLD-MCNC: 11.2 G/DL (ref 13.5–17.5)
IMM GRANULOCYTES # BLD AUTO: 0.02 X10*3/UL (ref 0–0.7)
IMM GRANULOCYTES NFR BLD AUTO: 0.3 % (ref 0–0.9)
LYMPHOCYTES # BLD AUTO: 0.83 X10*3/UL (ref 1.2–4.8)
LYMPHOCYTES NFR BLD AUTO: 13.5 %
MAGNESIUM SERPL-MCNC: 2.19 MG/DL (ref 1.6–2.4)
MCH RBC QN AUTO: 33.6 PG (ref 26–34)
MCHC RBC AUTO-ENTMCNC: 34.3 G/DL (ref 32–36)
MCV RBC AUTO: 98 FL (ref 80–100)
MONOCYTES # BLD AUTO: 0.39 X10*3/UL (ref 0.1–1)
MONOCYTES NFR BLD AUTO: 6.3 %
NEUTROPHILS # BLD AUTO: 4.83 X10*3/UL (ref 1.2–7.7)
NEUTROPHILS NFR BLD AUTO: 78.6 %
NRBC BLD-RTO: 0 /100 WBCS (ref 0–0)
PLATELET # BLD AUTO: 417 X10*3/UL (ref 150–450)
POTASSIUM SERPL-SCNC: 4.8 MMOL/L (ref 3.5–5.3)
PROT SERPL-MCNC: 7.6 G/DL (ref 6.4–8.2)
RBC # BLD AUTO: 3.33 X10*6/UL (ref 4.5–5.9)
SODIUM SERPL-SCNC: 139 MMOL/L (ref 136–145)
WBC # BLD AUTO: 6.2 X10*3/UL (ref 4.4–11.3)

## 2024-09-25 PROCEDURE — 36415 COLL VENOUS BLD VENIPUNCTURE: CPT | Performed by: PHYSICIAN ASSISTANT

## 2024-09-25 PROCEDURE — 93005 ELECTROCARDIOGRAM TRACING: CPT

## 2024-09-25 PROCEDURE — 3008F BODY MASS INDEX DOCD: CPT | Performed by: FAMILY MEDICINE

## 2024-09-25 PROCEDURE — 83735 ASSAY OF MAGNESIUM: CPT | Performed by: PHYSICIAN ASSISTANT

## 2024-09-25 PROCEDURE — 84484 ASSAY OF TROPONIN QUANT: CPT | Performed by: PHYSICIAN ASSISTANT

## 2024-09-25 PROCEDURE — 99285 EMERGENCY DEPT VISIT HI MDM: CPT | Mod: 25

## 2024-09-25 PROCEDURE — 99214 OFFICE O/P EST MOD 30 MIN: CPT | Performed by: FAMILY MEDICINE

## 2024-09-25 PROCEDURE — 3078F DIAST BP <80 MM HG: CPT | Performed by: FAMILY MEDICINE

## 2024-09-25 PROCEDURE — 85025 COMPLETE CBC W/AUTO DIFF WBC: CPT | Performed by: PHYSICIAN ASSISTANT

## 2024-09-25 PROCEDURE — 3077F SYST BP >= 140 MM HG: CPT | Performed by: FAMILY MEDICINE

## 2024-09-25 PROCEDURE — 74177 CT ABD & PELVIS W/CONTRAST: CPT | Performed by: RADIOLOGY

## 2024-09-25 PROCEDURE — 71046 X-RAY EXAM CHEST 2 VIEWS: CPT | Performed by: SURGERY

## 2024-09-25 PROCEDURE — 2550000001 HC RX 255 CONTRASTS: Performed by: PHYSICIAN ASSISTANT

## 2024-09-25 PROCEDURE — 1036F TOBACCO NON-USER: CPT | Performed by: FAMILY MEDICINE

## 2024-09-25 PROCEDURE — 74177 CT ABD & PELVIS W/CONTRAST: CPT

## 2024-09-25 PROCEDURE — 80053 COMPREHEN METABOLIC PANEL: CPT | Performed by: PHYSICIAN ASSISTANT

## 2024-09-25 PROCEDURE — 71046 X-RAY EXAM CHEST 2 VIEWS: CPT

## 2024-09-25 RX ORDER — DICYCLOMINE HYDROCHLORIDE 20 MG/1
20 TABLET ORAL
Qty: 56 TABLET | Refills: 0 | Status: SHIPPED | OUTPATIENT
Start: 2024-09-25 | End: 2024-10-09

## 2024-09-25 RX ORDER — SERTRALINE HCL 100 MG
100 TABLET ORAL DAILY
Qty: 100 TABLET | Refills: 1 | Status: SHIPPED | OUTPATIENT
Start: 2024-09-25 | End: 2024-09-27

## 2024-09-25 ASSESSMENT — LIFESTYLE VARIABLES
EVER FELT BAD OR GUILTY ABOUT YOUR DRINKING: NO
HAVE PEOPLE ANNOYED YOU BY CRITICIZING YOUR DRINKING: NO
TOTAL SCORE: 0
HAVE YOU EVER FELT YOU SHOULD CUT DOWN ON YOUR DRINKING: NO
EVER HAD A DRINK FIRST THING IN THE MORNING TO STEADY YOUR NERVES TO GET RID OF A HANGOVER: NO

## 2024-09-25 ASSESSMENT — HEART SCORE
RISK FACTORS: 1-2 RISK FACTORS
HEART SCORE: 2
HISTORY: SLIGHTLY SUSPICIOUS
ECG: NORMAL
TROPONIN: LESS THAN OR EQUAL TO NORMAL LIMIT
AGE: 45-64

## 2024-09-25 ASSESSMENT — ENCOUNTER SYMPTOMS
WEAKNESS: 1
DIAPHORESIS: 1
ABDOMINAL PAIN: 1
DIARRHEA: 1
TREMORS: 1
FATIGUE: 1
CHILLS: 0
SHORTNESS OF BREATH: 1
VOMITING: 0
BLOOD IN STOOL: 0
NAUSEA: 1
PALPITATIONS: 1
LIGHT-HEADEDNESS: 1
DIZZINESS: 1
FEVER: 0

## 2024-09-25 ASSESSMENT — PAIN - FUNCTIONAL ASSESSMENT: PAIN_FUNCTIONAL_ASSESSMENT: 0-10

## 2024-09-25 ASSESSMENT — PAIN SCALES - GENERAL: PAINLEVEL_OUTOF10: 0 - NO PAIN

## 2024-09-25 NOTE — PATIENT INSTRUCTIONS
Start the branded Zoloft at 50 mg (half tablet).    Please return for a(n) anxiety, depression, and medication follow-up appointment in 3 months, earlier if any question or concern. Please schedule additional problem-focused appointment(s) to address additional problem(s).    Avoid taking Biotin (a vitamin, shows up particularly in hair/nail supplements) for a week prior to any blood tests, as it can interfere with certain results. Fasting for labs means 12 hours, nothing to eat or drink, except water and medications, unless directed otherwise.    For assistance with scheduling referrals or consultations, please call [phone]. For laboratory, radiology, and other tests, please call [phone] ([phone] for pediatrics). Please review prescription inserts and published information for possible adverse effects of all medications. Return after testing or consultation to review results and recommendations, if symptoms persist, change, worsen, or return, or if you have any question or concern. If you do not get results within 7-10 days, or you have any question or concern, please send a message, call the office ([phone]), or return to the office for a follow-up appointment. For non-emergencies, you may call the office. For emergencies, call 9-1-1 or go to the nearest Emergency Department. Please schedule additional appointment(s) to address concern(s) not addressed today. An annual Wellness visit is strongly recommended. A Wellness visit should be dedicated to addressing routine health maintenance matters (e.g., cancer screenings, cardiovascular screening, etc.). Problem-focused visits, typically prompted by symptoms or specific concerns, are usually conducted separately, particularly if multiple or complex problems need to be addressed.    In general, results are not released or discussed over the telephone, but at an appointment or via [facility]. Results of tests done through [facility] are  released via  [email or web address] (see below).  [email or web address]   [email or web address] support line: [phone]

## 2024-09-25 NOTE — PROGRESS NOTES
"Subjective   Patient ID: Clifford Recinos is a 59 y.o. male h/o frequent PVCs, anxiety, who presents for Insomnia (Pt presents with spouse c/o just not feeling well diarrhea, insomnia, stomach upset, sweating, shaking.BL).  HPI Historian(s): Self and Wife    c/o anxiety, chest pain, diarrhea, insomnia, exhausted. Feels he's staying well hydrated. Started about 2 weeks ago. Worsening. Diarrhea started when he restarted Sertraline 1-2 months ago. All these symptoms seemed to increase after increasing Sertraline.  Currently does c/o chest pain, lightheadedness, weak.    Feels faint, dizzy, lightheaded all the time. Worse when he takes the dog out to walk.    Review of Systems   Constitutional:  Positive for diaphoresis and fatigue. Negative for chills and fever.   Respiratory:  Positive for shortness of breath.    Cardiovascular:  Positive for chest pain and palpitations. Negative for leg swelling.   Gastrointestinal:  Positive for abdominal pain, diarrhea and nausea. Negative for blood in stool and vomiting.   Neurological:  Positive for dizziness, tremors, weakness and light-headedness (especially on exertion, and a fall without syncope). Negative for syncope.     No LMP for male patient.     Patient Care Team:  Lacho Hart DO as PCP - General (Family Medicine)  LUKE Quesada as Nurse Practitioner (Cardiology)  Ada Langley MD as Surgeon (Urology)    Objective   /64   Pulse (!) 40 Comment: left radial and apical  Temp 36.7 °C (98.1 °F)   Ht 1.803 m (5' 11\")   Wt 98.6 kg (217 lb 6 oz)   SpO2 96%   BMI 30.32 kg/m²         Physical Exam  Vitals and nursing note reviewed.   Constitutional:       General: He is not in acute distress.     Appearance: Normal appearance. He is not diaphoretic.      Comments: No assistive device presently being used.   HENT:      Head: Normocephalic and atraumatic.   Eyes:      General: No scleral icterus.     Extraocular Movements: Extraocular movements intact.      " Conjunctiva/sclera: Conjunctivae normal.   Cardiovascular:      Rate and Rhythm: Bradycardia present. Rhythm irregular.      Heart sounds: Normal heart sounds.   Pulmonary:      Effort: Pulmonary effort is normal. No respiratory distress.      Breath sounds: Normal breath sounds. No wheezing, rhonchi or rales.   Abdominal:      General: Bowel sounds are normal. There is no distension.      Palpations: Abdomen is soft. There is no mass.      Tenderness: There is abdominal tenderness (LLQ). There is no guarding or rebound.   Musculoskeletal:      Right lower leg: No edema.      Left lower leg: No edema.   Skin:     General: Skin is warm and dry.      Coloration: Skin is not jaundiced.   Neurological:      General: No focal deficit present.      Mental Status: He is alert and oriented to person, place, and time. Mental status is at baseline.      Motor: Tremor (head) present.   Psychiatric:         Mood and Affect: Mood is anxious.         Behavior: Behavior normal.         Thought Content: Thought content normal.         Assessment & Plan  Bradycardia  Possibly secondary to recent new Metoprolol. Symptomatic (lightheadedness, blurriness, chest pain). Referred to ER, declines EMS transport, wife will drive him. Provided wheelchair to get to his car.       Anxiety and depression  Diarrhea, increased anxiety, insomnia, and possibly even chest pain secondary to generic Sertraline (happened previously). Try going back to branded Zoloft (previously tolerated). Start at 50 mg (half tab 100 mg).  Orders:    Zoloft 100 mg tablet; Take 1 tablet (100 mg) by mouth once daily.    Frequent PVCs  Likely explanation of irregular rhythm on auscultation and palpation of radial pulse. DDx: atrial fibrillation.       Irritable bowel syndrome with diarrhea  Likely exacerbation related to generic Sertraline.

## 2024-09-25 NOTE — ED TRIAGE NOTES
Patient recently had a med change, for the past few days he has been c/o generalized weakness, nausea, diarrhea, poor PO intake, palpitations and dehydration. Patient was seen by his PCP today and sent to the ED.

## 2024-09-25 NOTE — ASSESSMENT & PLAN NOTE
Diarrhea, increased anxiety, insomnia, and possibly even chest pain secondary to generic Sertraline (happened previously). Try going back to branded Zoloft (previously tolerated). Start at 50 mg (half tab 100 mg).  Orders:    Zoloft 100 mg tablet; Take 1 tablet (100 mg) by mouth once daily.

## 2024-09-25 NOTE — ED PROVIDER NOTES
HPI   Chief Complaint   Patient presents with    Weakness, Gen       History of present illness:  59-year-old male presents the emergency room for complaints of multiple complaints.  The patient states for the past couple months he has been having generalized anxiety generalized weakness and states that he has been having diarrhea and states he is having 2-3 bowel movements a day.  He states he has diffuse abdominal cramping as well and is also been having a dull pressure in his chest.  He states the abdominal pain and diarrhea has been going on for the past month and he states that his chest pain is also ongoing for the past month but he states is more of a dull cramping pain that comes and goes.  He states he just feels like he is not acting any better want to be evaluated.  He states that he was recently switched from commercial Zoloft to generic Zoloft a few months ago due to insurance issues and he states that most of his symptoms began after this change and he believes as a attributed to the Zoloft.  He states that he has no other symptoms this time denies any prior heart history in himself or in his family.  He denies any other symptoms at this time occluding any shortness of breath or vomiting.    Social history: Negative for alcohol and drug use.    Review of systems:   Gen.: No weight loss, fever.   Eyes: No vision loss, double vision  ENT: No pharyngitis, neck pain  Cardiac: No  palpitations, syncope  Pulmonary: No shortness of breath, cough, hemoptysis.   Heme/lymph: No swollen glands, fever, bleeding.   GI: No  melena, hematemesis, hematochezia, nausea, vomiting   : No discharge, dysuria, frequency, urgency, hematuria.   Musculoskeletal: No limb pain, joint pain, joint swelling.   Skin: No rashes.   Review of systems is otherwise negative unless stated above or in history of present illness.      Physical exam:  General: Vitals noted, no distress. Afebrile.   EENT: No lymphadenopathy  appreciated  Cardiac: Regular, rate, rhythm, no murmur.   Pulmonary: Lungs clear bilaterally with good aeration. No adventitious breath sounds.   Abdomen: Soft, nonsurgical. Nontender. No peritoneal signs. Normoactive bowel sounds.   Extremities: No peripheral edema.   Skin: No rash.   Neuro: No focal neurologic deficits      Medical decision making:   Testing: CBC CMP troponin x 2 magnesium level: No acute findings except for slight anemia noted, CT scan abdomen pelvis with contrast showed concerns for possible early cirrhosis but no other acute findings as interpreted by radiology, chest x-ray is unremarkable as interpreted by radiology      EKG taken on September 25, 2024 at 1847 shows sinus bradycardia 49 no STEMI no T wave inversion normal axis      Plan: Home-going.  Discussed differential. Will follow-up with the primary physician in the next 2-3 days. Return if worse. They understand return precautions and discharge instructions. Patient and family/friend/caregiver are in agreement with this plan. 59-year-old male presents the emergency room for complaints of multiple complaints.  The patient states for the past couple months he has been having generalized anxiety generalized weakness and states that he has been having diarrhea and states he is having 2-3 bowel movements a day.  He states he has diffuse abdominal cramping as well and is also been having a dull pressure in his chest.  He states the abdominal pain and diarrhea has been going on for the past month and he states that his chest pain is also ongoing for the past month but he states is more of a dull cramping pain that comes and goes.  He states he just feels like he is not acting any better want to be evaluated.  He states that he was recently switched from commercial Zoloft to generic Zoloft a few months ago due to insurance issues and he states that most of his symptoms began after this change and he believes as a attributed to the Zoloft.  He  states that he has no other symptoms this time denies any prior heart history in himself or in his family.  He denies any other symptoms at this time occluding any shortness of breath or vomiting.  Lungs were clear to auscultation throughout normal S1-S2 heart sounds appreciated no pain to palpation across the abdomen.  Explained to the patient that believe these most likely suffering from IBS with diarrhea and possible anxiety.  Explained to him that be important for him to follow-up closely with his primary care doctor to see if he can get his medication sorted out.  I explained to him the cirrhosis findings as well and need to follow-up with his primary.  He states he has been aware of this for a while.  He states he has no other symptoms at this time as well upon reexamination.  I explained to him that I would be sending him home a short course of Bentyl.  Impression:   1.  IBS with diarrhea  2.  Chest pain          History provided by:  Patient   used: No            Patient History   Past Medical History:   Diagnosis Date    Anemia     Anxiety     Arthritis 2010    BPH (benign prostatic hyperplasia)     Depression     Fracture of fourth toe, left, closed, initial encounter 10/18/2019    Fracture of fourth toe, left, open, with routine healing, subsequent encounter 10/14/2019    Headache 06.21.24    History of blood transfusion     GI bleed   no reaction    Hypertension     Hypogonadism male     Other instability, left knee 05/02/2018    Other instability, left knee    Pain in left knee 05/02/2018    Left knee pain, unspecified chronicity    Pain in thoracic spine 02/21/2022    Thoracic spine pain    Palpitations     PVCs (premature ventricular contractions)     Restless leg syndrome 2010    Scoliosis 2023    Sleep apnea     having home sleep study soon    Unspecified injury of thorax, initial encounter 02/21/2022    Rib injury    Vasovagal syncope     Visual impairment 2022    Wears glasses      Wedge compression fracture of unspecified thoracic vertebra, initial encounter for closed fracture (Multi) 02/23/2022    Thoracic compression fracture     Past Surgical History:   Procedure Laterality Date    APPENDECTOMY      CHOLECYSTECTOMY      COLONOSCOPY      OTHER SURGICAL HISTORY  02/21/2022    Appendectomy    OTHER SURGICAL HISTORY  02/21/2022    Cholecystectomy     Family History   Problem Relation Name Age of Onset    Other (CABG) Mother Natalia Recinos (bypass)     Heart disease Mother Natalia Recinos (bypass)     Other (cardiac pacemaker) Father Abisai Recinos     Other (malignant neoplasm of prostate) Father Abisai Recinos     Other (sepsis) Father Abisai Recinos     Other (stroke syndrome) Father Abisai Recinos     Depression Father Abisai Recinos     Mental illness Father Abisai Recinos     Other (PTCA) Sister Suzan Wang     Depression Sister Suzan Wang     Heart disease Sister Suzan Wang     Depression Brother Mila     Hypertension Brother Carlitos and Star      Social History     Tobacco Use    Smoking status: Never     Passive exposure: Never    Smokeless tobacco: Never   Vaping Use    Vaping status: Never Used   Substance Use Topics    Alcohol use: Yes     Comment: On occasion...    Drug use: Never       Physical Exam   ED Triage Vitals [09/25/24 1725]   Temperature Heart Rate Respirations BP   36.6 °C (97.9 °F) 71 18 161/64      Pulse Ox Temp Source Heart Rate Source Patient Position   99 % Temporal Monitor --      BP Location FiO2 (%)     -- --       Physical Exam      ED Course & MDM   Diagnoses as of 09/25/24 1941   Irritable bowel syndrome with diarrhea   Anxiety                 No data recorded     Mary Coma Scale Score: 15 (09/25/24 1726 : Micheal Snell RN) HEART Score: 2 (09/25/24 1737 : Isai Hartley PA-C)                         Medical Decision Making      Procedure  Procedures     Isai Hartley PA-C  09/25/24 1946       Isai Hartley PA-C  09/26/24 0010

## 2024-09-26 ENCOUNTER — TELEPHONE (OUTPATIENT)
Dept: PRIMARY CARE | Facility: CLINIC | Age: 59
End: 2024-09-26

## 2024-09-26 DIAGNOSIS — F41.9 ANXIETY AND DEPRESSION: ICD-10-CM

## 2024-09-26 DIAGNOSIS — F32.A ANXIETY AND DEPRESSION: ICD-10-CM

## 2024-09-26 LAB
ATRIAL RATE: 49 BPM
ATRIAL RATE: 58 BPM
P AXIS: 65 DEGREES
P AXIS: 74 DEGREES
P OFFSET: 169 MS
P OFFSET: 172 MS
P ONSET: 124 MS
P ONSET: 126 MS
PR INTERVAL: 194 MS
PR INTERVAL: 198 MS
Q ONSET: 223 MS
Q ONSET: 223 MS
QRS COUNT: 10 BEATS
QRS COUNT: 8 BEATS
QRS DURATION: 82 MS
QRS DURATION: 84 MS
QT INTERVAL: 426 MS
QT INTERVAL: 468 MS
QTC CALCULATION(BAZETT): 418 MS
QTC CALCULATION(BAZETT): 422 MS
QTC FREDERICIA: 421 MS
QTC FREDERICIA: 437 MS
R AXIS: 10 DEGREES
R AXIS: 13 DEGREES
T AXIS: 46 DEGREES
T AXIS: 58 DEGREES
T OFFSET: 436 MS
T OFFSET: 457 MS
VENTRICULAR RATE: 49 BPM
VENTRICULAR RATE: 58 BPM

## 2024-09-26 NOTE — TELEPHONE ENCOUNTER
Wife calling the the Ins kicked out the Zoloft OLI stating they need an appeal to be done. He has lexapro at home should he take that while he is waiting for the appeal to be done? The pharm states they faxed over paperwork to us. Please call them.

## 2024-09-27 RX ORDER — SERTRALINE HYDROCHLORIDE 100 MG/1
100 TABLET, FILM COATED ORAL DAILY
Qty: 100 TABLET | Refills: 1 | Status: SHIPPED | OUTPATIENT
Start: 2024-09-27

## 2024-10-10 ENCOUNTER — OFFICE VISIT (OUTPATIENT)
Dept: CARDIOLOGY | Facility: HOSPITAL | Age: 59
End: 2024-10-10
Payer: COMMERCIAL

## 2024-10-10 VITALS
BODY MASS INDEX: 29.36 KG/M2 | WEIGHT: 210.54 LBS | DIASTOLIC BLOOD PRESSURE: 66 MMHG | SYSTOLIC BLOOD PRESSURE: 128 MMHG | HEART RATE: 73 BPM | OXYGEN SATURATION: 97 %

## 2024-10-10 DIAGNOSIS — I49.3 PVC (PREMATURE VENTRICULAR CONTRACTION): ICD-10-CM

## 2024-10-10 DIAGNOSIS — I10 ESSENTIAL (PRIMARY) HYPERTENSION: ICD-10-CM

## 2024-10-10 DIAGNOSIS — R00.2 PALPITATIONS: Primary | ICD-10-CM

## 2024-10-10 PROCEDURE — 3074F SYST BP LT 130 MM HG: CPT | Performed by: NURSE PRACTITIONER

## 2024-10-10 PROCEDURE — 99214 OFFICE O/P EST MOD 30 MIN: CPT | Performed by: NURSE PRACTITIONER

## 2024-10-10 PROCEDURE — 3078F DIAST BP <80 MM HG: CPT | Performed by: NURSE PRACTITIONER

## 2024-10-10 RX ORDER — PROPRANOLOL HYDROCHLORIDE 20 MG/1
20 TABLET ORAL 2 TIMES DAILY
Qty: 60 TABLET | Refills: 3 | Status: SHIPPED | OUTPATIENT
Start: 2024-10-10 | End: 2025-10-10

## 2024-10-10 RX ORDER — AMLODIPINE BESYLATE 10 MG/1
10 TABLET ORAL DAILY
Qty: 90 TABLET | Refills: 3 | Status: SHIPPED | OUTPATIENT
Start: 2024-10-10 | End: 2025-10-10

## 2024-10-10 RX ORDER — ESCITALOPRAM OXALATE 10 MG/1
10 TABLET ORAL DAILY
COMMUNITY

## 2024-10-11 NOTE — PROGRESS NOTES
Chief Complaint:   Follow up      History Of Present Illness:    Clifford Recinos is a 59 y.o. male  with h/o anxiety, depression, BPH, htn, and PVCs presenting today for follow up.  Having difficulty stabilizing his anxiety.  Feels anxious all the time with intermittent tremors and heart racing palpitations.  Goes for walks most days-- feels SOB with walking with associated palpitations and occasional chest discomfort.  Never feels like he has to stop walking-- actually feels better if he keeps walking.  Attempted using diltiazem for PVC burden reduction however developed severe abdominal cramping and diarrhea after starting diltiazem with blood stools.  Stopped diltiazem-- saw GI, was anemic as well.  EGD/colonoscopy in June with mall polyps, but no acute pathology.  Symptoms improved after stopping diltiazem.  Started on Toprol XL 25mg daily and states his palpitations became worse so he stopped taking it.  Exercise stress in February shows frequent PVCs with exertion. Zio monitor in May showed 1.5% PVC burden-- patient believes they are occurring more often now with daily symptoms.  Has lost 40lbs over the past year he states due to severe debilitating anxiety as well as multiple intolerances to medications causing abdominal cramping and diarrhea.        Last Recorded Vitals:  Vitals:    10/10/24 1421   BP: 128/66   Pulse: 73   SpO2: 97%   Weight: 95.5 kg (210 lb 8.6 oz)       Past Medical History:  He has a past medical history of Anemia, Anxiety, Arthritis (2010), BPH (benign prostatic hyperplasia), Depression, Fracture of fourth toe, left, closed, initial encounter (10/18/2019), Fracture of fourth toe, left, open, with routine healing, subsequent encounter (10/14/2019), Headache (06.21.24), History of blood transfusion, Hypertension, Hypogonadism male, Other instability, left knee (05/02/2018), Pain in left knee (05/02/2018), Pain in thoracic spine (02/21/2022), Palpitations, PVCs (premature ventricular  contractions), Restless leg syndrome (2010), Scoliosis (2023), Sleep apnea, Unspecified injury of thorax, initial encounter (02/21/2022), Vasovagal syncope, Visual impairment (2022), Wears glasses, and Wedge compression fracture of unspecified thoracic vertebra, initial encounter for closed fracture (Multi) (02/23/2022).    Past Surgical History:  He has a past surgical history that includes Other surgical history (02/21/2022); Other surgical history (02/21/2022); Colonoscopy; Appendectomy; and Cholecystectomy.      Social History:  He reports that he has never smoked. He has never been exposed to tobacco smoke. He has never used smokeless tobacco. He reports current alcohol use. He reports that he does not use drugs.    Family History:  Family History   Problem Relation Name Age of Onset    Other (CABG) Mother Natalia Recinos (bypass)     Heart disease Mother Natalia Recinos (bypass)     Other (cardiac pacemaker) Father Abisai Recinos     Other (malignant neoplasm of prostate) Father Abisai Recinos     Other (sepsis) Father Abisai Recinos     Other (stroke syndrome) Father Abisaikenia Recinos     Depression Father Abisai Recinos     Mental illness Father Abisai Recinos     Other (PTCA) Sister Suzan Wang     Depression Sister Suzan Wang     Heart disease Sister Suzan Felipe     Depression Brother Carlitos and Star     Hypertension Brother Carlitos and Star         Allergies:  Ketorolac, Ketorolac tromethamine, Pseudoephedrine, Wellbutrin [bupropion hcl], Sudafed [pseudoephedrine hcl], Cefdinir, Levofloxacin, Nsaids (non-steroidal anti-inflammatory drug), Oxycodone-acetaminophen, Propoxyphene n-acetaminophen, and Doxycycline    Outpatient Medications:  Current Outpatient Medications   Medication Instructions    amLODIPine (NORVASC) 10 mg, oral, Daily    blood-glucose meter (OneTouch Ultra2 Meter) misc USE TO CHECK BLOOD SUGAR    clonazePAM (KLONOPIN) 1 mg, oral, 2 times daily PRN    escitalopram (LEXAPRO) 10 mg, oral, Daily    fluticasone (Flonase) 50  mcg/actuation nasal spray USE 1 SPRAY EACH NOSTRIL TWICE DAILY - OR - USE 2 SPRAYS EACH NOSTRIL ONCE DAILY.    lancets (OneTouch UltraSoft Lancets) misc Check BG as needed for hypoglycemia    OneTouch Ultra Test strip Check BG as needed for symptoms of hypoglycemia    OneTouch Ultra2 Meter misc USE TO CHECK BLOOD SUGAR    propranolol (INDERAL) 20 mg, oral, 2 times daily    sertraline (ZOLOFT) 100 mg, oral, Daily    tadalafil (CIALIS) 20 mg, oral, As needed       Physical Exam:  Constitutional: Pleasant, Awake/Alert/Oriented to person place and time.   Head: Atraumatic, Normocephalic  Eyes: EOMI. SUKH  Neck:  No JVD  Cardiovascular: Regular rate and rhythm, S1, S2. No extra heart sounds or murmurs  Respiratory: Clear to auscultation bilaterally. No wheezing, rales or rhonchi. Good chest wall expansion  Abdomen: Soft, Nontender. Bowel sounds appreciated  Musculoskeletal: ROM intact. Muscle strength grossly intact upper and lower extremities 5/5.   Neurological: CNII-XII intact. Sensation grossly intact  Extremities: Warm and dry. No acute rashes and lesions  Psychiatric: Appropriate mood and affect       Last Labs:  CBC -  Lab Results   Component Value Date    WBC 6.2 09/25/2024    HGB 11.2 (L) 09/25/2024    HCT 32.7 (L) 09/25/2024    MCV 98 09/25/2024     09/25/2024       CMP -  Lab Results   Component Value Date    CALCIUM 9.6 09/25/2024    PHOS 3.9 04/29/2024    PROT 7.6 09/25/2024    ALBUMIN 4.8 09/25/2024    AST 27 09/25/2024    ALT 16 09/25/2024    ALKPHOS 47 09/25/2024    BILITOT 1.1 09/25/2024       LIPID PANEL -   Lab Results   Component Value Date    CHOL 128 04/28/2024    TRIG 71 04/28/2024    HDL 43.4 04/28/2024    CHHDL 2.9 04/28/2024    LDLF 100 (H) 04/21/2022    VLDL 14 04/28/2024    NHDL 85 04/28/2024       RENAL FUNCTION PANEL -   Lab Results   Component Value Date    GLUCOSE 129 (H) 09/25/2024     09/25/2024    K 4.8 09/25/2024     09/25/2024    CO2 25 09/25/2024    ANIONGAP 14  09/25/2024    BUN 11 09/25/2024    CREATININE 0.56 09/25/2024    GFRMALE >90 01/09/2023    CALCIUM 9.6 09/25/2024    PHOS 3.9 04/29/2024    ALBUMIN 4.8 09/25/2024        Lab Results   Component Value Date     (H) 04/28/2024    HGBA1C 4.8 04/28/2024       Last Cardiology Tests:  Echo:  Transthoracic Echo (TTE) Complete 04/29/2024  CONCLUSIONS:   1. Left ventricular systolic function is normal with a 60-65% estimated ejection fraction.   2. The left atrium is moderate to severely dilated.   3. There is mild mitral and tricuspid regurgitation.   4. There is no evidence of a patent foramen ovale.   5. The estimated pulmonary artery pressure is mildly elevated with the RVSP at 48.7 mmHg.     2/1/2024 echo:  CONCLUSIONS:   1. Left ventricular systolic function is normal with a 60% estimated ejection fraction.   2. There is mild mitral regurgitation.     Stress Test:  Stress Test 02/01/2024  Summary:   1. Frequent PVCs and [place] with stress.   2. Abnormal Stress Test.   3. Adequate level of stress achieved.     Cardiac Imaging:  CT HEART CALCIUM SCORING WO IV CONTRAST 06/09/2022  IMPRESSION:  1. Coronary artery calcium score of  0*.  2. Mild aneurysmal dilatation of the thoracic aorta (4.1cm). Recommend  follow-up with contrasted chest CT in 2 years for surveillance.     5/23/2024-6/5/2024 zio:  Min HR 36bpm, max HR 185bpm, Avg HR 63bpm  1 run non-sustained VT lasting 4 beats (may be SVT with aberrancy)   63 SVT events longest lasting 19 beats  Isolated PVCs 1.5% burden     Lab review: I have personally reviewed the laboratory result(s)   Diagnostic review: I have personally reviewed the result(s) of the Echocardiogram, stress, and CCS     Assessment/Plan   Pleasant 59 y.o. male  with h/o anxiety, depression, BPH, htn, and PVCs presenting today for follow up.  Continues  to have daily symptoms of heart racing palpitations with associated SOB and dull chest pain.  He is unsure if symptoms are related to  uncontrolled anxiety vs. PVCs.  Intolerant of diltiazem and Toprol XL in the past.     Plan:  -Start propranolol 20mg BID  -Continue amlodipine 10mg daily  -Follow up in 1 month for reassessment of symptoms-- consider EP referral if symptoms persist or if patient is unable to tolerate propranolol.       HUMBERTO Quesada-CNP

## 2024-10-15 ENCOUNTER — APPOINTMENT (OUTPATIENT)
Dept: PRIMARY CARE | Facility: CLINIC | Age: 59
End: 2024-10-15
Payer: COMMERCIAL

## 2024-10-15 VITALS
DIASTOLIC BLOOD PRESSURE: 66 MMHG | WEIGHT: 210.4 LBS | BODY MASS INDEX: 29.46 KG/M2 | HEIGHT: 71 IN | OXYGEN SATURATION: 97 % | SYSTOLIC BLOOD PRESSURE: 118 MMHG | HEART RATE: 61 BPM

## 2024-10-15 DIAGNOSIS — Z00.00 MEDICARE ANNUAL WELLNESS VISIT, SUBSEQUENT: Primary | ICD-10-CM

## 2024-10-15 DIAGNOSIS — Z12.5 SCREENING FOR PROSTATE CANCER: ICD-10-CM

## 2024-10-15 DIAGNOSIS — R63.4 WEIGHT LOSS: ICD-10-CM

## 2024-10-15 DIAGNOSIS — R97.20 ABNORMAL PSA: ICD-10-CM

## 2024-10-15 DIAGNOSIS — F41.9 ANXIETY AND DEPRESSION: ICD-10-CM

## 2024-10-15 DIAGNOSIS — R73.01 IFG (IMPAIRED FASTING GLUCOSE): ICD-10-CM

## 2024-10-15 DIAGNOSIS — J30.89 ENVIRONMENTAL AND SEASONAL ALLERGIES: ICD-10-CM

## 2024-10-15 DIAGNOSIS — F32.A ANXIETY AND DEPRESSION: ICD-10-CM

## 2024-10-15 PROCEDURE — 3074F SYST BP LT 130 MM HG: CPT | Performed by: FAMILY MEDICINE

## 2024-10-15 PROCEDURE — 3078F DIAST BP <80 MM HG: CPT | Performed by: FAMILY MEDICINE

## 2024-10-15 PROCEDURE — G0439 PPPS, SUBSEQ VISIT: HCPCS | Performed by: FAMILY MEDICINE

## 2024-10-15 PROCEDURE — 99214 OFFICE O/P EST MOD 30 MIN: CPT | Performed by: FAMILY MEDICINE

## 2024-10-15 PROCEDURE — 3008F BODY MASS INDEX DOCD: CPT | Performed by: FAMILY MEDICINE

## 2024-10-15 PROCEDURE — 1036F TOBACCO NON-USER: CPT | Performed by: FAMILY MEDICINE

## 2024-10-15 RX ORDER — TADALAFIL 5 MG/1
5 TABLET ORAL DAILY PRN
COMMUNITY
Start: 2024-09-11

## 2024-10-15 RX ORDER — FLUTICASONE PROPIONATE 50 MCG
2 SPRAY, SUSPENSION (ML) NASAL DAILY
Qty: 48 ML | Refills: 1 | Status: SHIPPED | OUTPATIENT
Start: 2024-10-15

## 2024-10-15 RX ORDER — ESCITALOPRAM OXALATE 10 MG/1
15 TABLET ORAL DAILY
Qty: 150 TABLET | Refills: 1 | Status: SHIPPED | OUTPATIENT
Start: 2024-10-15

## 2024-10-15 RX ORDER — HYDROXYZINE HYDROCHLORIDE 25 MG/1
12.5-25 TABLET, FILM COATED ORAL 3 TIMES DAILY PRN
Qty: 90 TABLET | Refills: 1 | Status: SHIPPED | OUTPATIENT
Start: 2024-10-15

## 2024-10-15 ASSESSMENT — PATIENT HEALTH QUESTIONNAIRE - PHQ9
1. LITTLE INTEREST OR PLEASURE IN DOING THINGS: MORE THAN HALF THE DAYS
8. MOVING OR SPEAKING SO SLOWLY THAT OTHER PEOPLE COULD HAVE NOTICED. OR THE OPPOSITE, BEING SO FIGETY OR RESTLESS THAT YOU HAVE BEEN MOVING AROUND A LOT MORE THAN USUAL: SEVERAL DAYS
10. IF YOU CHECKED OFF ANY PROBLEMS, HOW DIFFICULT HAVE THESE PROBLEMS MADE IT FOR YOU TO DO YOUR WORK, TAKE CARE OF THINGS AT HOME, OR GET ALONG WITH OTHER PEOPLE: SOMEWHAT DIFFICULT
2. FEELING DOWN, DEPRESSED OR HOPELESS: NEARLY EVERY DAY
4. FEELING TIRED OR HAVING LITTLE ENERGY: MORE THAN HALF THE DAYS
7. TROUBLE CONCENTRATING ON THINGS, SUCH AS READING THE NEWSPAPER OR WATCHING TELEVISION: MORE THAN HALF THE DAYS
3. TROUBLE FALLING OR STAYING ASLEEP OR SLEEPING TOO MUCH: NEARLY EVERY DAY
5. POOR APPETITE OR OVEREATING: NEARLY EVERY DAY
SUM OF ALL RESPONSES TO PHQ QUESTIONS 1-9: 17
6. FEELING BAD ABOUT YOURSELF - OR THAT YOU ARE A FAILURE OR HAVE LET YOURSELF OR YOUR FAMILY DOWN: SEVERAL DAYS
9. THOUGHTS THAT YOU WOULD BE BETTER OFF DEAD, OR OF HURTING YOURSELF: NOT AT ALL
SUM OF ALL RESPONSES TO PHQ9 QUESTIONS 1 AND 2: 5

## 2024-10-15 ASSESSMENT — ENCOUNTER SYMPTOMS
NERVOUS/ANXIOUS: 1
LOSS OF SENSATION IN FEET: 0
OCCASIONAL FEELINGS OF UNSTEADINESS: 0
DYSPHORIC MOOD: 1
DEPRESSION: 1

## 2024-10-15 ASSESSMENT — ANXIETY QUESTIONNAIRES
IF YOU CHECKED OFF ANY PROBLEMS ON THIS QUESTIONNAIRE, HOW DIFFICULT HAVE THESE PROBLEMS MADE IT FOR YOU TO DO YOUR WORK, TAKE CARE OF THINGS AT HOME, OR GET ALONG WITH OTHER PEOPLE: SOMEWHAT DIFFICULT
2. NOT BEING ABLE TO STOP OR CONTROL WORRYING: NEARLY EVERY DAY
5. BEING SO RESTLESS THAT IT IS HARD TO SIT STILL: MORE THAN HALF THE DAYS
3. WORRYING TOO MUCH ABOUT DIFFERENT THINGS: MORE THAN HALF THE DAYS
7. FEELING AFRAID AS IF SOMETHING AWFUL MIGHT HAPPEN: MORE THAN HALF THE DAYS
1. FEELING NERVOUS, ANXIOUS, OR ON EDGE: NEARLY EVERY DAY
4. TROUBLE RELAXING: NEARLY EVERY DAY
6. BECOMING EASILY ANNOYED OR IRRITABLE: NOT AT ALL
GAD7 TOTAL SCORE: 15

## 2024-10-15 ASSESSMENT — ACTIVITIES OF DAILY LIVING (ADL)
MANAGING_FINANCES: INDEPENDENT
TAKING_MEDICATION: INDEPENDENT
DRESSING: INDEPENDENT
GROCERY_SHOPPING: INDEPENDENT
BATHING: INDEPENDENT
DOING_HOUSEWORK: INDEPENDENT

## 2024-10-15 NOTE — PROGRESS NOTES
"Subjective   Reason for Visit: Clifford Recinos is an 59 y.o. male here for Anxiety, Depression, and Medicare Annual Wellness Visit Subsequent     Past Medical, Surgical, and Family History reviewed and updated in chart.    Reviewed all medications by prescribing practitioner or clinical pharmacist (such as prescriptions, OTCs, herbal therapies and supplements) and documented in the medical record.    HPI  Historian(s): Self    Lexapro helping. Would like to increase the dose, but not to 20 mg.     Reports fasting glucose up to 150.    Never tried Propranolol.    Started exercise 2-3 weeks ago.    Patient Care Team:  Lacho Hrat DO as PCP - General (Family Medicine)  HUMBERTO Quesada-CNP as Nurse Practitioner (Cardiology)  Ada Langley MD as Surgeon (Urology)     Review of Systems   Psychiatric/Behavioral:  Positive for dysphoric mood. The patient is nervous/anxious.    All other systems reviewed and are negative.      Current Outpatient Medications   Medication Instructions    amLODIPine (NORVASC) 10 mg, oral, Daily    blood-glucose meter (OneTouch Ultra2 Meter) misc USE TO CHECK BLOOD SUGAR    clonazePAM (KLONOPIN) 1 mg, oral, 2 times daily PRN    escitalopram (LEXAPRO) 15 mg, oral, Daily    fluticasone (Flonase) 50 mcg/actuation nasal spray 2 sprays, Each Nostril, Daily, USE 1 SPRAY EACH NOSTRIL TWICE DAILY - OR - USE 2 SPRAYS EACH NOSTRIL ONCE DAILY.    hydrOXYzine HCL (ATARAX) 12.5-25 mg, oral, 3 times daily PRN    lancets (OneTouch UltraSoft Lancets) misc Check BG as needed for hypoglycemia    OneTouch Ultra Test strip Check BG as needed for symptoms of hypoglycemia    OneTouch Ultra2 Meter misc USE TO CHECK BLOOD SUGAR    propranolol (INDERAL) 20 mg, oral, 2 times daily    sertraline (ZOLOFT) 100 mg, oral, Daily    tadalafil (CIALIS) 20 mg, oral, As needed    tadalafil (CIALIS) 5 mg, oral, Daily PRN       Objective   Vitals:  /66   Pulse 61   Ht 1.803 m (5' 11\")   Wt 95.4 kg (210 lb 6.4 oz)   " SpO2 97%   BMI 29.34 kg/m²       Lab Results   Component Value Date    PSASCREEN <0.10 12/28/2023    PSASCREEN <0.10 04/21/2022     Lab Results   Component Value Date    HGBA1C 4.8 04/28/2024    HGBA1C 4.8 04/12/2024    HGBA1C 5.4 12/28/2023    HGBA1C 5.3 04/21/2022    HGBA1C 5.6 05/11/2021     Lab Results   Component Value Date    LDLCALC 70 04/28/2024    LDLCALC 58 12/28/2023    LDLF 100 (H) 04/21/2022     Lab Results   Component Value Date    TRIG 71 04/28/2024    TRIG 180 (H) 12/28/2023    TRIG 92 04/21/2022      Lab Results   Component Value Date    CREATININE 0.56 09/25/2024    CREATININE 0.67 06/24/2024    CREATININE 0.60 04/29/2024    CREATININE 0.74 04/28/2024    CREATININE 0.58 12/28/2023    CREATININE 0.66 01/09/2023    CREATININE 0.63 04/21/2022     Lab Results   Component Value Date    EGFR >90 09/25/2024    EGFR >90 06/24/2024    EGFR >90 04/29/2024    EGFR >90 04/28/2024    EGFR >90 12/28/2023    GFRMALE >90 01/09/2023    GFRMALE >90 04/21/2022      Lab Results   Component Value Date    TSH 1.72 04/12/2024    TSH 1.10 12/28/2023    TSH 1.18 04/21/2022        Physical Exam  Vitals and nursing note reviewed.   Constitutional:       General: He is not in acute distress.     Appearance: Normal appearance.      Comments: No assistive device presently being used.   HENT:      Head: Normocephalic and atraumatic.   Eyes:      General: No scleral icterus.     Extraocular Movements: Extraocular movements intact.      Conjunctiva/sclera: Conjunctivae normal.   Pulmonary:      Effort: Pulmonary effort is normal. No respiratory distress.   Skin:     General: Skin is warm and dry.      Coloration: Skin is not jaundiced.   Neurological:      Mental Status: He is alert and oriented to person, place, and time. Mental status is at baseline.      Motor: Tremor present.   Psychiatric:         Behavior: Behavior normal.         Assessment & Plan  Medicare annual wellness visit, subsequent  59yM with significant anxiety,  otherwise doing fairly well. Declines all vaccines.       Anxiety and depression    Orders:    Follow Up In Primary Care - Established    escitalopram (Lexapro) 10 mg tablet; Take 1.5 tablets (15 mg) by mouth once daily.    hydrOXYzine HCL (Atarax) 25 mg tablet; Take 0.5-1 tablets (12.5-25 mg) by mouth 3 times a day as needed for anxiety.    Follow Up In Primary Care - Established; Future    Follow Up In Advanced Primary Care - Behavioral Health [facility]ative Care [facility]; Future    IFG (impaired fasting glucose)    Orders:    Hemoglobin A1C; Future    Environmental and seasonal allergies    Orders:    fluticasone (Flonase) 50 mcg/actuation nasal spray; Administer 2 sprays into each nostril once daily. USE 1 SPRAY EACH NOSTRIL TWICE DAILY - OR - USE 2 SPRAYS EACH NOSTRIL ONCE DAILY.    Screening for prostate cancer    Orders:    Prostate Specific Antigen, Screen; Future    Abnormal PSA  Undetectable.       Weight loss  Likely secondary to significant anxiety. Increase Lexapro. Return 3 months, earlier if weight loss continues.

## 2024-10-15 NOTE — ASSESSMENT & PLAN NOTE
Orders:    Follow Up In Primary Care - Established    escitalopram (Lexapro) 10 mg tablet; Take 1.5 tablets (15 mg) by mouth once daily.    hydrOXYzine HCL (Atarax) 25 mg tablet; Take 0.5-1 tablets (12.5-25 mg) by mouth 3 times a day as needed for anxiety.    Follow Up In Primary Care - Established; Future    Follow Up In Advanced Primary Care - Behavioral Health [facility]; Future

## 2024-10-15 NOTE — ASSESSMENT & PLAN NOTE
Likely secondary to significant anxiety. Increase Lexapro. Return 3 months, earlier if weight loss continues.

## 2024-10-15 NOTE — ASSESSMENT & PLAN NOTE
Orders:    fluticasone (Flonase) 50 mcg/actuation nasal spray; Administer 2 sprays into each nostril once daily. USE 1 SPRAY EACH NOSTRIL TWICE DAILY - OR - USE 2 SPRAYS EACH NOSTRIL ONCE DAILY.

## 2024-10-15 NOTE — PATIENT INSTRUCTIONS
"Please follow-up with your sleep specialist as soon as possible regarding your sleep apnea.    Sleep Medicine  Dr. Wanda Manriquez, [place] PA-C, Damaris Larsen CNP ([place]) [phone]    Please return for a(n) glucose/sugar, anxiety, weight, and medication follow-up appointment in 3 months, earlier if any question or concern. Please schedule additional problem-focused appointment(s) to address additional problem(s).    Recommended vaccines:  Influenza, annual  Prevnar-20 \"pneumonia\" vaccine  Shingrix (shingles) vaccine series  TDaP (tetanus-diphtheria-pertussis) vaccine  Avoid taking Biotin (a vitamin, shows up particularly in hair/nail supplements) for a week prior to any blood tests, as it can interfere with certain results. Fasting for labs means 12 hours, nothing to eat or drink, except water and medications, unless directed otherwise.    For assistance with scheduling referrals or consultations, please call [phone]. For laboratory, radiology, and other tests, please call [phone] ([phone] for pediatrics). Please review prescription inserts and published information for possible adverse effects of all medications. Return after testing or consultation to review results and recommendations, if symptoms persist, change, worsen, or return, or if you have any question or concern. If you do not get results within 7-10 days, or you have any question or concern, please send a message, call the office ([phone]), or return to the office for a follow-up appointment. For non-emergencies, you may call the office. For emergencies, call 9-1-1 or go to the nearest Emergency Department. Please schedule additional appointment(s) to address concern(s) not addressed today. An annual Wellness visit is strongly recommended. A Wellness visit should be dedicated to addressing routine health maintenance matters (e.g., cancer screenings, cardiovascular screening, etc.). Problem-focused visits, typically prompted " by symptoms or specific concerns, are usually conducted separately, particularly if multiple or complex problems need to be addressed.    In general, results are not released or discussed over the telephone, but at an appointment or via  [employer]. Results of tests done through [facility] are released via  [employer] (see below).  [email or web address]   [employer] support line: [phone]

## 2024-11-05 ENCOUNTER — APPOINTMENT (OUTPATIENT)
Dept: UROLOGY | Facility: CLINIC | Age: 59
End: 2024-11-05
Payer: COMMERCIAL

## 2024-11-07 ENCOUNTER — DOCUMENTATION (OUTPATIENT)
Dept: BEHAVIORAL HEALTH | Facility: CLINIC | Age: 59
End: 2024-11-07

## 2024-11-07 ENCOUNTER — APPOINTMENT (OUTPATIENT)
Dept: PRIMARY CARE | Facility: CLINIC | Age: 59
End: 2024-11-07
Payer: COMMERCIAL
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
## 2024-11-19 ENCOUNTER — APPOINTMENT (OUTPATIENT)
Dept: CARDIOLOGY | Facility: HOSPITAL | Age: 59
End: 2024-11-19
Payer: COMMERCIAL
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
## 2024-11-20 ENCOUNTER — DOCUMENTATION (OUTPATIENT)
Dept: PRIMARY CARE | Facility: CLINIC | Age: 59
End: 2024-11-20

## 2024-11-20 ENCOUNTER — APPOINTMENT (OUTPATIENT)
Dept: PRIMARY CARE | Facility: CLINIC | Age: 59
End: 2024-11-20
Payer: COMMERCIAL

## 2024-11-20 DIAGNOSIS — F32.A ANXIETY AND DEPRESSION: ICD-10-CM

## 2024-11-20 DIAGNOSIS — G47.00 INSOMNIA, UNSPECIFIED TYPE: Primary | ICD-10-CM

## 2024-11-20 DIAGNOSIS — F41.9 ANXIETY AND DEPRESSION: ICD-10-CM

## 2024-11-20 DIAGNOSIS — F44.9 CONVERSION DISORDER: Primary | ICD-10-CM

## 2024-11-20 RX ORDER — TRAZODONE HYDROCHLORIDE 50 MG/1
50 TABLET ORAL NIGHTLY PRN
Qty: 30 TABLET | Refills: 2 | Status: SHIPPED | OUTPATIENT
Start: 2024-11-20

## 2024-11-21 ENCOUNTER — DOCUMENTATION (OUTPATIENT)
Dept: PRIMARY CARE | Facility: CLINIC | Age: 59
End: 2024-11-21
Payer: COMMERCIAL

## 2024-11-26 ENCOUNTER — APPOINTMENT (OUTPATIENT)
Dept: PRIMARY CARE | Facility: CLINIC | Age: 59
End: 2024-11-26
Payer: COMMERCIAL

## 2024-12-01 NOTE — PROGRESS NOTES
Collaborative Care ([facility])  Progress Note    Type of Interaction: In Office    Start Time: 1100    End Time: 200        Appointment: Scheduled    Reason for Visit:   Chief Complaint   Patient presents with    Depression     Depression, anxiety, panic d/o    Trazodone was called in by PCP today for sleep, PT states he will pick it up. Today, he is focused on all the symptoms he is having due to low testosterone, including night sweats. States his endocrinologist will not prescribe supplemental Testosterone until he has completed a sleep study and followed the recommendations. He states that he did the sleep study, and they told him he needs to use a CPAP but he never followed though and got one. He is unsure if he needs one currently as he has lost so much weight. Determined that he needed a referral to the collaborative care nurse  due to so many doctors, appointments, medications and struggling with how to manage all of this. Encouraged square breathing, daily relaxation sessions.       Interval History / Patient Symptoms:     No data recorded      Interventions Provided: Psychoeducation, Problem Solving Treatment, Behavioral Activation, Interpersonal Therapy, and Develop Coping Strategies      Progress Made: Minimum    Response to Intervention: Remains very tremulous, at times stutters, overwhelmed with physical symptoms including weakness, weight loss, mood issues. If nurse  is unable to assist in coordinating MD's and appointments, a call to endocrinologist may be warranted.         Plan:     Care Plan    There is no care plan documentation to display.         There are no Patient Instructions on file for this visit.      Follow Up / Next Appointment:  1 week

## 2024-12-03 ENCOUNTER — APPOINTMENT (OUTPATIENT)
Dept: PRIMARY CARE | Facility: CLINIC | Age: 59
End: 2024-12-03
Payer: COMMERCIAL

## 2024-12-06 ENCOUNTER — DOCUMENTATION WITH CHARGES (OUTPATIENT)
Dept: PRIMARY CARE | Facility: CLINIC | Age: 59
End: 2024-12-06
Payer: COMMERCIAL

## 2024-12-06 DIAGNOSIS — F41.8 ANXIETY WITH DEPRESSION: Primary | ICD-10-CM

## 2024-12-10 NOTE — PROGRESS NOTES
"Collaborative Care ([facility])  Progress Note    Type of Interaction: In Office    Start Time: 1:00    End Time: 2:00        Appointment: Scheduled    Reason for Visit:   Chief Complaint   Patient presents with    Anxiety     Anxiety with depression    Clifford reports very slight improvement in mood/anxiety levels, Trazodone is helping him sleep, which has been a relief. Discussed plan for getting back to his endocrinologist (will likely have to do another sleep study) in order to get on supplemental testosterone. During session, called  psychiatry together, found out that he can go in person to [place] office, that appointment was made. Clifford discussed recent stressors including trying to help his nephew, who is schizophrenic. Also discussed mother, who was a severe hoarder. He reports that the shakiness he feels inside is \"down by half\".       Interval History / Patient Symptoms:     No data recorded      Interventions Provided: Psychoeducation, Problem Solving Treatment, Interpersonal Therapy, Solution Focused Therapy, and Develop Coping Strategies      Progress Made: Minimum    Response to Intervention: Slightly more relaxed, open in session, less hopeless. More expressive facially.        Plan: 2 weeks    Care Plan    There is no care plan documentation to display.         There are no Patient Instructions on file for this visit.      Follow Up / Next Appointment:        "

## 2024-12-14 DIAGNOSIS — E16.2 HYPOGLYCEMIA: ICD-10-CM

## 2024-12-16 ENCOUNTER — OFFICE VISIT (OUTPATIENT)
Dept: HEMATOLOGY/ONCOLOGY | Facility: CLINIC | Age: 59
End: 2024-12-16
Payer: COMMERCIAL

## 2024-12-16 VITALS
BODY MASS INDEX: 27.81 KG/M2 | OXYGEN SATURATION: 97 % | DIASTOLIC BLOOD PRESSURE: 72 MMHG | HEART RATE: 54 BPM | RESPIRATION RATE: 17 BRPM | SYSTOLIC BLOOD PRESSURE: 151 MMHG | TEMPERATURE: 98.2 F | WEIGHT: 199.41 LBS

## 2024-12-16 DIAGNOSIS — D50.9 IRON DEFICIENCY ANEMIA, UNSPECIFIED IRON DEFICIENCY ANEMIA TYPE: Primary | ICD-10-CM

## 2024-12-16 DIAGNOSIS — Z12.5 SCREENING FOR PROSTATE CANCER: ICD-10-CM

## 2024-12-16 DIAGNOSIS — D64.9 ANEMIA, UNSPECIFIED TYPE: ICD-10-CM

## 2024-12-16 DIAGNOSIS — I10 ESSENTIAL HYPERTENSION: ICD-10-CM

## 2024-12-16 DIAGNOSIS — R73.9 HYPERGLYCEMIA: ICD-10-CM

## 2024-12-16 LAB
BASOPHILS # BLD AUTO: 0.06 X10*3/UL (ref 0–0.1)
BASOPHILS NFR BLD AUTO: 1.3 %
EOSINOPHIL # BLD AUTO: 0.04 X10*3/UL (ref 0–0.7)
EOSINOPHIL NFR BLD AUTO: 0.9 %
ERYTHROCYTE [DISTWIDTH] IN BLOOD BY AUTOMATED COUNT: 16.7 % (ref 11.5–14.5)
FERRITIN SERPL-MCNC: 116 NG/ML (ref 20–300)
HCT VFR BLD AUTO: 32.7 % (ref 41–52)
HGB BLD-MCNC: 10.7 G/DL (ref 13.5–17.5)
IMM GRANULOCYTES # BLD AUTO: 0 X10*3/UL (ref 0–0.7)
IMM GRANULOCYTES NFR BLD AUTO: 0 % (ref 0–0.9)
IRON SATN MFR SERPL: 37 % (ref 25–45)
IRON SERPL-MCNC: 129 UG/DL (ref 35–150)
LDH SERPL L TO P-CCNC: 140 U/L (ref 84–246)
LYMPHOCYTES # BLD AUTO: 0.52 X10*3/UL (ref 1.2–4.8)
LYMPHOCYTES NFR BLD AUTO: 11.5 %
MCH RBC QN AUTO: 33.5 PG (ref 26–34)
MCHC RBC AUTO-ENTMCNC: 32.7 G/DL (ref 32–36)
MCV RBC AUTO: 103 FL (ref 80–100)
MONOCYTES # BLD AUTO: 0.27 X10*3/UL (ref 0.1–1)
MONOCYTES NFR BLD AUTO: 6 %
NEUTROPHILS # BLD AUTO: 3.62 X10*3/UL (ref 1.2–7.7)
NEUTROPHILS NFR BLD AUTO: 80.3 %
PLATELET # BLD AUTO: 383 X10*3/UL (ref 150–450)
RBC # BLD AUTO: 3.19 X10*6/UL (ref 4.5–5.9)
TIBC SERPL-MCNC: 346 UG/DL (ref 240–445)
UIBC SERPL-MCNC: 217 UG/DL (ref 110–370)
WBC # BLD AUTO: 4.5 X10*3/UL (ref 4.4–11.3)

## 2024-12-16 RX ORDER — BLOOD SUGAR DIAGNOSTIC
STRIP MISCELLANEOUS
Qty: 100 STRIP | Refills: 1 | Status: SHIPPED | OUTPATIENT
Start: 2024-12-16

## 2024-12-16 SDOH — ECONOMIC STABILITY: FOOD INSECURITY: WITHIN THE PAST 12 MONTHS, THE FOOD YOU BOUGHT JUST DIDN'T LAST AND YOU DIDN'T HAVE MONEY TO GET MORE.: NEVER TRUE

## 2024-12-16 SDOH — ECONOMIC STABILITY: FOOD INSECURITY: WITHIN THE PAST 12 MONTHS, YOU WORRIED THAT YOUR FOOD WOULD RUN OUT BEFORE YOU GOT THE MONEY TO BUY MORE.: NEVER TRUE

## 2024-12-16 ASSESSMENT — COLUMBIA-SUICIDE SEVERITY RATING SCALE - C-SSRS
6. HAVE YOU EVER DONE ANYTHING, STARTED TO DO ANYTHING, OR PREPARED TO DO ANYTHING TO END YOUR LIFE?: NO
2. HAVE YOU ACTUALLY HAD ANY THOUGHTS OF KILLING YOURSELF?: NO
1. IN THE PAST MONTH, HAVE YOU WISHED YOU WERE DEAD OR WISHED YOU COULD GO TO SLEEP AND NOT WAKE UP?: NO

## 2024-12-16 ASSESSMENT — PAIN SCALES - GENERAL: PAINLEVEL_OUTOF10: 3

## 2024-12-16 ASSESSMENT — PATIENT HEALTH QUESTIONNAIRE - PHQ9
10. IF YOU CHECKED OFF ANY PROBLEMS, HOW DIFFICULT HAVE THESE PROBLEMS MADE IT FOR YOU TO DO YOUR WORK, TAKE CARE OF THINGS AT HOME, OR GET ALONG WITH OTHER PEOPLE: SOMEWHAT DIFFICULT
1. LITTLE INTEREST OR PLEASURE IN DOING THINGS: NOT AT ALL
SUM OF ALL RESPONSES TO PHQ9 QUESTIONS 1 AND 2: 1
2. FEELING DOWN, DEPRESSED OR HOPELESS: SEVERAL DAYS

## 2024-12-16 NOTE — PROGRESS NOTES
Patient Visit Information:   Visit Type: Benign Heme Follow-up     Cancer History:   Treatment Synopsis:    pt is a pleasant 57-year-old  male was referred by Dr. De for evaluation of leukopenia and normocytic anemia     pt has been feeling tired for many years and has been progressively worsened. c/o leg and join and whole body muscle achiness. had lots of fracture in the past.     cbc in 6/2022 showed hgb 11.3, down from 12.5 in 4/2022. stated was also found to have anemic 2 years ago at [facility].    denies bleeding. last colonoscopy was maybe 6 yrs ago per pt. otherwise doing fine denies fever chills night sweats decreased appetite weight loss palpitation chest pain nausea vomiting diarrhea constipation abdominal pain or urinary symptoms    Past medical history anxiety depression, hypertension, back pain, irritable bowel syndrome, compression fracture of T9 vertebra,     Admitted after syncopal episode. Noted to have worsening anemia. Reports having intermittent black stools and BRBPR. Outpatient C-scope and EGD with polyps removed in transverse and descending colon path c/w TA, hemorrhoids, Hiatal hernia without Israel lesions     Past surgical history appendectomy cholecystectomy    Family history prostate cancer    History of Present Illness:   Patient presents for follow up accompanied by his wife. Continues to have chronic fatigue has been progressively worsening.  Continues to have medication changes for his anxiety and currently on Trazodone and Lexapro. Notes good appetite but losing weight unclear if related to medication changes. Recent CT scan showing splenomegaly potentially related to cirrhosis. Otherwise doing well.     Review of Systems:    All of the systems have been reviewed and are negative for complaints except what is stated in the HPI and/or past medical history    Allergies and Intolerances:  Allergies   Allergen Reactions    Ketorolac Anaphylaxis    Ketorolac Tromethamine  Anaphylaxis    Pseudoephedrine Other    Wellbutrin [Bupropion Hcl] Anxiety, Other and Headache     urinary incontinence    Sudafed [Pseudoephedrine Hcl] Other     HEART POUNDING    Cefdinir Diarrhea    Levofloxacin Other     Insomnia, heart pounding, gen. shaking, frequency    Nsaids (Non-Steroidal Anti-Inflammatory Drug) GI bleeding    Oxycodone-Acetaminophen Other     HA, angry    Propoxyphene N-Acetaminophen Other     HA, vision change    Doxycycline Rash     Outpatient Medication Profile:   Current Outpatient Medications on File Prior to Visit   Medication Sig Dispense Refill    amLODIPine (Norvasc) 10 mg tablet Take 1 tablet (10 mg) by mouth once daily. (Patient taking differently: Take 0.5 tablets (5 mg) by mouth once daily.) 90 tablet 3    blood-glucose meter (OneTouch Ultra2 Meter) misc USE TO CHECK BLOOD SUGAR 1 each 0    clonazePAM (KlonoPIN) 1 mg tablet Take 1 tablet (1 mg) by mouth 2 times a day as needed for anxiety.      escitalopram (Lexapro) 10 mg tablet Take 1.5 tablets (15 mg) by mouth once daily. (Patient taking differently: Take 2 tablets (20 mg) by mouth once daily.) 150 tablet 1    fluticasone (Flonase) 50 mcg/actuation nasal spray Administer 2 sprays into each nostril once daily. USE 1 SPRAY EACH NOSTRIL TWICE DAILY - OR - USE 2 SPRAYS EACH NOSTRIL ONCE DAILY. 48 mL 1    lancets (OneTouch UltraSoft Lancets) misc Check BG as needed for hypoglycemia 100 each 2    OneTouch Ultra2 Meter misc USE TO CHECK BLOOD SUGAR      [DISCONTINUED] OneTouch Ultra Test strip Check BG as needed for symptoms of hypoglycemia 100 strip 2    hydrOXYzine HCL (Atarax) 25 mg tablet Take 0.5-1 tablets (12.5-25 mg) by mouth 3 times a day as needed for anxiety. (Patient not taking: Reported on 12/16/2024) 90 tablet 1    OneTouch Ultra Test strip CHECK BLOOD GLUCOSE AS NEEDED FOR SYMPTOMS OF LOW BLOOD SUGAR 100 strip 1    propranolol (Inderal) 20 mg tablet Take 1 tablet (20 mg) by mouth 2 times a day. (Patient not taking:  "Reported on 10/15/2024) 60 tablet 3    sertraline (Zoloft) 100 mg tablet TAKE 1 TABLET BY MOUTH EVERY DAY (Patient not taking: Reported on 10/15/2024) 100 tablet 1    tadalafil (Cialis) 20 mg tablet Take 1 tablet (20 mg) by mouth if needed for erectile dysfunction (Start with half tab. Take 2 hours prior to wanting erection.If you get an erection over 4 hours, go to the emergency room.). (Patient not taking: Reported on 12/16/2024) 30 tablet 6    tadalafil (Cialis) 5 mg tablet Take 1 tablet (5 mg) by mouth once daily as needed for erectile dysfunction. (Patient not taking: Reported on 12/16/2024)      traZODone (Desyrel) 50 mg tablet Take 1 tablet (50 mg) by mouth as needed at bedtime for sleep. 30 tablet 2     No current facility-administered medications on file prior to visit.     Vitals:      9/25/2024     5:04 PM 9/25/2024     5:25 PM 9/25/2024     7:28 PM 9/25/2024     7:54 PM 10/10/2024     2:21 PM 10/15/2024     4:22 PM 12/16/2024     1:23 PM   Vitals   Systolic  161 140 152 128 118 151   Diastolic  64 71 75 66 66 72   BP Location       Left arm   Heart Rate 40 71 52 52 73 61 54   Temp  36.6 °C (97.9 °F)     36.8 °C (98.2 °F)   Resp  18 11    17   Height  1.803 m (5' 11\")    1.803 m (5' 11\")    Weight (lb)  217   210.54 210.4 199.41   BMI  30.27 kg/m2   29.36 kg/m2 29.34 kg/m2 27.81 kg/m2   BSA (m2)  2.22 m2   2.19 m2 2.19 m2 2.13 m2   Visit Report Report Report Report Report Report Report Report      PHYSICAL EXAM:  Constitutional: alert, awake and oriented, not in acute distress   HEENT: moist mucous membranes, normal nose   Neck: supple, no lymphadenopathy   EYES: PERRL, EOM intact, conjunctiva normal  Skin: no jaundice, rash or erythema  Neurological: AAOx3, no gross focal deficit   Psychiatric: normal mood and behavior     Labs:  Lab Results   Component Value Date    WBC 4.5 12/16/2024    NEUTROABS 3.62 12/16/2024    IGABSOL 0.00 12/16/2024    LYMPHSABS 0.52 (L) 12/16/2024    MONOSABS 0.27 12/16/2024    " EOSABS 0.04 12/16/2024    BASOSABS 0.06 12/16/2024    RBC 3.19 (L) 12/16/2024     (H) 12/16/2024    MCHC 32.7 12/16/2024    HGB 10.7 (L) 12/16/2024    HCT 32.7 (L) 12/16/2024     12/16/2024     Lab Results   Component Value Date    IRON 129 12/16/2024    TIBC 346 12/16/2024    FERRITIN 116 12/16/2024      Lab Results   Component Value Date     12/16/2024     Assessment:    57-year-old  male presented with long standing h/o normocytic anemia and leukopenia mainly lymphocytopenia    Compression fracture of thoracic spine    Fatigue disproportional to the degree of the anemia, weakness and body achiness, +KENIA    Myloid panel with DISEASE ASSOCIATED GENOMIC FINDINGS: DNMT3A p.P849S ([id number] c.2545C>T)  SF3B1 p.K700E ([id number] c.2098A>G)    Plan:    Discussed with the patient and wife in detail regarding diagnosis etiologies of anemia    will consider BM bx if hgb lower than 10 or if no other etiology has been found for his fatigue. Currently having no B symptoms.     Recommend oral iron as tolerated.    Referral to hepatology     Will repeat CBC in 6 m    f/u w/PCP    Patient verbalized understanding, and all his questions were answered to his satisfaction    RTC in 6 months    30 min spent with patient greater than 50 % of which was spent in consultation and coordination of care.

## 2024-12-17 DIAGNOSIS — G47.00 INSOMNIA, UNSPECIFIED TYPE: ICD-10-CM

## 2024-12-17 LAB — VIT B12 SERPL-MCNC: 1191 PG/ML (ref 211–911)

## 2024-12-17 RX ORDER — TRAZODONE HYDROCHLORIDE 50 MG/1
50 TABLET ORAL NIGHTLY PRN
Qty: 90 TABLET | Refills: 0 | Status: SHIPPED | OUTPATIENT
Start: 2024-12-17

## 2024-12-18 ENCOUNTER — SOCIAL WORK (OUTPATIENT)
Dept: PRIMARY CARE | Facility: CLINIC | Age: 59
End: 2024-12-18
Payer: COMMERCIAL

## 2024-12-18 ENCOUNTER — APPOINTMENT (OUTPATIENT)
Dept: UROLOGY | Facility: CLINIC | Age: 59
End: 2024-12-18
Payer: COMMERCIAL

## 2024-12-19 ENCOUNTER — APPOINTMENT (OUTPATIENT)
Dept: ENDOCRINOLOGY | Facility: CLINIC | Age: 59
End: 2024-12-19
Payer: COMMERCIAL

## 2024-12-27 ENCOUNTER — LAB (OUTPATIENT)
Dept: LAB | Facility: LAB | Age: 59
End: 2024-12-27
Payer: COMMERCIAL

## 2024-12-27 DIAGNOSIS — Z12.5 SCREENING FOR PROSTATE CANCER: ICD-10-CM

## 2024-12-27 DIAGNOSIS — R73.01 IFG (IMPAIRED FASTING GLUCOSE): ICD-10-CM

## 2024-12-28 LAB
EST. AVERAGE GLUCOSE BLD GHB EST-MCNC: 94 MG/DL
HBA1C MFR BLD: 4.9 %
PSA SERPL-MCNC: <0.1 NG/ML

## 2025-01-03 ENCOUNTER — HOSPITAL ENCOUNTER (OUTPATIENT)
Dept: RADIOLOGY | Facility: HOSPITAL | Age: 60
Discharge: HOME | End: 2025-01-03
Payer: MEDICARE

## 2025-01-03 ENCOUNTER — LAB (OUTPATIENT)
Dept: LAB | Facility: LAB | Age: 60
End: 2025-01-03
Payer: MEDICARE

## 2025-01-03 ENCOUNTER — APPOINTMENT (OUTPATIENT)
Dept: PRIMARY CARE | Facility: CLINIC | Age: 60
End: 2025-01-03
Payer: COMMERCIAL

## 2025-01-03 VITALS
DIASTOLIC BLOOD PRESSURE: 65 MMHG | HEART RATE: 65 BPM | OXYGEN SATURATION: 95 % | SYSTOLIC BLOOD PRESSURE: 124 MMHG | BODY MASS INDEX: 27.21 KG/M2 | WEIGHT: 195.13 LBS

## 2025-01-03 DIAGNOSIS — R10.13 EPIGASTRIC PAIN: Primary | ICD-10-CM

## 2025-01-03 DIAGNOSIS — R10.13 EPIGASTRIC PAIN: ICD-10-CM

## 2025-01-03 DIAGNOSIS — K74.60 CIRRHOSIS OF LIVER WITHOUT ASCITES, UNSPECIFIED HEPATIC CIRRHOSIS TYPE (MULTI): ICD-10-CM

## 2025-01-03 DIAGNOSIS — E29.1 HYPOGONADISM IN MALE: ICD-10-CM

## 2025-01-03 LAB
ALBUMIN SERPL BCP-MCNC: 4.7 G/DL (ref 3.4–5)
ALP SERPL-CCNC: 47 U/L (ref 33–120)
ALT SERPL W P-5'-P-CCNC: 11 U/L (ref 10–52)
ANION GAP SERPL CALC-SCNC: 13 MMOL/L (ref 10–20)
AST SERPL W P-5'-P-CCNC: 12 U/L (ref 9–39)
BILIRUB SERPL-MCNC: 1.1 MG/DL (ref 0–1.2)
BUN SERPL-MCNC: 11 MG/DL (ref 6–23)
CALCIUM SERPL-MCNC: 9.3 MG/DL (ref 8.6–10.3)
CHLORIDE SERPL-SCNC: 106 MMOL/L (ref 98–107)
CO2 SERPL-SCNC: 25 MMOL/L (ref 21–32)
CREAT SERPL-MCNC: 0.62 MG/DL (ref 0.5–1.3)
CREAT SERPL-MCNC: 0.73 MG/DL (ref 0.6–1.3)
EGFRCR SERPLBLD CKD-EPI 2021: >90 ML/MIN/1.73M*2
ERYTHROCYTE [DISTWIDTH] IN BLOOD BY AUTOMATED COUNT: 16.6 % (ref 11.5–14.5)
GFR SERPL CREATININE-BSD FRML MDRD: >90 ML/MIN/1.73M*2
GLUCOSE SERPL-MCNC: 124 MG/DL (ref 74–99)
HCT VFR BLD AUTO: 32.7 % (ref 41–52)
HGB BLD-MCNC: 10.8 G/DL (ref 13.5–17.5)
LACTATE SERPL-SCNC: 1.4 MMOL/L (ref 0.4–2)
LIPASE SERPL-CCNC: 23 U/L (ref 9–82)
MCH RBC QN AUTO: 32.9 PG (ref 26–34)
MCHC RBC AUTO-ENTMCNC: 33 G/DL (ref 32–36)
MCV RBC AUTO: 100 FL (ref 80–100)
NRBC BLD-RTO: 0 /100 WBCS (ref 0–0)
PLATELET # BLD AUTO: 258 X10*3/UL (ref 150–450)
POTASSIUM SERPL-SCNC: 3.8 MMOL/L (ref 3.5–5.3)
PROT SERPL-MCNC: 7.1 G/DL (ref 6.4–8.2)
RBC # BLD AUTO: 3.28 X10*6/UL (ref 4.5–5.9)
SODIUM SERPL-SCNC: 140 MMOL/L (ref 136–145)
WBC # BLD AUTO: 5.2 X10*3/UL (ref 4.4–11.3)

## 2025-01-03 PROCEDURE — 83690 ASSAY OF LIPASE: CPT

## 2025-01-03 PROCEDURE — 82565 ASSAY OF CREATININE: CPT

## 2025-01-03 PROCEDURE — 83605 ASSAY OF LACTIC ACID: CPT

## 2025-01-03 PROCEDURE — 85027 COMPLETE CBC AUTOMATED: CPT

## 2025-01-03 ASSESSMENT — ENCOUNTER SYMPTOMS
FEVER: 0
DIAPHORESIS: 0
NAUSEA: 1
DIARRHEA: 1
VOMITING: 0
BLOOD IN STOOL: 0
ABDOMINAL PAIN: 1
CHILLS: 0

## 2025-01-03 NOTE — PROGRESS NOTES
"Subjective   Patient ID: Clifford Recinos is a 59 y.o. male who presents for Weight Loss (Pt presents with spouse c/o weight loss, muscle loss, abdominal pain, diarrhea.).  HPI Historian(s): Self and Wife    c/o epigastric and RUQ abdominal discomfort, worse after eating, accompanied by back pain. h/o pancreatitis about 30y ago about age 27, unknown etiology. Intermittently pale stool, no blood or black/tarry. Some greasy/oily/green diarrhea. Family members have had upper respiratory symptoms.     Reports sleeping well with Trazodone, and nocturnal SPO2 \"always normal.\"     Review of Systems   Constitutional:  Negative for chills, diaphoresis and fever.   Gastrointestinal:  Positive for abdominal pain, diarrhea and nausea. Negative for blood in stool and vomiting.     No LMP for male patient.    Patient Care Team:  Lacho Hart DO as PCP - General (Family Medicine)  HUMBERTO Quesada-CNP as Nurse Practitioner (Cardiology)  Ada Langley MD as Surgeon (Urology)  Apolonia Larry MD as Referring Physician (Endocrinology)    Current Outpatient Medications   Medication Instructions    amLODIPine (NORVASC) 10 mg, oral, Daily    blood-glucose meter (OneTouch Ultra2 Meter) misc USE TO CHECK BLOOD SUGAR    clonazePAM (KLONOPIN) 1 mg, 2 times daily PRN    escitalopram (LEXAPRO) 15 mg, oral, Daily    fluticasone (Flonase) 50 mcg/actuation nasal spray 2 sprays, Each Nostril, Daily, USE 1 SPRAY EACH NOSTRIL TWICE DAILY - OR - USE 2 SPRAYS EACH NOSTRIL ONCE DAILY.    hydrOXYzine HCL (ATARAX) 12.5-25 mg, oral, 3 times daily PRN    lancets (OneTouch UltraSoft Lancets) misc Check BG as needed for hypoglycemia    OneTouch Ultra Test strip CHECK BLOOD GLUCOSE AS NEEDED FOR SYMPTOMS OF LOW BLOOD SUGAR    OneTouch Ultra2 Meter misc USE TO CHECK BLOOD SUGAR    propranolol (INDERAL) 20 mg, oral, 2 times daily    sertraline (ZOLOFT) 100 mg, oral, Daily    tadalafil (CIALIS) 20 mg, oral, As needed    tadalafil (CIALIS) 5 mg, Daily " PRN    traZODone (DESYREL) 50 mg, oral, Nightly PRN       Objective   /65   Pulse 65   Wt 88.5 kg (195 lb 2 oz)   SpO2 95%   BMI 27.21 kg/m²           Physical Exam  Vitals and nursing note reviewed.   Constitutional:       General: He is not in acute distress.     Appearance: Normal appearance.      Comments: No assistive device presently being used.   HENT:      Head: Normocephalic and atraumatic.   Eyes:      General: No scleral icterus.     Extraocular Movements: Extraocular movements intact.      Conjunctiva/sclera: Conjunctivae normal.   Pulmonary:      Effort: Pulmonary effort is normal. No respiratory distress.   Abdominal:      General: Bowel sounds are normal. There is no distension.      Palpations: Abdomen is soft. There is no hepatomegaly, splenomegaly or mass.      Tenderness: There is abdominal tenderness (epigastric more mild diffusely). There is no guarding or rebound.   Skin:     General: Skin is warm and dry.      Coloration: Skin is not jaundiced.   Neurological:      Mental Status: He is alert and oriented to person, place, and time. Mental status is at baseline.   Psychiatric:         Behavior: Behavior normal.         Assessment & Plan  Epigastric pain  r/o pancreatitis, acute hepatitis.  Orders:    CBC; Future    Comprehensive Metabolic Panel; Future    Lactate; Future    Lipase; Future    CT abdomen pelvis w IV contrast; Future    Cirrhosis of liver without ascites, unspecified hepatic cirrhosis type (Multi)  Keep appointment with hepatology.       Hypogonadism in male  Was too late for his endocrinology follow-up appointment. Did not start CPAP or follow-up with sleep medicine. Advised follow-up with sleep medicine, and endocrinology. May benefit from HRT for energy and muscle mass/strength, but necessary to address his severe RADHA.

## 2025-01-03 NOTE — ASSESSMENT & PLAN NOTE
r/o pancreatitis, acute hepatitis.  Orders:    CBC; Future    Comprehensive Metabolic Panel; Future    Lactate; Future    Lipase; Future    CT abdomen pelvis w IV contrast; Future

## 2025-01-03 NOTE — ASSESSMENT & PLAN NOTE
Was too late for his endocrinology follow-up appointment. Did not start CPAP or follow-up with sleep medicine. Advised follow-up with sleep medicine, and endocrinology. May benefit from HRT for energy and muscle mass/strength, but necessary to address his severe RADHA.

## 2025-01-03 NOTE — PATIENT INSTRUCTIONS
Please return for a(n) weight and medication follow-up appointment in 3 months, after tests to review results and options, earlier if any question or concern. Please return or seek medical attention if symptoms persist, change, worsen, or return. For emergencies, call 9-1-1 or go to the nearest Emergency Room. Please schedule additional problem-focused appointment(s) to address additional problem(s).    Avoid taking Biotin (a vitamin, shows up particularly in hair/nail supplements) for a week prior to any blood tests, as it can interfere with certain results. Fasting for labs means 12 hours, nothing to eat or drink, except water and medications, unless directed otherwise.    For assistance with scheduling referrals or consultations, please call [phone]. For laboratory, radiology, and other tests, please call [phone] ([phone] for pediatrics). Please review prescription inserts and published information for possible adverse effects of all medications. Return after testing or consultation to review results and recommendations, if symptoms persist, change, worsen, or return, or if you have any question or concern. If you do not get results within 7-10 days, or you have any question or concern, please send a message, call the office ([phone]), or return to the office for a follow-up appointment. For non-emergencies, you may call the office. For emergencies, call 9-1-1 or go to the nearest Emergency Department. Please schedule additional appointment(s) to address concern(s) not addressed today. An annual Wellness visit is strongly recommended. A Wellness visit should be dedicated to addressing routine health maintenance matters (e.g., cancer screenings, cardiovascular screening, etc.). Problem-focused visits, typically prompted by symptoms or specific concerns, are usually conducted separately, particularly if multiple or complex problems need to be addressed.    In general, results are not released  or discussed over the telephone, but at an appointment or via  [employer]. Results of tests done through [facility] are released via  "[employer] (see below).  [email or web address]   "[employer] support line: [phone]

## 2025-01-03 NOTE — PROGRESS NOTES
"Collaborative Care (CoCM)  Progress Note    Type of Interaction: In Office    Start Time: 2:00pm    End Time: 3:00pm        Appointment: Scheduled    Reason for Visit:   Chief Complaint   Patient presents with    Anxiety     Anxiety with depression    Today, Clifford reports that he went to recent doctor visit and was told that his anemia is worsening and may be related to fatty lever disease/enlarged spleen. He has been referred now to a liver specialist. He reports some very bad days still re: mental healthy, tearfulness. He does acknowledge that his trazodone is still working, which he feels very good about. Generally taking 75mg. Night sweats are gone, and it feels easier for him to get out of bed. Weight still dropping. Feels his antidepressant is only somewhat helpful. Reports past use of only Wellbutrin (caused \"rage) and Zoloft. Processing past trauma, including re: mother, who was a severe hoarder.       Interval History / Patient Symptoms:     No data recorded      Interventions Provided: Psychoeducation, Behavioral Activation, Interpersonal Therapy, Solution Focused Therapy, Strengths Exploration, Grief Work, and Develop Coping Strategies      Progress Made: Mixed    Response to Intervention: Increasingly showing wider range of affect, trust increasing and more engaged in sessions.        Plan:     Care Plan    There is no care plan documentation to display.         There are no Patient Instructions on file for this visit.      Follow Up / Next Appointment:  2 weeks      "

## 2025-01-06 ENCOUNTER — APPOINTMENT (OUTPATIENT)
Dept: PRIMARY CARE | Facility: CLINIC | Age: 60
End: 2025-01-06
Payer: COMMERCIAL

## 2025-01-08 NOTE — PROGRESS NOTES
[facility]  Followup Visit Note        HISTORY OF PRESENT ILLNESS   Clifford Recinos is a 59 y.o. male who presents to a [facility] for followup.       Current History    Reports sleeping better with trazodone and melatonin. Less daytime sleepiness, only occasional mini naps in afternoon when stressed. Previously had severe depression and anxiety with night sweats, sleeping only 1-2 hours per night.    Reports improved sleep initiation and maintenance. When waking, able to return to sleep faster.    Wanting to start testosterone. Needs RDAHA treated or to verify doesn't have prior to starting.    His home sleep study 6/24/2024 was consistent with severe sleep apnea with AHI of 37 and SpO2 chacha of 83%. His weight was 104 kg at this time.    Symptoms of sleep apnea previously included daytime sleepiness, waking up at night and waking up with headaches. Now feels daytime sleepiness is better although still present. Waking is better. Not waking up with headaches.    Previous visit 07/18/2024  OBSTRUCTIVE SLEEP APNEA / SLEEPINESS / FREQUENT WAKING  -Reviewed test results with patient  -Will order new CPAP from [facility] with pressure 4-14 cm H2O  -Discussed mask options and common tolerance issues  -Goal of CPAP includes less daytime sleepiness, less waking at night, better BP control    BMI>30  -Body mass index is 31.94 kg/m².  today  -With sufficient weight loss may no longer require treatment for RAHDA    HYPERTENSION  BP Readings from Last 3 Encounters:   07/18/24 139/79   07/15/24 131/77   07/12/24 142/71      -Well controlled with current medications     Followup 31-90 days after starting CPAP      PAP Adherence:      Sleep Scales:  ESS: 7     REVIEW OF SYSTEMS    All other systems negative      PHYSICAL EXAM     VITAL SIGNS: /65   Pulse 51   Ht 1.829 m (6')   Wt 88.5 kg (195 lb)   SpO2 98%   BMI 26.45 kg/m²      PREVIOUS WEIGHTS:  Wt Readings from Last 3  Encounters:   01/09/25 88.5 kg (195 lb)   01/03/25 88.5 kg (195 lb 2 oz)   12/16/24 90.5 kg (199 lb 6.5 oz)       Constitutional: Alert and oriented, cooperative, no obvious distress   HEENT: Non icteric or anemic, EOM WNL bilaterally   Neck: Supple, no JVD, no goiter, no adenopathy, no rigidity  Extremities: No clubbing, no LL edema   Neuromuscular: Cranial nerves grossly intact, no focal deficits     RESULTS/DATA     Iron (ug/dL)   Date Value   12/16/2024 129   06/24/2024 91   12/28/2023 118     % Saturation (%)   Date Value   12/16/2024 37   06/24/2024 24 (L)   12/28/2023 36     TIBC (ug/dL)   Date Value   12/16/2024 346   06/24/2024 382   12/28/2023 325     Ferritin (ng/mL)   Date Value   12/16/2024 116   06/24/2024 44   12/28/2023 236         ASSESSMENT/PLAN     Mr. Recinos is a 59 y.o. male and returns in followup for the following issues:    OBSTRUCTIVE SLEEP APNEA / HYPOGONADISM  -Will repeat home sleep study to assess if resolved/improved with weight loss (lost 31 lbs)  -Discussed nasal mask option if CPAP needed  -Will order CPAP if moderate or worse RADHA found on study  -Counseled on watching TV/reading with mask initially to acclimate if needed    HYPERTENSION  BP Readings from Last 3 Encounters:   01/09/25 143/65   01/03/25 124/65   12/16/24 151/72      -elevated today, states controlled at home    BMI>25  -Body mass index is 26.45 kg/m².  Today  -Significant weight loss noted (31 lbs), unintentional  -May be medication related (switched from Zoloft to Lexapro)    Followup 3 weeks after sleep study to review results

## 2025-01-09 ENCOUNTER — OFFICE VISIT (OUTPATIENT)
Dept: SLEEP MEDICINE | Facility: CLINIC | Age: 60
End: 2025-01-09
Payer: MEDICARE

## 2025-01-09 ENCOUNTER — DOCUMENTATION (OUTPATIENT)
Dept: BEHAVIORAL HEALTH | Facility: CLINIC | Age: 60
End: 2025-01-09
Payer: MEDICARE

## 2025-01-09 VITALS
WEIGHT: 195 LBS | OXYGEN SATURATION: 98 % | HEART RATE: 51 BPM | DIASTOLIC BLOOD PRESSURE: 65 MMHG | HEIGHT: 72 IN | BODY MASS INDEX: 26.41 KG/M2 | SYSTOLIC BLOOD PRESSURE: 143 MMHG

## 2025-01-09 DIAGNOSIS — E29.1 HYPOGONADISM IN MALE: ICD-10-CM

## 2025-01-09 DIAGNOSIS — G47.33 OSA (OBSTRUCTIVE SLEEP APNEA): Primary | ICD-10-CM

## 2025-01-09 DIAGNOSIS — G47.19 EXCESSIVE DAYTIME SLEEPINESS: ICD-10-CM

## 2025-01-09 DIAGNOSIS — G47.00 PERSISTENT DISORDER OF INITIATING OR MAINTAINING SLEEP: ICD-10-CM

## 2025-01-09 RX ORDER — ACETAMINOPHEN 500 MG
5 TABLET ORAL NIGHTLY
COMMUNITY

## 2025-01-09 ASSESSMENT — SLEEP AND FATIGUE QUESTIONNAIRES
SITING INACTIVE IN A PUBLIC PLACE LIKE A CLASS ROOM OR A MOVIE THEATER: SLIGHT CHANCE OF DOZING
HOW LIKELY ARE YOU TO NOD OFF OR FALL ASLEEP WHILE SITTING AND TALKING TO SOMEONE: WOULD NEVER DOZE
HOW LIKELY ARE YOU TO NOD OFF OR FALL ASLEEP WHILE LYING DOWN TO REST IN THE AFTERNOON WHEN CIRCUMSTANCES PERMIT: MODERATE CHANCE OF DOZING
HOW LIKELY ARE YOU TO NOD OFF OR FALL ASLEEP IN A CAR, WHILE STOPPED FOR A FEW MINUTES IN TRAFFIC: WOULD NEVER DOZE
HOW LIKELY ARE YOU TO NOD OFF OR FALL ASLEEP WHILE SITTING AND READING: SLIGHT CHANCE OF DOZING
HOW LIKELY ARE YOU TO NOD OFF OR FALL ASLEEP WHEN YOU ARE A PASSENGER IN A CAR FOR AN HOUR WITHOUT A BREAK: SLIGHT CHANCE OF DOZING
HOW LIKELY ARE YOU TO NOD OFF OR FALL ASLEEP WHILE SITTING QUIETLY AFTER LUNCH WITHOUT ALCOHOL: SLIGHT CHANCE OF DOZING
HOW LIKELY ARE YOU TO NOD OFF OR FALL ASLEEP WHILE WATCHING TV: SLIGHT CHANCE OF DOZING
ESS-CHAD TOTAL SCORE: 7

## 2025-01-09 ASSESSMENT — LIFESTYLE VARIABLES
HOW OFTEN DO YOU HAVE SIX OR MORE DRINKS ON ONE OCCASION: NEVER
AUDIT-C TOTAL SCORE: 1
HOW MANY STANDARD DRINKS CONTAINING ALCOHOL DO YOU HAVE ON A TYPICAL DAY: 1 OR 2
HOW OFTEN DO YOU HAVE A DRINK CONTAINING ALCOHOL: MONTHLY OR LESS
SKIP TO QUESTIONS 9-10: 1

## 2025-01-09 ASSESSMENT — PATIENT HEALTH QUESTIONNAIRE - PHQ9
SUM OF ALL RESPONSES TO PHQ9 QUESTIONS 1 & 2: 0
1. LITTLE INTEREST OR PLEASURE IN DOING THINGS: NOT AT ALL
2. FEELING DOWN, DEPRESSED OR HOPELESS: NOT AT ALL

## 2025-01-09 ASSESSMENT — PAIN SCALES - GENERAL: PAINLEVEL_OUTOF10: 0-NO PAIN

## 2025-01-09 NOTE — PATIENT INSTRUCTIONS
Thank you for coming to the Sleep Medicine Clinic today! Your sleep medicine provider today was: Israel Washington PA-C Below is a summary of your treatment plan, other important information, and our contact numbers:      TREATMENT PLAN     Call [phone]), option 3 to schedule your sleep study. When you have an appointment please call us back at [phone] to schedule a followup appointment 3-4 weeks after to review results.    Obstructive Sleep Apnea (RADHA) is a sleep disorder where your upper airway muscles relax during sleep and the airway intermittently and repetitively narrows and collapses leading to partially blocked airway (hypopnea) or completely blocked airway (apnea) which, in turn, can disrupt breathing in sleep, lower oxygen levels while you sleep and cause night time wakings. Because both apnea and hypopnea may cause higher carbon dioxide or low oxygen levels, untreated RADHA can lead to heart arrhythmia, elevation of blood pressure, and make it harder for the body to consolidate memory and facilitate metabolism (leading to higher blood sugars at night). Frequent partial arousals occur during sleep resulting in sleep deprivation and daytime sleepiness. RADHA is associated with an increased risk of cardiovascular disease, stroke, hypertension, and insulin resistance. Moreover, untreated RADHA with excessive daytime sleepiness can increase the risk of motor vehicular accidents.    Some conservative strategies for RADHA regardless of RADHA severity are:   Positional therapy - Avoid sleeping on your back.   Healthy diet and regular exercise to optimize weight is highly encouraged.   Avoid alcohol late in the evening and sedative-hypnotics as these substances can make sleep apnea worse.   Improve breathing through the nose with intranasal steroid spray, saline rinse, or antihistamines    Safety: Avoid driving vehicle and operating heavy equipment while sleepy. Drowsy driving may lead to life-threatening  motor vehicle accidents. A person driving while sleepy is 5 times more likely to have an accident. If you feel sleepy, pull over and take a short power nap (sleep for less than 30 minutes). Otherwise, ask somebody to drive you.    Treatment options for sleep apnea include weight management, positional therapy, Positive Airway Therapy (PAP) therapy, oral appliance therapy, hypoglossal nerve stimulator (Inspire) and select airway surgeries.      OUR SLEEP TESTING LOCATIONS     Our team will contact you to schedule your sleep study, however, you can contact us as follow:  Main Phone Line (scheduling only): [phone]), option 3  Adult and Pediatric Locations  [facility] (6 years and older): [facility] - 4th floor ([address]) After hours line: [phone]  [facility] (Main campus: All ages): [facility], 6th floor. After hours line: [phone]   [place] (18 years and older): [address] [facility], 2nd floor   [place] (18 years and older): [address] [facility]; 4th floor  After hours line: [phone]  [facility] (18 years and older) at [place]: [address] [facility]  After hours line: [phone]    [place] (5 years and older; younger considered on case-by-case basis): [address]; [address], [address]. Scheduling  After hours line: [phone]   [place] (6 years and older): [address]; [address]   [place] (6 years and older): [address] [facility], [address]  After hours line: [phone]   [place] (13 years and older) in [place]: [address], 2nd floor  After hours line: [phone]   [place] (13 year and older): [address]  After hours line: [phone]      IMPORTANT PHONE NUMBERS     Sleep Medicine Clinic Fax: [phone]  Appointments (for Adult Sleep Clinic): [phone] - option 2  Appointments (For Sleep Studies):  "[phone] - option 3  Behavioral Sleep Medicine: [phone]    [facility] (One Touch EMR): [phone]  For clinical questions and refilling prescriptions: [phone]  [facility] (For Bao/[place]): P: [phone]  F: [phone]       CONTACTING YOUR SLEEP MEDICINE PROVIDER     Send a message directly to your provider through \"My Chart\", which is the email service through your  Records Account: [email or web address]   Call [phone] and leave a message. One of the administrative assistants will forward the message to your sleep medicine provider through \"My Chart\" and/or email.     Your sleep medicine provider for this visit was: Israel Washington PA-C  "

## 2025-01-09 NOTE — PROGRESS NOTES
[facility] Psychiatry Follow-Up Note     Clifford Recinos is a 59 y.o., referred to Collaborative Care for additional psychiatric recommendations due to ongoing sleeping problems. I have reviewed the patient with the behavioral health manager and reviewed the patient's electronic record. Patient was previously discussed in collaborative care in November 2024, at which time recommendation was made to continue Lexapro, trial trazodone, and continue hydroxyzine.     He is being connected with GI, still following with endocrine, and has been referred to psychiatry.  He has had some improvement with sleep on 50mg, but does not feel the dose is sufficient and is open to trying 75mg nightly to help with sleep.      No data recorded for PHQ/ALVARADO today.    Recommendations:   Behavioral health manager ([facility]) to continue to engage with patient   Medications:   Continue Lexapro 10mg daily for mood and anxiety  Continue hydroxyzine 25mg daily prn anxiety  Increase to trazodone 75mg at bedtime for sleep (1.5tabs of 50mg)  Recommendations otherwise as per prior note (11/7)     The above treatment considerations and suggestions are based on consultations with the patient's care manager and a review of information available in the electronic medical record. I have not personally examined the patient. All recommendations should be implemented with consideration of the patient's relevant prior history and current clinical status. Please feel free to reach out via Epic staff messaging with any questions about the care of this patient.

## 2025-01-10 ENCOUNTER — DOCUMENTATION WITH CHARGES (OUTPATIENT)
Dept: PRIMARY CARE | Facility: CLINIC | Age: 60
End: 2025-01-10
Payer: MEDICARE

## 2025-01-10 DIAGNOSIS — F41.8 DEPRESSION WITH ANXIETY: Primary | ICD-10-CM

## 2025-01-13 DIAGNOSIS — G47.00 INSOMNIA, UNSPECIFIED TYPE: ICD-10-CM

## 2025-01-13 RX ORDER — TRAZODONE HYDROCHLORIDE 50 MG/1
75 TABLET ORAL NIGHTLY PRN
Qty: 150 TABLET | Refills: 0 | Status: SHIPPED | OUTPATIENT
Start: 2025-01-13

## 2025-01-17 ENCOUNTER — APPOINTMENT (OUTPATIENT)
Dept: BEHAVIORAL HEALTH | Facility: CLINIC | Age: 60
End: 2025-01-17
Payer: COMMERCIAL

## 2025-01-17 VITALS
HEIGHT: 72 IN | BODY MASS INDEX: 26.14 KG/M2 | WEIGHT: 193 LBS | SYSTOLIC BLOOD PRESSURE: 133 MMHG | HEART RATE: 60 BPM | DIASTOLIC BLOOD PRESSURE: 75 MMHG | TEMPERATURE: 98.2 F

## 2025-01-17 DIAGNOSIS — F33.1 MAJOR DEPRESSIVE DISORDER, RECURRENT, MODERATE: Primary | ICD-10-CM

## 2025-01-17 DIAGNOSIS — F41.9 ANXIETY: ICD-10-CM

## 2025-01-17 DIAGNOSIS — F41.1 GAD (GENERALIZED ANXIETY DISORDER): ICD-10-CM

## 2025-01-17 RX ORDER — ESCITALOPRAM OXALATE 20 MG/1
20 TABLET ORAL DAILY
COMMUNITY
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
## 2025-01-19 ENCOUNTER — PROCEDURE VISIT (OUTPATIENT)
Dept: SLEEP MEDICINE | Facility: HOSPITAL | Age: 60
End: 2025-01-19
Payer: MEDICARE

## 2025-01-19 DIAGNOSIS — G47.33 OSA (OBSTRUCTIVE SLEEP APNEA): ICD-10-CM

## 2025-01-21 ENCOUNTER — APPOINTMENT (OUTPATIENT)
Dept: PRIMARY CARE | Facility: CLINIC | Age: 60
End: 2025-01-21
Payer: MEDICARE

## 2025-01-22 ENCOUNTER — DOCUMENTATION (OUTPATIENT)
Dept: BEHAVIORAL HEALTH | Facility: CLINIC | Age: 60
End: 2025-01-22
Payer: MEDICARE

## 2025-01-22 NOTE — PROGRESS NOTES
Nonbillable note : left patient a message to discuss how things are going with medication psychotropic regimen , to clarify plan moving forward for clonazepam . Patient also needs to follow up with seeing pcp and cardiac provider , and monitor blood pressure and pulse a few times a week . Kpacer cns

## 2025-01-22 NOTE — PROGRESS NOTES
Nonbillable note : communicated with casey lang, related to referral for patient and patient needs for care coordination . Reviewed oarrs , will encourage patient to follow up with cardiac and pcp as planned . Will coordinate care about starting buspirone for anxiety maintenance med , and will continue low dose clonazepam with review risks with patient and his wife , as his wife was present in the appointment . As of now we dont have records from [facility] , where he had previous psych provider. Oarrs shows the most recent clonazepam script as filled in November . Kpacer cns

## 2025-01-26 NOTE — PROGRESS NOTES
Collaborative Care (CoCM)  Progress Note    Type of Interaction: In Office    Start Time: 2:00    End Time: 3:00        Appointment: Scheduled    Reason for Visit:   Chief Complaint   Patient presents with    Depression     Depression with anxiety    Today, Clifford is quite upset and frustrated due to many factors. He reports that he was ten minutes late for his endocrinologist appointment due to road construction, and he was told he was too late. His PCP not willing to prescribe testosterone, and Clifford felt he was treated in unfair manner and would like to change providers. He reports that his sleep has improved on the trazodone but requesting an increase to 75mg, as that seems to be the most helpful. He continues to c/o weight loss, muscle atrophy, loss of energy, and anxiety upon awakening. He has an appointment tomorrow with  psychiatry to see about a medication change. Spent much of the session assisting him with appointments and making sure his other providers (including sleep medicine) are informed about what is going on with him. Will assist with referral to new endocrinologist.       Interval History / Patient Symptoms:     No data recorded      Interventions Provided: Psychoeducation, Problem Solving Treatment, Interpersonal Therapy, Motivational Interviewing, Solution Focused Therapy, and Develop Coping Strategies      Progress Made: Minimum    Response to Intervention: Still tremulous, more articulate about struggles, feels comfortable in sessions to share his issues.         Plan:     Care Plan    There is no care plan documentation to display.         There are no Patient Instructions on file for this visit.      Follow Up / Next Appointment:  Doc carrizales

## 2025-01-28 ENCOUNTER — DOCUMENTATION (OUTPATIENT)
Dept: BEHAVIORAL HEALTH | Facility: CLINIC | Age: 60
End: 2025-01-28
Payer: MEDICARE

## 2025-01-28 ENCOUNTER — TELEPHONE (OUTPATIENT)
Dept: BEHAVIORAL HEALTH | Facility: CLINIC | Age: 60
End: 2025-01-28
Payer: MEDICARE

## 2025-01-28 DIAGNOSIS — Z79.899 LONG-TERM CURRENT USE OF BENZODIAZEPINE: ICD-10-CM

## 2025-01-28 DIAGNOSIS — F41.1 GAD (GENERALIZED ANXIETY DISORDER): ICD-10-CM

## 2025-01-28 RX ORDER — BUSPIRONE HYDROCHLORIDE 5 MG/1
5 TABLET ORAL 2 TIMES DAILY
Qty: 60 TABLET | Refills: 0 | Status: SHIPPED | OUTPATIENT
Start: 2025-01-28 | End: 2025-02-27

## 2025-01-28 RX ORDER — CLONAZEPAM 0.5 MG/1
0.25 TABLET ORAL 2 TIMES DAILY
COMMUNITY

## 2025-01-28 NOTE — PROGRESS NOTES
Nonbillable note : asked staff to send a release of information for continuity of care with dr jill winslow last few most recent psych visit notes and psych eval . Kpacer cns

## 2025-01-28 NOTE — PROGRESS NOTES
Nonbillable note : spoke to patient and his wife about starting buspirone , for maintenance med for anxiety  , reviewed potential benefits and potential side effects and advised to take with food to decrease the chance of dizziness. They agree to obtain uds for consideration of clonazepam though discussed with this not being a currently documented medication per oarrs ( shows most recent script as November 2023) it is recommended to have that at the least effective dose , and not a long term plan for the medication  pending the response to buspar , he is taking clonazepam 0.5 mg , 1/2 tablet twice a day , per an old prescription from his previous prescriber in psychiatry , dr winslow at [place] , most recent appointment was in novewmber 2023 . patient has enough medication for the clonazepam for at least one week per his report. He is very anxious . Does not have substance abuse concerns and I dont have current safety concerns with how he has been taking the clonazepam , has seen pcp regularly .  Kpacer cns

## 2025-01-28 NOTE — PROGRESS NOTES
Nonbillable note : sent order for urine drug screen to laboratory , patient is aware of need for lab to be done .kpacer cns    CKD (chronic kidney disease), stage IV

## 2025-02-02 NOTE — PROGRESS NOTES
Collaborative Care (CoCM)  Progress Note    Type of Interaction: In Office    Start Time: 2:00    End Time: 3:00        Appointment: Scheduled    Reason for Visit:   Chief Complaint   Patient presents with    Anxiety     Anxiety with depression    Today, Clifford reports that his new behavioral health provider from  has not prescribed him Klonopin, she is requesting that he get a UDS and is concerned that he has been taking an old prescription. This  reached out through Epic to provider to discuss case (Shani Camarillo), she agreed to reach out to patient and his wife to discuss starting Klonopin in further detail. Clifford states he is sleeping better on the 75mg of Trazodone. Began to use some basic CBT interventions in conjunction with supportive interventions. Discussed his ongoing concerns with his mother's home, which is across the street from him, and the fact that his brother, who is financial POA, is not making any efforts to take care of the home.      Interval History / Patient Symptoms:     No data recorded      Interventions Provided: [place] Setting, Psychoeducation, Problem Solving Treatment, Behavioral Activation, Interpersonal Therapy, Solution Focused Therapy, Communication Training, Develop Coping Strategies, and CBT      Progress Made: Minimum    Response to Intervention: Clifford remains highly anxious but slightly less so, less tremulous, does feel comfortable opening up in sessions        Plan:     Care Plan    There is no care plan documentation to display.         There are no Patient Instructions on file for this visit.      Follow Up / Next Appointment:  2 weeks

## 2025-02-04 ENCOUNTER — APPOINTMENT (OUTPATIENT)
Dept: PRIMARY CARE | Facility: CLINIC | Age: 60
End: 2025-02-04
Payer: MEDICARE

## 2025-02-08 ENCOUNTER — DOCUMENTATION (OUTPATIENT)
Dept: BEHAVIORAL HEALTH | Facility: CLINIC | Age: 60
End: 2025-02-08
Payer: MEDICARE

## 2025-02-08 LAB
AMPHETAMINES UR QL: NEGATIVE NG/ML
BARBITURATES UR QL: NEGATIVE NG/ML
BENZODIAZ UR QL: NEGATIVE NG/ML
BZE UR QL: NEGATIVE NG/ML
CREAT UR-MCNC: 164.2 MG/DL
METHADONE UR QL: NEGATIVE NG/ML
OPIATES UR QL: NEGATIVE NG/ML
OXIDANTS UR QL: NEGATIVE MCG/ML
OXYCODONE UR QL: NEGATIVE NG/ML
PCP UR QL: NEGATIVE NG/ML
PH UR: 5.5 [PH] (ref 4.5–9)
QUEST NOTES AND COMMENTS: NORMAL
THC UR QL: NEGATIVE NG/ML

## 2025-02-08 NOTE — PROGRESS NOTES
Nonbillable note : reviewed urine drug screen , will call patient on Tuesday about medication regimen kpacercns

## 2025-02-09 ENCOUNTER — DOCUMENTATION WITH CHARGES (OUTPATIENT)
Dept: PRIMARY CARE | Facility: CLINIC | Age: 60
End: 2025-02-09
Payer: MEDICARE

## 2025-02-09 DIAGNOSIS — F41.8 DEPRESSION WITH ANXIETY: Primary | ICD-10-CM

## 2025-02-10 ENCOUNTER — APPOINTMENT (OUTPATIENT)
Dept: PRIMARY CARE | Facility: CLINIC | Age: 60
End: 2025-02-10
Payer: COMMERCIAL

## 2025-02-11 ENCOUNTER — OFFICE VISIT (OUTPATIENT)
Dept: PRIMARY CARE | Facility: CLINIC | Age: 60
End: 2025-02-11
Payer: MEDICARE

## 2025-02-11 VITALS
OXYGEN SATURATION: 97 % | WEIGHT: 193 LBS | HEART RATE: 57 BPM | BODY MASS INDEX: 26.18 KG/M2 | SYSTOLIC BLOOD PRESSURE: 132 MMHG | DIASTOLIC BLOOD PRESSURE: 80 MMHG

## 2025-02-11 DIAGNOSIS — I49.3 MULTIPLE PREMATURE VENTRICULAR COMPLEXES: ICD-10-CM

## 2025-02-11 DIAGNOSIS — I10 ESSENTIAL HYPERTENSION: ICD-10-CM

## 2025-02-11 DIAGNOSIS — E29.1 HYPOGONADISM IN MALE: ICD-10-CM

## 2025-02-11 DIAGNOSIS — G47.33 OSA (OBSTRUCTIVE SLEEP APNEA): ICD-10-CM

## 2025-02-11 DIAGNOSIS — K74.60 CIRRHOSIS OF LIVER WITHOUT ASCITES, UNSPECIFIED HEPATIC CIRRHOSIS TYPE (MULTI): ICD-10-CM

## 2025-02-11 DIAGNOSIS — Z00.00 ROUTINE ADULT HEALTH MAINTENANCE: Primary | ICD-10-CM

## 2025-02-11 DIAGNOSIS — M54.59 INTRACTABLE LOW BACK PAIN: ICD-10-CM

## 2025-02-11 DIAGNOSIS — F41.9 ANXIETY AND DEPRESSION: ICD-10-CM

## 2025-02-11 DIAGNOSIS — F32.A ANXIETY AND DEPRESSION: ICD-10-CM

## 2025-02-11 DIAGNOSIS — R68.82 REDUCED LIBIDO: ICD-10-CM

## 2025-02-11 ASSESSMENT — ENCOUNTER SYMPTOMS
FEVER: 0
FATIGUE: 0
ABDOMINAL PAIN: 0
NAUSEA: 0
JOINT SWELLING: 0
CHILLS: 0
DIARRHEA: 0
DIZZINESS: 0
HEADACHES: 0
COLOR CHANGE: 0
TROUBLE SWALLOWING: 0
TREMORS: 1
SHORTNESS OF BREATH: 0
BACK PAIN: 1
WHEEZING: 0
MYALGIAS: 0
CONSTIPATION: 0
WEAKNESS: 0
COUGH: 0
ABDOMINAL DISTENTION: 0
EYE PAIN: 0
DIFFICULTY URINATING: 0
WOUND: 0
ADENOPATHY: 0
PALPITATIONS: 0
SEIZURES: 0
BRUISES/BLEEDS EASILY: 0

## 2025-02-11 ASSESSMENT — ANXIETY QUESTIONNAIRES
6. BECOMING EASILY ANNOYED OR IRRITABLE: NOT AT ALL
2. NOT BEING ABLE TO STOP OR CONTROL WORRYING: SEVERAL DAYS
IF YOU CHECKED OFF ANY PROBLEMS ON THIS QUESTIONNAIRE, HOW DIFFICULT HAVE THESE PROBLEMS MADE IT FOR YOU TO DO YOUR WORK, TAKE CARE OF THINGS AT HOME, OR GET ALONG WITH OTHER PEOPLE: SOMEWHAT DIFFICULT
7. FEELING AFRAID AS IF SOMETHING AWFUL MIGHT HAPPEN: SEVERAL DAYS
1. FEELING NERVOUS, ANXIOUS, OR ON EDGE: NEARLY EVERY DAY
3. WORRYING TOO MUCH ABOUT DIFFERENT THINGS: MORE THAN HALF THE DAYS
4. TROUBLE RELAXING: NEARLY EVERY DAY
GAD7 TOTAL SCORE: 13
5. BEING SO RESTLESS THAT IT IS HARD TO SIT STILL: NEARLY EVERY DAY

## 2025-02-11 ASSESSMENT — PATIENT HEALTH QUESTIONNAIRE - PHQ9
4. FEELING TIRED OR HAVING LITTLE ENERGY: NEARLY EVERY DAY
2. FEELING DOWN, DEPRESSED OR HOPELESS: MORE THAN HALF THE DAYS
9. THOUGHTS THAT YOU WOULD BE BETTER OFF DEAD, OR OF HURTING YOURSELF: NOT AT ALL
8. MOVING OR SPEAKING SO SLOWLY THAT OTHER PEOPLE COULD HAVE NOTICED. OR THE OPPOSITE, BEING SO FIGETY OR RESTLESS THAT YOU HAVE BEEN MOVING AROUND A LOT MORE THAN USUAL: SEVERAL DAYS
6. FEELING BAD ABOUT YOURSELF - OR THAT YOU ARE A FAILURE OR HAVE LET YOURSELF OR YOUR FAMILY DOWN: SEVERAL DAYS
3. TROUBLE FALLING OR STAYING ASLEEP OR SLEEPING TOO MUCH: MORE THAN HALF THE DAYS
SUM OF ALL RESPONSES TO PHQ QUESTIONS 1-9: 12
7. TROUBLE CONCENTRATING ON THINGS, SUCH AS READING THE NEWSPAPER OR WATCHING TELEVISION: MORE THAN HALF THE DAYS
5. POOR APPETITE OR OVEREATING: NOT AT ALL
1. LITTLE INTEREST OR PLEASURE IN DOING THINGS: SEVERAL DAYS
SUM OF ALL RESPONSES TO PHQ9 QUESTIONS 1 AND 2: 3
10. IF YOU CHECKED OFF ANY PROBLEMS, HOW DIFFICULT HAVE THESE PROBLEMS MADE IT FOR YOU TO DO YOUR WORK, TAKE CARE OF THINGS AT HOME, OR GET ALONG WITH OTHER PEOPLE: SOMEWHAT DIFFICULT

## 2025-02-11 NOTE — PATIENT INSTRUCTIONS
Please arrange for routine follow up in 3 months. You may schedule on-line through [email or web address] or call our office at [phone].

## 2025-02-11 NOTE — PROGRESS NOTES
Subjective   Patient ID: Clifford Recinos is a 59 y.o. male who presents for Anxiety and Depression.    Patient seen at home patient seen today for routine follow-up and medication management.  Present for patient's visit is his spouse.  Patient is currently working with several specialist due to some chronic health concerns.  This includes depression and anxiety.  Patient and spouse feel that anxiety is not currently well-controlled.  He was previously on and off Klonopin in the past for over 15 years and is hoping to resume this medication with his new psychiatric provider.  Patient reports that this medication was very beneficial with this anxiety but he continues to have issues with depression and grief over the loss of family.  He continues to follow routinely with psychiatry and counseling services.  Patient denies any current thoughts of self-harm or suicidal ideation.  Patient has a follow-up with GI in place due to a more recent diagnosis of fatty liver disease.  Spouse voices concerns that some of the medications patient has been on in the past may be harmful to his liver.  They would like to have his blood work checked routinely for monitoring purposes.  No reported issues with appetite, staying hydrated, bowel or bladder.  Patient is also scheduled to follow-up with endocrinology to discuss testosterone replacement therapy.  His levels have been very low for almost a year but he needed to follow-up with sleep medicine before he could be considered for hormone replacement.  Discussed most recent sleep study results as well as plans for follow-up with endocrinology within the next few months.  Patient and spouse feel that patient's incredibly low testosterone levels are causing systemic effects to patient.  Good support system reported.  Medications reviewed.  No other acute concerns voiced at this time.       Current Outpatient Medications on File Prior to Visit   Medication Sig Dispense Refill    amLODIPine  (Norvasc) 10 mg tablet Take 1 tablet (10 mg) by mouth once daily. (Patient taking differently: Take 0.5 tablets (5 mg) by mouth once daily. Patient says he is taking 5 mg , daily , due to dizziness at 10 mg , daily , advised him to discuss with pcp) 90 tablet 3    blood-glucose meter (OneTouch Ultra2 Meter) misc USE TO CHECK BLOOD SUGAR 1 each 0    escitalopram (Lexapro) 20 mg tablet Take 1 tablet (20 mg) by mouth once daily.      lancets (OneTouch UltraSoft Lancets) misc Check BG as needed for hypoglycemia 100 each 2    melatonin 5 mg tablet Take 1 tablet (5 mg) by mouth once daily at bedtime.      OneTouch Ultra Test strip CHECK BLOOD GLUCOSE AS NEEDED FOR SYMPTOMS OF LOW BLOOD SUGAR 100 strip 1    OneTouch Ultra2 Meter misc USE TO CHECK BLOOD SUGAR      [DISCONTINUED] clonazePAM (KlonoPIN) 0.5 mg tablet Take 0.5 tablets (0.25 mg) by mouth 2 times a day. Per patient report      [DISCONTINUED] traZODone (Desyrel) 50 mg tablet Take 1.5 tablets (75 mg) by mouth as needed at bedtime for sleep. 150 tablet 0    [DISCONTINUED] busPIRone (Buspar) 5 mg tablet Take 1 tablet (5 mg) by mouth 2 times a day. (Patient not taking: Reported on 2/11/2025) 60 tablet 0     No current facility-administered medications on file prior to visit.       Past Medical History:   Diagnosis Date    Anemia     Anxiety     Arthritis 2010    BPH (benign prostatic hyperplasia)     Depression     Fracture of fourth toe, left, closed, initial encounter 10/18/2019    Fracture of fourth toe, left, open, with routine healing, subsequent encounter 10/14/2019    Headache 06.21.24    History of blood transfusion     GI bleed   no reaction    Hypertension     Hypogonadism male     Other instability, left knee 05/02/2018    Other instability, left knee    Pain in left knee 05/02/2018    Left knee pain, unspecified chronicity    Pain in thoracic spine 02/21/2022    Thoracic spine pain    Palpitations     PVCs (premature ventricular contractions)     Restless  leg syndrome 2010    Scoliosis 2023    Sleep apnea     having home sleep study soon    Unspecified injury of thorax, initial encounter 02/21/2022    Rib injury    Vasovagal syncope     Visual impairment 2022    Wears glasses     Wedge compression fracture of unspecified thoracic vertebra, initial encounter for closed fracture (Multi) 02/23/2022    Thoracic compression fracture        Past Surgical History:   Procedure Laterality Date    APPENDECTOMY      CHOLECYSTECTOMY      COLONOSCOPY          Family History   Problem Relation Name Age of Onset    Other (CABG) Mother Natalia Recinos (bypass)     Heart disease Mother Natalia Recinos (bypass)     Other (cardiac pacemaker) Father Abisai Recinos     Other (malignant neoplasm of prostate) Father Abisai Recinos     Other (sepsis) Father Abisai Recinos     Other (stroke syndrome) Father Abisai Recinos     Depression Father Abisai Recinos     Mental illness Father Abisai Recinos     Other (PTCA) Sister Suzan Wang     Depression Sister Suzan Wang     Heart disease Sister Suzan Wang     Depression Brother Carlitos and Star     Hypertension Brother Carlitos and Star         Review of Systems   Constitutional:  Negative for chills, fatigue and fever.   HENT:  Negative for dental problem and trouble swallowing.    Eyes:  Negative for pain and visual disturbance.        Wears  glasses   Respiratory:  Negative for cough, shortness of breath and wheezing.    Cardiovascular:  Negative for chest pain, palpitations and leg swelling.   Gastrointestinal:  Negative for abdominal distention, abdominal pain, constipation, diarrhea and nausea.        Positive for IBS   Endocrine: Negative for cold intolerance and heat intolerance.   Genitourinary:  Negative for difficulty urinating.   Musculoskeletal:  Positive for back pain. Negative for gait problem, joint swelling and myalgias.   Skin:  Negative for color change, pallor, rash and wound.   Allergic/Immunologic: Negative for environmental allergies and food  allergies.   Neurological:  Positive for tremors. Negative for dizziness, seizures, weakness and headaches.   Hematological:  Negative for adenopathy. Does not bruise/bleed easily.   Psychiatric/Behavioral:  Negative for behavioral problems.         Positve for anxiety and depression   All other systems reviewed and are negative.      Objective   /80   Pulse 57   Wt 87.5 kg (193 lb)   SpO2 97%   BMI 26.18 kg/m²     Physical Exam  Constitutional:       General: He is not in acute distress.     Appearance: Normal appearance. He is not toxic-appearing.   HENT:      Head: Normocephalic and atraumatic.      Right Ear: Tympanic membrane, ear canal and external ear normal.      Left Ear: Tympanic membrane, ear canal and external ear normal.      Nose: Nose normal.      Mouth/Throat:      Mouth: Mucous membranes are moist.      Pharynx: Oropharynx is clear.   Eyes:      Extraocular Movements: Extraocular movements intact.      Conjunctiva/sclera: Conjunctivae normal.      Pupils: Pupils are equal, round, and reactive to light.   Cardiovascular:      Rate and Rhythm: Normal rate and regular rhythm.      Pulses: Normal pulses.      Heart sounds: Normal heart sounds. No murmur heard.  Pulmonary:      Effort: Pulmonary effort is normal.      Breath sounds: Normal breath sounds. No wheezing.   Abdominal:      General: Bowel sounds are normal.      Palpations: Abdomen is soft.   Musculoskeletal:         General: No swelling.      Cervical back: Normal range of motion and neck supple.   Skin:     General: Skin is warm and dry.   Neurological:      Mental Status: He is alert and oriented to person, place, and time. Mental status is at baseline.      Cranial Nerves: No cranial nerve deficit.      Motor: No weakness.      Comments: Positive for tremor   Psychiatric:         Mood and Affect: Mood normal.         Behavior: Behavior normal.         Thought Content: Thought content normal.         Judgment: Judgment normal.          Assessment/Plan   Problem List Items Addressed This Visit             ICD-10-CM    Anxiety and depression F41.9, F32.A     Patient to maintain routine follow-up with counseling and psychiatric services for continued treatment and medication recommendations.  Anxiety does not appear to be well-controlled at this time.  Good support system reported with spouse         Essential hypertension I10     Patient encouraged to continue current medication regiment.  Will continue to monitor vital signs at subsequent visits.  Provider to be notified for any new cardiac concerns.         Intractable low back pain M54.59     Consider pain management and/or orthopedic follow-up for any persistent/worsening pain concerns         Reduced libido R68.82     Now following with urology.  Patient only has 1 testicle (ruptured hernia as a baby)          Multiple premature ventricular complexes I49.3     Patient has followed in the past with cardiology for evaluation and treatment recommendations.  He appears to not tolerate beta-blockers well.  He is currently off of any antiarrhythmic/beta-blockers despite cardiology's most recent recommendations for propranolol.  He is to maintain routine follow-up with cardiology for continued recommendations regarding symptomatic PVCs         RADHA (obstructive sleep apnea) G47.33     At home sleep study completed earlier this year and showed moderately severe obstructive sleep apnea.  Follow-up in place with specialist to discuss additional testing/CPAP recommendations         Cirrhosis of liver without ascites (Multi) K74.60     Reviewed most recent results of imaging of liver with patient and spouse.  Will continue to monitor liver function routinely.  Patient to maintain follow-up with GI for continued evaluation and treatment recommendations         Hypogonadism in male E29.1     Patient has scheduled follow-up in place with endocrinology to discuss initiating hormone replacement therapy  given incredibly low testosterone levels         Relevant Orders    Testosterone, total and free    Routine adult health maintenance - Primary Z00.00     Most recent blood work reviewed.  Will continue to monitor as appropriate.

## 2025-02-12 ENCOUNTER — APPOINTMENT (OUTPATIENT)
Dept: PRIMARY CARE | Facility: CLINIC | Age: 60
End: 2025-02-12
Payer: MEDICARE

## 2025-02-12 ENCOUNTER — TELEPHONE (OUTPATIENT)
Dept: BEHAVIORAL HEALTH | Facility: CLINIC | Age: 60
End: 2025-02-12

## 2025-02-14 ENCOUNTER — APPOINTMENT (OUTPATIENT)
Dept: BEHAVIORAL HEALTH | Facility: CLINIC | Age: 60
End: 2025-02-14
Payer: MEDICARE

## 2025-02-14 DIAGNOSIS — G47.00 INSOMNIA, UNSPECIFIED TYPE: ICD-10-CM

## 2025-02-14 DIAGNOSIS — F41.1 GAD (GENERALIZED ANXIETY DISORDER): ICD-10-CM

## 2025-02-14 DIAGNOSIS — F33.1 MAJOR DEPRESSIVE DISORDER, RECURRENT, MODERATE: ICD-10-CM

## 2025-02-14 RX ORDER — TRAZODONE HYDROCHLORIDE 50 MG/1
TABLET ORAL
Qty: 180 TABLET | Refills: 1 | Status: SHIPPED | OUTPATIENT
Start: 2025-02-14

## 2025-02-14 RX ORDER — TRAZODONE HYDROCHLORIDE 50 MG/1
TABLET ORAL
Qty: 150 TABLET | Refills: 1 | Status: SHIPPED | OUTPATIENT
Start: 2025-02-14 | End: 2025-02-14

## 2025-02-14 RX ORDER — ESCITALOPRAM OXALATE 20 MG/1
20 TABLET ORAL DAILY
Qty: 90 TABLET | Refills: 1 | Status: SHIPPED | OUTPATIENT
Start: 2025-02-14 | End: 2025-02-14 | Stop reason: WASHOUT

## 2025-02-14 RX ORDER — MELATONIN 5 MG
5 CAPSULE ORAL
COMMUNITY

## 2025-02-14 RX ORDER — ESCITALOPRAM OXALATE 20 MG/1
20 TABLET ORAL DAILY
Qty: 90 TABLET | Refills: 1 | Status: SHIPPED | OUTPATIENT
Start: 2025-02-14 | End: 2025-02-14 | Stop reason: SDUPTHER

## 2025-02-14 RX ORDER — ESCITALOPRAM OXALATE 20 MG/1
20 TABLET ORAL DAILY
Qty: 90 TABLET | Refills: 1 | Status: SHIPPED | OUTPATIENT
Start: 2025-02-14 | End: 2025-05-15

## 2025-02-14 RX ORDER — MECOBALAMIN 5000 MCG
5 TABLET,DISINTEGRATING ORAL NIGHTLY PRN
Qty: 90 TABLET | Refills: 0 | Status: SHIPPED | OUTPATIENT
Start: 2025-02-14 | End: 2025-05-15

## 2025-02-14 RX ORDER — TRAZODONE HYDROCHLORIDE 50 MG/1
75 TABLET ORAL NIGHTLY PRN
Qty: 150 TABLET | Refills: 0 | Status: SHIPPED | OUTPATIENT
Start: 2025-02-14 | End: 2025-02-14

## 2025-02-14 RX ORDER — CLONAZEPAM 0.5 MG/1
0.25 TABLET ORAL 2 TIMES DAILY PRN
Qty: 60 TABLET | Refills: 2 | Status: SHIPPED | OUTPATIENT
Start: 2025-02-14 | End: 2025-03-16
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
## 2025-02-16 PROBLEM — R10.13 EPIGASTRIC PAIN: Status: RESOLVED | Noted: 2025-01-03 | Resolved: 2025-02-16

## 2025-02-16 PROBLEM — R04.0 EPISTAXIS: Status: RESOLVED | Noted: 2024-06-21 | Resolved: 2025-02-16

## 2025-02-16 PROBLEM — E16.2 HYPOGLYCEMIA: Status: RESOLVED | Noted: 2024-04-12 | Resolved: 2025-02-16

## 2025-02-16 PROBLEM — Z12.5 SCREENING FOR PROSTATE CANCER: Status: RESOLVED | Noted: 2023-08-03 | Resolved: 2025-02-16

## 2025-02-16 PROBLEM — R97.20 ABNORMAL PSA: Status: RESOLVED | Noted: 2024-02-06 | Resolved: 2025-02-16

## 2025-02-16 PROBLEM — Z11.59 NEED FOR HEPATITIS C SCREENING TEST: Status: RESOLVED | Noted: 2023-08-03 | Resolved: 2025-02-16

## 2025-02-16 PROBLEM — J06.9 UPPER RESPIRATORY TRACT INFECTION: Status: RESOLVED | Noted: 2024-06-21 | Resolved: 2025-02-16

## 2025-02-16 PROBLEM — I49.9 CARDIAC ARRHYTHMIA: Status: RESOLVED | Noted: 2024-05-22 | Resolved: 2025-02-16

## 2025-02-16 PROBLEM — B35.1 ONYCHOMYCOSIS: Status: RESOLVED | Noted: 2019-10-14 | Resolved: 2025-02-16

## 2025-02-16 PROBLEM — R73.9 HYPERGLYCEMIA: Status: RESOLVED | Noted: 2023-08-03 | Resolved: 2025-02-16

## 2025-02-16 PROBLEM — R53.83 FATIGUE: Status: RESOLVED | Noted: 2024-04-12 | Resolved: 2025-02-16

## 2025-02-16 PROBLEM — Z00.00 ROUTINE ADULT HEALTH MAINTENANCE: Status: ACTIVE | Noted: 2025-02-16

## 2025-02-16 PROBLEM — R00.2 PALPITATIONS: Status: RESOLVED | Noted: 2024-05-22 | Resolved: 2025-02-16

## 2025-02-16 PROBLEM — R51.9 HEADACHE: Status: RESOLVED | Noted: 2023-04-10 | Resolved: 2025-02-16

## 2025-02-17 NOTE — ASSESSMENT & PLAN NOTE
Patient encouraged to continue current medication regiment.  Will continue to monitor vital signs at subsequent visits.  Provider to be notified for any new cardiac concerns.

## 2025-02-17 NOTE — ASSESSMENT & PLAN NOTE
Patient has followed in the past with cardiology for evaluation and treatment recommendations.  He appears to not tolerate beta-blockers well.  He is currently off of any antiarrhythmic/beta-blockers despite cardiology's most recent recommendations for propranolol.  He is to maintain routine follow-up with cardiology for continued recommendations regarding symptomatic PVCs

## 2025-02-17 NOTE — ASSESSMENT & PLAN NOTE
At home sleep study completed earlier this year and showed moderately severe obstructive sleep apnea.  Follow-up in place with specialist to discuss additional testing/CPAP recommendations

## 2025-02-17 NOTE — ASSESSMENT & PLAN NOTE
Reviewed most recent results of imaging of liver with patient and spouse.  Will continue to monitor liver function routinely.  Patient to maintain follow-up with GI for continued evaluation and treatment recommendations

## 2025-02-17 NOTE — ASSESSMENT & PLAN NOTE
Patient has scheduled follow-up in place with endocrinology to discuss initiating hormone replacement therapy given incredibly low testosterone levels

## 2025-02-17 NOTE — ASSESSMENT & PLAN NOTE
Patient to maintain routine follow-up with counseling and psychiatric services for continued treatment and medication recommendations.  Anxiety does not appear to be well-controlled at this time.  Good support system reported with spouse

## 2025-02-20 NOTE — PROGRESS NOTES
Collaborative Care (CoCM)  Progress Note    Type of Interaction: In Office    Start Time: 2:00    End Time: 3:00        Appointment: Scheduled    Reason for Visit:   Chief Complaint   Patient presents with    Depression     Depression with anxiety    Clifford reports that he met with new PCP in the office and feel more comfortable. Notes abdominal pain not as bad, sleeping well. He still c/o losing strength and weight. Open to help finding new endocrinologist. Discussed issues with his brother,mother and the house she still owns that is across the street from his. Brother is financial POA but is not taking care of the property. Unsure if he wants to stay with psychiatric medication provider or switch to Dr. Anne, as she can see him in [place]; will make referral to her to see if she is able to work with him.       Interval History / Patient Symptoms:     Patient Health Questionnaire-9 Score: 12 (2/11/2025 12:30 PM)        Interventions Provided: Psychoeducation, Behavioral Activation, Interpersonal Therapy, Strengths Exploration, Values Exploration, Develop Coping Strategies, and Review Progress/Goals Stress Management      Progress Made: Mixed    Response to Intervention: Less tremulous, able to be more engaged/focused, feels antidepressant not as effective as he would like         Plan:     Care Plan    There is no care plan documentation to display.         There are no Patient Instructions on file for this visit.      Follow Up / Next Appointment:  2 weeks

## 2025-02-26 ENCOUNTER — APPOINTMENT (OUTPATIENT)
Dept: PRIMARY CARE | Facility: CLINIC | Age: 60
End: 2025-02-26
Payer: MEDICARE

## 2025-03-03 NOTE — PROGRESS NOTES
"Collaborative Care (CoCM)  Progress Note    Type of Interaction: In Office    Start Time: 200    End Time: 300        Appointment: Scheduled    Reason for Visit:   Chief Complaint   Patient presents with    Depression     Depression with anxiety    Today, Clifford reports that he has been back to his  psych provider and that she has agreed to put him on Klonopin .5 twice daily. He states he is \"not using it that much\", was urged to take the medications as prescribed. He notes that he is having a very hard time making decisions and his motivation is lower than ever. He c/o worsening fatigue and aches in legs. We did a mindfulness and grounding in the body exercise for when he gets extremely anxious, also gave him referral for another (closer) endocrinologist that he can hopefully see before May. He needs to get another testosterone blood level but is afraid that his insurance will not cover [facility]. Messaged  who states that it should be covered under new contract, advised patient to complete this ASAP. Also introduced concept of cognitive fusing and defusing.      Interval History / Patient Symptoms:     No data recorded      Interventions Provided: Psychoeducation, Problem Solving Treatment, Behavioral Activation, Acceptance & Commitment Therapy, Develop Coping Strategies, Review Progress/Goals Stress Management, and Mindfulness      Progress Made: Mixed    Response to Intervention: Does feel it is helpful to talk about what is happening to him, states he does not talk to anyone else. Increased conflict with son-in-law increasing overall anxiety.         Plan:     Care Plan    There is no care plan documentation to display.         There are no Patient Instructions on file for this visit.      Follow Up / Next Appointment: 2 weeks      "

## 2025-03-08 ENCOUNTER — DOCUMENTATION WITH CHARGES (OUTPATIENT)
Dept: PRIMARY CARE | Facility: CLINIC | Age: 60
End: 2025-03-08
Payer: MEDICARE

## 2025-03-08 DIAGNOSIS — F41.8 DEPRESSION WITH ANXIETY: Primary | ICD-10-CM

## 2025-03-12 ENCOUNTER — APPOINTMENT (OUTPATIENT)
Dept: PRIMARY CARE | Facility: CLINIC | Age: 60
End: 2025-03-12
Payer: MEDICARE

## 2025-03-16 NOTE — PROGRESS NOTES
Hepatology: Initial Office Note     Patient: Clifford Recinos, a 59 y.o. year old male presents for evaluation of cirrhosis.  PCP: HUMBERTO Limon-CNP    History of Present Illness   Clifford Recinos presents for evaluation of cirrhosis. He is accompanied by his spouse for today's visit.     He was noted to have changes of cirrhosis on imaging in 2024 and 2025 with CT. Prior imaging from 2012/2013 with fatty liver on imaging.   He has had symptoms of diarrhea- loose and runny stools. This occurs 1-2 times a day on some days, but can get up to 5 times a day. Denies having episodes of no Bms/constipation. Denies having abdominal pain, rectal bleeding with diarrhea. No abx in the last 6 months. He had diarrhea which would occur post prandially after gallbladder surgery- could not use cholestyramine due to interaction/absorption issues with some of his meds. His appetite is not as robust and he has lost up to 50lbs of weight in the last 1 year or so.      Denies symptoms of LE edema, abdominal distention, confusion, jaundice. He feels weak, fatigued and notes loss of muscle mass.     Review of Systems   Constitutional/ General: No fever, fatigue +  Eyes: no yellow discoloration  ENT: normal   Cardiovascular: no chest pain   Respiratory: no shortness of breath  Gastrointestinal: as per HPI  Integumentary: no rashes   Neurologic: no weakness or numbness of extremities  Psychiatric: no mood fluctuations  Musculoskeletal: no joint swelling   Genitourinary: no dysuria, no hematuria    All other systems have been reviewed and are negative except as noted in the HPI and above.    PMHx: HTN, anxiety, depression, back pain, pancreatitis in his younger years (27 yrs of age)- calcifications.   PSHx: cholecystectomy, appendectomy.    Social hx: rare alcohol use- in the past once in a few months (1-2 beers a summer), denies hx of smoking, denies hx of illicit substance use.   Family hx: denies history of hepatobiliary disease or  malignancy in the family.     Medications     Current Outpatient Medications   Medication Instructions    amLODIPine (NORVASC) 10 mg, oral, Daily    blood-glucose meter (OneTouch Ultra2 Meter) misc USE TO CHECK BLOOD SUGAR    clonazePAM (KLONOPIN) 0.25 mg, oral, 2 times daily PRN, Per patient report    escitalopram (LEXAPRO) 20 mg, Daily    escitalopram (LEXAPRO) 20 mg, oral, Daily    lancets (OneTouch UltraSoft Lancets) misc Check BG as needed for hypoglycemia    melatonin 5 mg, Nightly    melatonin 5 mg    melatonin 5 mg, oral, Nightly PRN    OneTouch Ultra Test strip CHECK BLOOD GLUCOSE AS NEEDED FOR SYMPTOMS OF LOW BLOOD SUGAR    OneTouch Ultra2 Meter misc USE TO CHECK BLOOD SUGAR    traZODone (Desyrel) 50 mg tablet 1-2 tablets at bedtime as needed for sleep      Physical Examination     Vitals:    03/18/25 1447   BP: 133/56   Pulse: 51   Temp: 36.4 °C (97.5 °F)     Vitals:    03/18/25 1447   Weight: 86.2 kg (190 lb)     Body mass index is 26.5 kg/m².    Constitutional: awake, alert   Eyes: EOMI, anicteric sclera   Respiratory: Bilateral air entry equal   Cardiovascular: regular rate and rhythm, no lower extremity edema  Abdomen: soft, non tender, non distended, no free fluid wave appreciated, bowel sounds present   Neurologic: gross motor strength intact, no asterixis  Psychiatric: alert, appropriate mood and affect, oriented to time/place/person    Labs     Lab Results   Component Value Date     01/03/2025    K 3.8 01/03/2025    CREATININE 0.62 01/03/2025    ALBUMIN 4.7 01/03/2025    ALT 11 01/03/2025    AST 12 01/03/2025    ALKPHOS 47 01/03/2025    INR 1.2 (H) 04/28/2024    HGB 10.8 (L) 01/03/2025     01/03/2025      MELD 3.0: 8 at 4/29/2024  6:20 AM  MELD-Na: 8 at 4/29/2024  6:20 AM  Calculated from:  Serum Creatinine: 0.6 mg/dL (Using min of 1 mg/dL) at 4/29/2024  6:20 AM  Serum Sodium: 141 mmol/L (Using max of 137 mmol/L) at 4/29/2024  6:20 AM  Total Bilirubin: 0.8 mg/dL (Using min of 1 mg/dL)  at 4/28/2024  1:27 PM  Serum Albumin: 4.4 g/dL (Using max of 3.5 g/dL) at 4/29/2024  6:20 AM  INR(ratio): 1.2 at 4/28/2024  1:27 PM  Age at listing (hypothetical): 58 years  Sex: Male at 4/29/2024  6:20 AM     Lab Results   Component Value Date    HEPBSAG NONREACTIVE 07/03/2023    HEPCAB NONREACTIVE 07/03/2023    KENIA POSITIVE (A) 08/10/2022    ANATITER 1:80 08/10/2022    FERRITIN 116 12/16/2024    IRONSAT 37 12/16/2024    IRON 129 12/16/2024    TIBC 346 12/16/2024     Imaging   CTAP Jan 2025: IMPRESSION: No acute abnormality within the abdomen or pelvis.    Cirrhotic liver morphology with splenomegaly and recanalized umbilical vein indicative of portal hypertension. No ascites.    Sept 2024 CTAP: LIVER: The liver demonstrates homogeneous attenuation without evidence of focal liver lesions. The liver margin is mildly lobulated. Correlate for cirrhosis.    [facility] CTAP 2013: IMPRESSION: 1. Diffuse fatty liver, not significantly changed. 2. Small stone in the gallbladder neck.  CTAP July 2012: CONCLUSION: QUESTION OF TINY STONES WITHIN THE GALLBLADDER. NO ADDITIONAL FINDINGS TO SUGGEST CHOLECYSTITIS. MILD FATTY INFILTRATION OF THE LIVER.     Echo April 2024: CONCLUSIONS:   1. Left ventricular systolic function is normal with a 60-65% estimated ejection fraction.   2. The left atrium is moderate to severely dilated.   3. There is mild mitral and tricuspid regurgitation.   4. There is no evidence of a patent foramen ovale.   5. The estimated pulmonary artery pressure is mildly elevated with the RVSP at 48.7 mmHg.    Colonoscopy June 2024: Impression  2 subcentimeter polyps were removed  (grade 1) hemorrhoid  The ileocecal valve and cecum appeared normal. Performed random biopsy using biopsy forceps.  EGD June 2024:   Impression  Ring in the GE junction  Mild erythematous mucosa in the antrum; performed cold forceps biopsy  Hiatal hernia  The duodenum appeared normal. Performed random biopsy to rule out celiac  disease.  Prominent veins in lower esophagus but not typical with esophageal varices      No bleeding/ulcer up to D2     FINAL DIAGNOSIS   A. DUODENUM SECOND PART BIOPSY: SMALL INTESTINAL MUCOSA WITH NO SIGNIFICANT PATHOLOGIC FINDINGS.     B. STOMACH ANTRUM BIOPSY: GASTRIC MUCOSA WITH NO SIGNIFICANT PATHOLOGIC FINDINGS, SEE NOTE.  Note: No microorganisms are identified.     C. COLON - DESCENDING POLYP: MULTIPLE FRAGMENTS OF TUBULAR ADENOMA.     D. COLON  - RANDOM BIOPSY: COLONIC MUCOSA WITH NO SIGNIFICANT PATHOLOGIC FINDINGS.     E. COLON - TRANSVERSE POLYP: TUBULAR ADENOMA.         Assessment and Plan    Clifford Recinos, a 59 y.o. year old male presents for evaluation of cirrhosis. I have reviewed pertinent provider notes, labs and imaging studies. Discussed results and their interpretation with the patient today.    Encounter Diagnoses   Name Primary?    Cirrhosis of liver without ascites, unspecified hepatic cirrhosis type (Multi) Yes    Chronic diarrhea     Diarrhea, unspecified      Orders Placed This Encounter   Procedures    Stool Pathogen Panel, PCR    Alpha-1 Antitrypsin Phenotype    KENIA with Reflex to KUSHAL    Anti-Mitochondrial Antibody    Anti-Smooth Muscle Antibody    Ceruloplasmin    Ferritin    Hepatic Function Panel    Hepatitis A Antibody, Total    Hepatitis B Core Antibody, Total    Hepatitis B Surface Antibody    Immunoglobulins (IgG, IgA, IgM)    Iron and TIBC    Tissue Transglutaminase IgA    [facility] MISCELLANEOUS TEST (REFRIGERATED)    Calprotectin, Fecal    Ova/Para + Giardia/Cryptosporidium Antigen    Pancreatic Elastase, Fecal    Sedimentation Rate    C-Reactive Protein    Alpha-Fetoprotein    Liver Elastography (Fibroscan)     # Imaging in the last year has raise concern for cirrhosis, given nodular contour of the liver.  Risk factors for development of advanced chronic liver disease include steatotic liver disease likely secondary to metabolic dysfunction given prior history of elevated BMI,  hypertension.  Based on patient's clinical history, he reports having unintentional weight loss over the past year.  This raises concern for development of sarcopenia.  Interestingly his liver enzymes, bilirubin are within normal range, as are platelet count and INR as well.    -Would be imperative to have an assessment done to establish that there is evidence of cirrhosis/advanced chronic liver disease.  Would recommend noninvasive testing with a FibroScan and enhanced liver fibrosis score with blood work.  Additionally recommend expanding the workup for etiology of chronic liver disease, to evaluate for genetic and metabolic disorders of the liver, autoimmune disease of the liver.  Lab work has been ordered for this assessment.    -At this time patient does not appear to have overt complications from cirrhosis, no evidence of jaundice, hepatic encephalopathy, ascites.  However concerning that he has weight loss/possible muscle loss.  But this is tied into his longstanding history of diarrhea also needs to be investigated.    -I discussed with patient the pathophysiology of chronic liver disease/cirrhosis from any etiology including steatotic liver disease from metabolic dysfunction.  Discussed the risk of development of potential complications in the setting of cirrhosis with clinically significant portal hypertension.  This includes development of ascites, encephalopathy, jaundice, renal dysfunction, risk of HCC and sarcopenia.  -Discussed the importance of avoidance of all hepatotoxic agents including alcohol.  His alcohol use is reported as rare, discussed with patient avoiding the occasional/rare use as well.  In addition recommend avoidance of any herbal supplements, NSAIDs.    -Prior EGD with findings of prominent veins in the lower esophagus but endoscopist noted these were not typical of esophageal varices.  Pending above assessment with noninvasive testing, anticipate repeat endoscopy at some point for  evaluation again.    # Recommend further evaluation of symptoms of chronic diarrhea.  This includes evaluation of inflammatory markers-ESR, CRP.  Recommend stool studies to rule out infection such as Giardia/Cryptosporidium/ova parasites, stool culture.  Recommend check of pancreatic elastase, fecal calprotectin.  He has had metabolic parameters checked in the past year suggest TSH.  Recommend check of TTG IgA and IgA levels.  He has had a colonoscopy in the past year without evidence of colitis on endoscopic evaluation and histopathology.  This excludes the diagnosis of IBD/colonic involvement, microscopic colitis.     -Counseled patient on nutritional recommendations given history of weight loss, concern for chronic liver disease.  Recommend intake of 1.5 g/kg body weight protein intake of lean protein.  In addition recommend adequate intake of vegetables and intake of complex carbohydrates/fats in moderation.  Discussed with patient to opt for smaller portions if he is unable to take in a regular meal.  Avoidance of dairy products given diarrhea.     Follow up in 4-5 months.

## 2025-03-18 ENCOUNTER — OFFICE VISIT (OUTPATIENT)
Dept: GASTROENTEROLOGY | Facility: CLINIC | Age: 60
End: 2025-03-18
Payer: MEDICARE

## 2025-03-18 VITALS
HEIGHT: 71 IN | DIASTOLIC BLOOD PRESSURE: 56 MMHG | WEIGHT: 190 LBS | SYSTOLIC BLOOD PRESSURE: 133 MMHG | HEART RATE: 51 BPM | BODY MASS INDEX: 26.6 KG/M2 | TEMPERATURE: 97.5 F

## 2025-03-18 DIAGNOSIS — K74.60 CIRRHOSIS OF LIVER WITHOUT ASCITES, UNSPECIFIED HEPATIC CIRRHOSIS TYPE (MULTI): Primary | ICD-10-CM

## 2025-03-18 DIAGNOSIS — R19.7 DIARRHEA, UNSPECIFIED: ICD-10-CM

## 2025-03-18 DIAGNOSIS — K52.9 CHRONIC DIARRHEA: ICD-10-CM

## 2025-03-18 PROCEDURE — 99215 OFFICE O/P EST HI 40 MIN: CPT | Performed by: STUDENT IN AN ORGANIZED HEALTH CARE EDUCATION/TRAINING PROGRAM

## 2025-03-18 PROCEDURE — 3075F SYST BP GE 130 - 139MM HG: CPT | Performed by: STUDENT IN AN ORGANIZED HEALTH CARE EDUCATION/TRAINING PROGRAM

## 2025-03-18 PROCEDURE — G2211 COMPLEX E/M VISIT ADD ON: HCPCS | Performed by: STUDENT IN AN ORGANIZED HEALTH CARE EDUCATION/TRAINING PROGRAM

## 2025-03-18 PROCEDURE — 3078F DIAST BP <80 MM HG: CPT | Performed by: STUDENT IN AN ORGANIZED HEALTH CARE EDUCATION/TRAINING PROGRAM

## 2025-03-18 PROCEDURE — 99205 OFFICE O/P NEW HI 60 MIN: CPT | Performed by: STUDENT IN AN ORGANIZED HEALTH CARE EDUCATION/TRAINING PROGRAM

## 2025-03-18 PROCEDURE — 3008F BODY MASS INDEX DOCD: CPT | Performed by: STUDENT IN AN ORGANIZED HEALTH CARE EDUCATION/TRAINING PROGRAM

## 2025-03-18 ASSESSMENT — PAIN SCALES - GENERAL: PAINLEVEL_OUTOF10: 3

## 2025-03-18 NOTE — PATIENT INSTRUCTIONS
Clifford Recinos,     Thank you for coming in for your hepatology office visit today.   As per our discussion, I recommend:     Have blood work done.   Have stool studies done.   Have a fibroscan scheduled, this can be done at check out or by calling [phone].   Implement high protein intake. Aim for 1.5gm/kg of body weight.   Avoid any herbal supplements, NSAIDs, alcohol use.     I would like to see you back in the office in 4-5 months ([facility] or [facility]).  If you are not provided with a date for your follow up visit at the end of your encounter today at the check out desk, I would encourage you to call [phone] to schedule your appointment with me.   If you have any trouble or need assistance with having this done, please reach out to my office staff at [phone] or my nurse coordinator at [phone] (Ms Anna SINGLETON).     I look forward to seeing you again. Thank you for allowing me to participate in your care.     Sincerely,  Katelynn Hoffman MD  Hepatology

## 2025-03-21 LAB
TESTOST FREE SERPL-MCNC: 2.8 PG/ML (ref 35–155)
TESTOST SERPL-MCNC: 27 NG/DL (ref 250–1100)

## 2025-03-26 ENCOUNTER — APPOINTMENT (OUTPATIENT)
Dept: PRIMARY CARE | Facility: CLINIC | Age: 60
End: 2025-03-26
Payer: MEDICARE

## 2025-03-28 LAB
C COLI+JEJUNI+LARI FUSA STL QL NAA+PROBE: NOT DETECTED
CRYPTOSP AG STL QL IA: NORMAL
EC STX1 GENE STL QL NAA+PROBE: NOT DETECTED
EC STX2 GENE STL QL NAA+PROBE: NOT DETECTED
G LAMBLIA AG STL QL IA: NORMAL
NOROV GI+II ORF1-ORF2 JNC STL QL NAA+PR: NOT DETECTED
O+P STL CONC: NORMAL
O+P STL TRI STN: NORMAL
RVA NSP5 STL QL NAA+PROBE: NOT DETECTED
SALMONELLA SP RPOD STL QL NAA+PROBE: NOT DETECTED
SHIGELLA DNA SPEC QL NAA+PROBE: NOT DETECTED
V CHOL+PARA RFBL+TRKH+TNAA STL QL NAA+PR: NOT DETECTED
Y ENTERO RECN STL QL NAA+PROBE: NOT DETECTED

## 2025-03-30 LAB
A1AT PHENOTYP SERPL-IMP: NORMAL
AFP-TM SERPL-MCNC: 3.6 NG/ML
ALBUMIN SERPL-MCNC: 5.2 G/DL (ref 3.6–5.1)
ALBUMIN/GLOB SERPL: 2.5 (CALC) (ref 1–2.5)
ALP SERPL-CCNC: 40 U/L (ref 35–144)
ALT SERPL-CCNC: 10 U/L (ref 9–46)
ANA SER QL IF: NEGATIVE
AST SERPL-CCNC: 13 U/L (ref 10–35)
BILIRUB DIRECT SERPL-MCNC: 0.2 MG/DL
BILIRUB INDIRECT SERPL-MCNC: 0.7 MG/DL (CALC) (ref 0.2–1.2)
BILIRUB SERPL-MCNC: 0.9 MG/DL (ref 0.2–1.2)
CERULOPLASMIN SERPL-MCNC: 20 MG/DL (ref 14–30)
CRP SERPL-MCNC: <3 MG/L
ERYTHROCYTE [SEDIMENTATION RATE] IN BLOOD BY WESTERGREN METHOD: 6 MM/H
FERRITIN SERPL-MCNC: 85 NG/ML (ref 38–380)
GLOBULIN SER CALC-MCNC: 2.1 G/DL (CALC) (ref 1.9–3.7)
HAV AB SER QL IA: NORMAL
HBV CORE AB SERPL QL IA: NORMAL
HBV SURFACE AB SERPL IA-ACNC: <5 MIU/ML
IGA SERPL-MCNC: 239 MG/DL (ref 47–310)
IGG SERPL-MCNC: 1074 MG/DL (ref 600–1640)
IGM SERPL-MCNC: 119 MG/DL (ref 50–300)
IRON SATN MFR SERPL: 31 % (CALC) (ref 20–48)
IRON SERPL-MCNC: 107 MCG/DL (ref 50–180)
MITOCHONDRIA AB SER QL IF: NEGATIVE
PROT SERPL-MCNC: 7.3 G/DL (ref 6.1–8.1)
QUEST ENHANCED LIVER FIBROSIS (ELF) SCORE: 9.15
SMOOTH MUSCLE AB SER QL IF: NEGATIVE
TIBC SERPL-MCNC: 340 MCG/DL (CALC) (ref 250–425)
TTG IGA SER-ACNC: <1 U/ML

## 2025-03-30 NOTE — PROGRESS NOTES
Collaborative Care (CoCM)  Progress Note    Type of Interaction: In Office    Start Time: 210    End Time: 300        Appointment: Scheduled    Reason for Visit:   Chief Complaint   Patient presents with    Depression     Depression with anxiety    Clifford continues to struggle with symptoms likely related to very low testosterone. Tried to get him sooner appointment with endocrinology, which was not possible. Will reach out to his PCP to see if she could initiate this. He continues to feel hopeless about anyone helping him, encouraged him to try t o change his self-talk around this. Going through additional stress due to brother selling mother's house, which he would have liked to buy. He is still making himself walk the dog on a daily basis but this is getting harder. Encouraged as much activity as tolerated. Processed some significant traumatic health scares from his past.      Interval History / Patient Symptoms:     No data recorded      Interventions Provided: Psychoeducation, Behavioral Activation, Interpersonal Therapy, Solution Focused Therapy, Strengths Exploration, Develop Coping Strategies, and Review Progress/Goals Stress Management      Progress Made: Minimum    Response to Intervention: Engaged in sessions, able to laugh at times, does enjoy process of being able to express his feelings in session        Plan:     Care Plan    There is no care plan documentation to display.         There are no Patient Instructions on file for this visit.      Follow Up / Next Appointment:  2 weeks

## 2025-03-31 ENCOUNTER — CLINICAL SUPPORT (OUTPATIENT)
Dept: GASTROENTEROLOGY | Facility: CLINIC | Age: 60
End: 2025-03-31
Payer: MEDICARE

## 2025-03-31 DIAGNOSIS — K74.60 CIRRHOSIS OF LIVER WITHOUT ASCITES, UNSPECIFIED HEPATIC CIRRHOSIS TYPE (MULTI): ICD-10-CM

## 2025-03-31 PROCEDURE — 91200 LIVER ELASTOGRAPHY: CPT | Performed by: STUDENT IN AN ORGANIZED HEALTH CARE EDUCATION/TRAINING PROGRAM

## 2025-04-01 ENCOUNTER — APPOINTMENT (OUTPATIENT)
Dept: PRIMARY CARE | Facility: CLINIC | Age: 60
End: 2025-04-01
Payer: MEDICARE

## 2025-04-01 ENCOUNTER — DOCUMENTATION WITH CHARGES (OUTPATIENT)
Dept: PRIMARY CARE | Facility: CLINIC | Age: 60
End: 2025-04-01
Payer: MEDICARE

## 2025-04-01 VITALS
SYSTOLIC BLOOD PRESSURE: 118 MMHG | DIASTOLIC BLOOD PRESSURE: 70 MMHG | HEART RATE: 55 BPM | HEIGHT: 71 IN | BODY MASS INDEX: 26.6 KG/M2 | WEIGHT: 190 LBS | OXYGEN SATURATION: 98 %

## 2025-04-01 DIAGNOSIS — Z13.29 SCREENING FOR THYROID DISORDER: ICD-10-CM

## 2025-04-01 DIAGNOSIS — F41.9 ANXIETY AND DEPRESSION: ICD-10-CM

## 2025-04-01 DIAGNOSIS — Z12.5 SCREENING FOR PROSTATE CANCER: ICD-10-CM

## 2025-04-01 DIAGNOSIS — E29.1 HYPOGONADISM IN MALE: Primary | ICD-10-CM

## 2025-04-01 DIAGNOSIS — F32.A ANXIETY AND DEPRESSION: ICD-10-CM

## 2025-04-01 DIAGNOSIS — Z00.00 ROUTINE ADULT HEALTH MAINTENANCE: ICD-10-CM

## 2025-04-01 DIAGNOSIS — F41.8 DEPRESSION WITH ANXIETY: Primary | ICD-10-CM

## 2025-04-01 DIAGNOSIS — K74.60 CIRRHOSIS OF LIVER WITHOUT ASCITES, UNSPECIFIED HEPATIC CIRRHOSIS TYPE (MULTI): ICD-10-CM

## 2025-04-01 DIAGNOSIS — I49.3 MULTIPLE PREMATURE VENTRICULAR COMPLEXES: ICD-10-CM

## 2025-04-01 DIAGNOSIS — D64.9 ANEMIA, UNSPECIFIED TYPE: ICD-10-CM

## 2025-04-01 DIAGNOSIS — I10 ESSENTIAL HYPERTENSION: ICD-10-CM

## 2025-04-01 DIAGNOSIS — Z79.899 ON LONG TERM DRUG THERAPY: ICD-10-CM

## 2025-04-01 DIAGNOSIS — E55.9 VITAMIN D DEFICIENCY: ICD-10-CM

## 2025-04-01 DIAGNOSIS — Z13.21 ENCOUNTER FOR VITAMIN DEFICIENCY SCREENING: ICD-10-CM

## 2025-04-01 LAB
BASOPHILS # BLD AUTO: 28 CELLS/UL (ref 0–200)
BASOPHILS NFR BLD AUTO: 0.6 %
EOSINOPHIL # BLD AUTO: 28 CELLS/UL (ref 15–500)
EOSINOPHIL NFR BLD AUTO: 0.6 %
ERYTHROCYTE [DISTWIDTH] IN BLOOD BY AUTOMATED COUNT: 16.8 % (ref 11–15)
HCT VFR BLD AUTO: 33.3 % (ref 38.5–50)
HGB BLD-MCNC: 11.2 G/DL (ref 13.2–17.1)
LYMPHOCYTES # BLD AUTO: 743 CELLS/UL (ref 850–3900)
LYMPHOCYTES NFR BLD AUTO: 15.8 %
MCH RBC QN AUTO: 34.5 PG (ref 27–33)
MCHC RBC AUTO-ENTMCNC: 33.6 G/DL (ref 32–36)
MCV RBC AUTO: 102.5 FL (ref 80–100)
MONOCYTES # BLD AUTO: 259 CELLS/UL (ref 200–950)
MONOCYTES NFR BLD AUTO: 5.5 %
NEUTROPHILS # BLD AUTO: 3643 CELLS/UL (ref 1500–7800)
NEUTROPHILS NFR BLD AUTO: 77.5 %
PLATELET # BLD AUTO: 353 THOUSAND/UL (ref 140–400)
PMV BLD REES-ECKER: 12.4 FL (ref 7.5–12.5)
RBC # BLD AUTO: 3.25 MILLION/UL (ref 4.2–5.8)
WBC # BLD AUTO: 4.7 THOUSAND/UL (ref 3.8–10.8)

## 2025-04-01 RX ORDER — TESTOSTERONE 30 MG/1.5ML
2 SOLUTION TOPICAL EVERY MORNING
Qty: 1 EACH | Refills: 2 | Status: SHIPPED | OUTPATIENT
Start: 2025-04-01 | End: 2025-06-30

## 2025-04-01 ASSESSMENT — ENCOUNTER SYMPTOMS
CHILLS: 0
WHEEZING: 0
BRUISES/BLEEDS EASILY: 0
ADENOPATHY: 0
SHORTNESS OF BREATH: 0
TROUBLE SWALLOWING: 0
DIARRHEA: 0
TREMORS: 1
ABDOMINAL PAIN: 0
BACK PAIN: 1
EYE PAIN: 0
CONSTIPATION: 0
COLOR CHANGE: 0
SEIZURES: 0
FATIGUE: 0
FEVER: 0
NAUSEA: 0
WOUND: 0
PALPITATIONS: 1
DIZZINESS: 0
JOINT SWELLING: 0
DIFFICULTY URINATING: 0
MYALGIAS: 0
HEADACHES: 0
WEAKNESS: 0
ABDOMINAL DISTENTION: 0
COUGH: 0

## 2025-04-01 NOTE — ASSESSMENT & PLAN NOTE
Will initiate low-dose testosterone replacement therapy.  Consent obtained and discussed risk/benefits of hormone replacement therapy.  Follow-up appointment with endocrinology in place to continue management of low testosterone levels.  Patient to have blood work rechecked in 3 months for surveillance purposes  I have personally reviewed the OARRS report for the patient. This report is scanned into the electronic medical record. I have considered the risks of abuse, dependence, addiction and diversion. I believe that it is clinically appropriate for the patient to be prescribed this medication

## 2025-04-01 NOTE — PATIENT INSTRUCTIONS
Please arrange for routine follow up in 3 months. You may schedule on-line through [employer] or call our office at [phone].  Please have blood work completed prior to this visit.

## 2025-04-01 NOTE — ASSESSMENT & PLAN NOTE
Will continue to monitor liver function routinely.  Patient to maintain routine follow-up with GI for continued evaluation and treatment recommendations

## 2025-04-01 NOTE — ASSESSMENT & PLAN NOTE
Routine blood work ordered today for assessment purposes.  Will continue to monitor as appropriate  -Recent colonoscopy completed in 2024 with recommendations for repeat screening in 5 years (2029)

## 2025-04-01 NOTE — ASSESSMENT & PLAN NOTE
Patient to be new routine follow-up with hematology for continued valuation and treatment recommendation

## 2025-04-01 NOTE — PROGRESS NOTES
Subjective   Patient ID: Clifford Recinos is a 59 y.o. male who presents for Follow-up (Testosterone ).    Patient seen today to discuss testosterone replacement therapy.  Present for today's visit is patient's spouse. Patient is currently working with several specialist due to several chronic health concerns.  Patient has had very low testosterone levels for some time.  He has been trying to follow-up with endocrinology but still is several months away from being seen.  He is requesting testosterone be initiated until he could be seen by specialist. Risks and benefits discussed. Patient to start medication as directed; written consent obtained today.  Patient continues to follow with counseling services and psychiatry for his mental health management.  He is now on low-dose Klonopin for persistent anxiety.  Depression and anxiety appear to be relatively stable at this time.  Also discussed with patient recent GI follow-up for fatty liver disease.  No reported issues with appetite, staying hydrated, bowel or bladder.  Spouse states that patient is doing his best to follow a healthy diet.  Good support system reported.  Medications reviewed.  No other acute concerns voiced at this time.       Current Outpatient Medications on File Prior to Visit   Medication Sig Dispense Refill    amLODIPine (Norvasc) 10 mg tablet Take 1 tablet (10 mg) by mouth once daily. (Patient taking differently: Take 0.5 tablets (5 mg) by mouth once daily.) 90 tablet 3    blood-glucose meter (OneTouch Ultra2 Meter) misc USE TO CHECK BLOOD SUGAR 1 each 0    clonazePAM (KlonoPIN) 0.5 mg tablet Take 0.5 tablets (0.25 mg) by mouth 2 times a day as needed for anxiety. Per patient report 60 tablet 2    escitalopram (Lexapro) 20 mg tablet Take 1 tablet (20 mg) by mouth once daily. 90 tablet 1    lancets (OneTouch UltraSoft Lancets) misc Check BG as needed for hypoglycemia 100 each 2    melatonin 5 mg tablet,disintegrating Dissolve 5 mg in the mouth as  needed at bedtime (sleep). 90 tablet 0    melatonin 5 mg tablet Take 1 tablet (5 mg) by mouth once daily at bedtime.      OneTouch Ultra Test strip CHECK BLOOD GLUCOSE AS NEEDED FOR SYMPTOMS OF LOW BLOOD SUGAR 100 strip 1    OneTouch Ultra2 Meter misc USE TO CHECK BLOOD SUGAR      traZODone (Desyrel) 50 mg tablet 1-2 tablets at bedtime as needed for sleep 180 tablet 1    [DISCONTINUED] escitalopram (Lexapro) 20 mg tablet Take 1 tablet (20 mg) by mouth once daily.      [DISCONTINUED] melatonin 5 mg capsule Take 1 capsule (5 mg) by mouth.       No current facility-administered medications on file prior to visit.       Past Medical History:   Diagnosis Date    Anemia     Anxiety     Arthritis 2010    BPH (benign prostatic hyperplasia)     Depression     Fracture of fourth toe, left, closed, initial encounter 10/18/2019    Fracture of fourth toe, left, open, with routine healing, subsequent encounter 10/14/2019    Headache 06.21.24    History of blood transfusion     GI bleed   no reaction    Hypertension     Hypogonadism male     Other instability, left knee 05/02/2018    Other instability, left knee    Pain in left knee 05/02/2018    Left knee pain, unspecified chronicity    Pain in thoracic spine 02/21/2022    Thoracic spine pain    Palpitations     PVCs (premature ventricular contractions)     Restless leg syndrome 2010    Scoliosis 2023    Sleep apnea     having home sleep study soon    Unspecified injury of thorax, initial encounter 02/21/2022    Rib injury    Vasovagal syncope     Visual impairment 2022    Wears glasses     Wedge compression fracture of unspecified thoracic vertebra, initial encounter for closed fracture (Multi) 02/23/2022    Thoracic compression fracture        Past Surgical History:   Procedure Laterality Date    APPENDECTOMY      CHOLECYSTECTOMY      COLONOSCOPY          Family History   Problem Relation Name Age of Onset    Other (CABG) Mother Natalia Recinos (bypass)     Heart disease Mother  "Nataliagil Recinos (bypass)     Other (cardiac pacemaker) Father Abisai Recinos     Other (malignant neoplasm of prostate) Father Abisai Recinos     Other (sepsis) Father Abisai Recinos     Other (stroke syndrome) Father Abisai Recinos     Depression Father Abisai Recinos     Mental illness Father Abisai Recinos     Other (PTCA) Sister Suzan Wang     Depression Sister Suzan Wang     Heart disease Sister Suzan Wang     Depression Brother Mila     Hypertension Brother Carlitos and Star         Review of Systems   Constitutional:  Negative for chills, fatigue and fever.   HENT:  Negative for dental problem and trouble swallowing.    Eyes:  Negative for pain and visual disturbance.        Wears  glasses   Respiratory:  Negative for cough, shortness of breath and wheezing.    Cardiovascular:  Positive for palpitations. Negative for chest pain and leg swelling.        Positive for occasional \"flutter \"   Gastrointestinal:  Negative for abdominal distention, abdominal pain, constipation, diarrhea and nausea.        Positive for IBS   Endocrine: Negative for cold intolerance and heat intolerance.   Genitourinary:  Negative for difficulty urinating.   Musculoskeletal:  Positive for back pain. Negative for gait problem, joint swelling and myalgias.   Skin:  Negative for color change, pallor, rash and wound.   Allergic/Immunologic: Negative for environmental allergies and food allergies.   Neurological:  Positive for tremors. Negative for dizziness, seizures, weakness and headaches.   Hematological:  Negative for adenopathy. Does not bruise/bleed easily.   Psychiatric/Behavioral:  Negative for behavioral problems.         Positve for anxiety and depression   All other systems reviewed and are negative.      Objective   /70   Pulse 55   Ht 1.803 m (5' 11\")   Wt 86.2 kg (190 lb)   SpO2 98%   BMI 26.50 kg/m²     Physical Exam  Constitutional:       General: He is not in acute distress.     Appearance: Normal appearance. He is not " toxic-appearing.   HENT:      Head: Normocephalic and atraumatic.      Right Ear: External ear normal.      Left Ear: External ear normal.      Nose: Nose normal.      Mouth/Throat:      Mouth: Mucous membranes are moist.      Pharynx: Oropharynx is clear.   Eyes:      Extraocular Movements: Extraocular movements intact.      Conjunctiva/sclera: Conjunctivae normal.   Cardiovascular:      Rate and Rhythm: Normal rate and regular rhythm.      Pulses: Normal pulses.      Heart sounds: Normal heart sounds. No murmur heard.  Pulmonary:      Effort: Pulmonary effort is normal.      Breath sounds: Normal breath sounds. No wheezing.   Abdominal:      General: Bowel sounds are normal.      Palpations: Abdomen is soft.   Musculoskeletal:         General: No swelling.      Cervical back: Normal range of motion and neck supple.   Skin:     General: Skin is warm and dry.   Neurological:      Mental Status: He is alert and oriented to person, place, and time. Mental status is at baseline.      Cranial Nerves: No cranial nerve deficit.      Motor: No weakness.      Comments: Positive for tremor   Psychiatric:         Mood and Affect: Mood normal.         Behavior: Behavior normal.         Thought Content: Thought content normal.         Judgment: Judgment normal.         Assessment/Plan   Problem List Items Addressed This Visit             ICD-10-CM    Anemia D64.9     Patient to be new routine follow-up with hematology for continued valuation and treatment recommendation         Anxiety and depression F41.9, F32.A     Patient to maintain routine follow-up with counseling and psychiatric services for continued treatment and medication recommendations.  Good support system reported with spouse         Essential hypertension I10     Patient encouraged to continue current medication regiment.  Will continue to monitor vital signs at subsequent visits.  Provider to be notified for any new cardiac concerns.         Relevant Orders     CBC and Auto Differential    Basic Metabolic Panel    Lipid Panel Non-Fasting    Multiple premature ventricular complexes I49.3     Patient has followed in the past with cardiology for evaluation and treatment recommendations.  He appears to not tolerate beta-blockers well.  He is currently off of any antiarrhythmic/beta-blockers despite cardiology's most recent recommendations for propranolol.  He is to maintain routine follow-up with cardiology for continued recommendations regarding symptomatic PVCs         Cirrhosis of liver without ascites (Multi) K74.60     Will continue to monitor liver function routinely.  Patient to maintain routine follow-up with GI for continued evaluation and treatment recommendations         Hypogonadism in male - Primary E29.1     Will initiate low-dose testosterone replacement therapy.  Consent obtained and discussed risk/benefits of hormone replacement therapy.  Follow-up appointment with endocrinology in place to continue management of low testosterone levels.  Patient to have blood work rechecked in 3 months for surveillance purposes  I have personally reviewed the [facility] report for the patient. This report is scanned into the electronic medical record. I have considered the risks of abuse, dependence, addiction and diversion. I believe that it is clinically appropriate for the patient to be prescribed this medication          Relevant Medications    testosterone (Axiron) 30 mg/actuation (1.5 mL) topical solution    Other Relevant Orders    Prostate Specific Antigen    CBC and Auto Differential    Testosterone, total and free    Routine adult health maintenance Z00.00     Routine blood work ordered today for assessment purposes.  Will continue to monitor as appropriate  -Recent colonoscopy completed in 2024 with recommendations for repeat screening in 5 years (2029)         Relevant Orders    CBC and Auto Differential    TSH with reflex to Free T4 if abnormal    T3, free    Vitamin  D 25-Hydroxy,Total (for eval of Vitamin D levels)    Basic Metabolic Panel    Lipid Panel Non-Fasting     Other Visit Diagnoses         Codes    Screening for prostate cancer     Z12.5    Relevant Orders    Prostate Specific Antigen    Screening for thyroid disorder     Z13.29    Relevant Orders    TSH with reflex to Free T4 if abnormal    Encounter for vitamin deficiency screening     Z13.21    Relevant Orders    Vitamin D 25-Hydroxy,Total (for eval of Vitamin D levels)    On long term drug therapy     Z79.899    Relevant Orders    Vitamin D 25-Hydroxy,Total (for eval of Vitamin D levels)    Vitamin D deficiency     E55.9    Relevant Orders    Vitamin D 25-Hydroxy,Total (for eval of Vitamin D levels)

## 2025-04-01 NOTE — ASSESSMENT & PLAN NOTE
Noted. Patient to maintain routine follow-up with counseling and psychiatric services for continued treatment and medication recommendations.  Good support system reported with spouse

## 2025-04-02 LAB
25(OH)D3+25(OH)D2 SERPL-MCNC: 42 NG/ML (ref 30–100)
ANION GAP SERPL CALCULATED.4IONS-SCNC: 8 MMOL/L (CALC) (ref 7–17)
BUN SERPL-MCNC: 16 MG/DL (ref 7–25)
BUN/CREAT SERPL: 23 (CALC) (ref 6–22)
CALCIUM SERPL-MCNC: 9.7 MG/DL (ref 8.6–10.3)
CALPROTECTIN STL-MCNT: 20 MCG/G
CHLORIDE SERPL-SCNC: 104 MMOL/L (ref 98–110)
CHOLEST SERPL-MCNC: 117 MG/DL
CHOLEST/HDLC SERPL: 2.2 (CALC)
CO2 SERPL-SCNC: 27 MMOL/L (ref 20–32)
CREAT SERPL-MCNC: 0.69 MG/DL (ref 0.7–1.3)
EGFRCR SERPLBLD CKD-EPI 2021: 107 ML/MIN/1.73M2
ELASTASE PANC STL-MCNT: >800 MCG/G
GLUCOSE SERPL-MCNC: 94 MG/DL (ref 65–99)
HDLC SERPL-MCNC: 54 MG/DL
LDLC SERPL CALC-MCNC: 51 MG/DL (CALC)
NONHDLC SERPL-MCNC: 63 MG/DL (CALC)
POTASSIUM SERPL-SCNC: 4.6 MMOL/L (ref 3.5–5.3)
SODIUM SERPL-SCNC: 139 MMOL/L (ref 135–146)
T3FREE SERPL-MCNC: 3 PG/ML (ref 2.3–4.2)
TRIGL SERPL-MCNC: 50 MG/DL
TSH SERPL-ACNC: 1.28 MIU/L (ref 0.4–4.5)

## 2025-04-05 LAB
C COLI+JEJUNI+LARI FUSA STL QL NAA+PROBE: NOT DETECTED
CRYPTOSP AG STL QL IA: NORMAL
EC STX1 GENE STL QL NAA+PROBE: NOT DETECTED
EC STX2 GENE STL QL NAA+PROBE: NOT DETECTED
G LAMBLIA AG STL QL IA: NORMAL
NOROV GI+II ORF1-ORF2 JNC STL QL NAA+PR: NOT DETECTED
O+P STL TRI STN: NORMAL
RVA NSP5 STL QL NAA+PROBE: NOT DETECTED
SALMONELLA SP RPOD STL QL NAA+PROBE: NOT DETECTED
SHIGELLA DNA SPEC QL NAA+PROBE: NOT DETECTED
V CHOL+PARA RFBL+TRKH+TNAA STL QL NAA+PR: NOT DETECTED
Y ENTERO RECN STL QL NAA+PROBE: NOT DETECTED

## 2025-04-09 ENCOUNTER — OFFICE VISIT (OUTPATIENT)
Dept: BEHAVIORAL HEALTH | Facility: HOSPITAL | Age: 60
End: 2025-04-09
Payer: MEDICARE

## 2025-04-09 ENCOUNTER — APPOINTMENT (OUTPATIENT)
Dept: PRIMARY CARE | Facility: CLINIC | Age: 60
End: 2025-04-09
Payer: MEDICARE

## 2025-04-09 DIAGNOSIS — F44.9 CONVERSION DISORDER: ICD-10-CM

## 2025-04-09 DIAGNOSIS — F41.1 GAD (GENERALIZED ANXIETY DISORDER): ICD-10-CM

## 2025-04-09 DIAGNOSIS — Z86.59 HISTORY OF POSTTRAUMATIC STRESS DISORDER (PTSD): ICD-10-CM

## 2025-04-09 DIAGNOSIS — F33.1 MODERATE EPISODE OF RECURRENT MAJOR DEPRESSIVE DISORDER: Primary | ICD-10-CM

## 2025-04-09 DIAGNOSIS — R63.4 UNINTENTIONAL WEIGHT LOSS: ICD-10-CM

## 2025-04-09 PROCEDURE — 90792 PSYCH DIAG EVAL W/MED SRVCS: CPT | Performed by: PSYCHIATRY & NEUROLOGY

## 2025-04-09 PROCEDURE — 90792 PSYCH DIAG EVAL W/MED SRVCS: CPT | Mod: AM | Performed by: PSYCHIATRY & NEUROLOGY

## 2025-04-09 NOTE — ASSESSMENT & PLAN NOTE
Recommend continuing lexapro 20 mg am  Recommend continuing trazodone 100 mg at bedtime  Has been on klonopin per wife on and off since 2008. I checked [facility], filled last 0.5mg 3/13/25 for #30, per wife and patient he only uses at most 1/2 tab (0.25mg total as needed ) up to 10 days a month    I discussed with the patient 2 augmentation agents, which need to be reviewed and discussed with their gastroenterologist and primary care as patient has high risk history of progressive liver disease, unintentional weight loss, anemia other, muscle, joint pain other, history of seizures vs conversion disorder.    I will send a message to his provider Kelly Lang to review the consult recommendations, after patient is able to meet with his providers and discuss options, patient was asked to make a follow  up appointment to start or have these medications continued and monitored if cleared, medically for these agents. The medications recommended are either or not both:    Zyprexa 2.5 mg -5 mg at bedtime *with dose reduction of trazodone, if needed  Or  Remeron 7.5 mg-15 mg at bedtime for sleep, appetite, weight gain, anxiety    Patient asked to make follow up appointment after meeting with his medical providers
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
## 2025-04-11 NOTE — PROGRESS NOTES
Collaborative Care (CoCM)  Progress Note    Type of Interaction: In Office    Start Time: 230    End Time: 300        Appointment: Scheduled    Reason for Visit:   Chief Complaint   Patient presents with    Depression     Depression with anxiety    Clifford only present for half his appointment as earlier he was having initial session with Dr. Anne in psychiatry. She conducted a very comprehensive exam and is recommending a trial of zyprexa or remeron, depending on consult with his other providers. He feels good about this, but overall feels very poor as just after he started testosterone replacement therapy he came down with norovirus. He does have optimism that things may improve now. Processed hurt around the way his brothers are treating him with respect to selling their mothers home.       Interval History / Patient Symptoms:     No data recorded      Interventions Provided: [place] Setting, Psychoeducation, Acceptance & Commitment Therapy, Strengths Exploration, Values Exploration, and Develop Coping Strategies      Progress Made: Mixed    Response to Intervention: Tearful at times regarding family, but he is able to try to focus on not personalizing and focusing on all the positive relationships he has in his life.        Plan:     Care Plan    There is no care plan documentation to display.         There are no Patient Instructions on file for this visit.      Follow Up / Next Appointment:  2 weeks

## 2025-04-18 DIAGNOSIS — G47.00 INSOMNIA, UNSPECIFIED TYPE: ICD-10-CM

## 2025-04-18 RX ORDER — TRAZODONE HYDROCHLORIDE 50 MG/1
TABLET ORAL
Qty: 135 TABLET | Refills: 0 | Status: SHIPPED | OUTPATIENT
Start: 2025-04-18

## 2025-04-23 ENCOUNTER — APPOINTMENT (OUTPATIENT)
Dept: PRIMARY CARE | Facility: CLINIC | Age: 60
End: 2025-04-23
Payer: MEDICARE

## 2025-04-25 NOTE — PROGRESS NOTES
Collaborative Care (CoCM)  Progress Note    Type of Interaction: In Office    Start Time: 205    End Time: 300        Appointment: Scheduled    Reason for Visit:   Chief Complaint   Patient presents with    Depression     Depression with anxiety    Clifford remains very depressed, stating it is getting more difficult for him to make him self get out of bed each day. He is still struggling with family issues related to his mother's home. Messaged his liver provider and  for psychiatrist today to facilitate him starting zyprexa. Was able to connect with liver doc who clears him to start zyprexa; psychiatrist able to get him an appointment on 4/30 at noon. Worked on mindfulness practice, specifically RAIN practice.       Interval History / Patient Symptoms:     No data recorded      Interventions Provided: Psychoeducation, Problem Solving Treatment, Acceptance & Commitment Therapy, Interpersonal Therapy, Develop Coping Strategies, Review Progress/Goals Stress Management, and Mindfulness      Progress Made: Mixed    Response to Intervention: Very tearful during RAIN exercise, feeling very alone; able to benefit from the idea that he needs to recognize and accept his emotions without pushing them away.         Plan:     Care Plan    There is no care plan documentation to display.         There are no Patient Instructions on file for this visit.      Follow Up / Next Appointment:  2 weeks

## 2025-04-30 ENCOUNTER — OFFICE VISIT (OUTPATIENT)
Dept: BEHAVIORAL HEALTH | Facility: HOSPITAL | Age: 60
End: 2025-04-30
Payer: MEDICARE

## 2025-04-30 DIAGNOSIS — F41.1 GAD (GENERALIZED ANXIETY DISORDER): ICD-10-CM

## 2025-04-30 DIAGNOSIS — F33.1 MAJOR DEPRESSIVE DISORDER, RECURRENT, MODERATE: ICD-10-CM

## 2025-04-30 DIAGNOSIS — G47.00 INSOMNIA, UNSPECIFIED TYPE: ICD-10-CM

## 2025-04-30 PROCEDURE — 99214 OFFICE O/P EST MOD 30 MIN: CPT | Performed by: PSYCHIATRY & NEUROLOGY

## 2025-04-30 PROCEDURE — G2211 COMPLEX E/M VISIT ADD ON: HCPCS | Performed by: PSYCHIATRY & NEUROLOGY

## 2025-04-30 PROCEDURE — 99214 OFFICE O/P EST MOD 30 MIN: CPT | Mod: AM | Performed by: PSYCHIATRY & NEUROLOGY

## 2025-04-30 RX ORDER — TRAZODONE HYDROCHLORIDE 100 MG/1
100 TABLET ORAL NIGHTLY
Qty: 90 TABLET | Refills: 0 | Status: SHIPPED | OUTPATIENT
Start: 2025-04-30 | End: 2025-07-29

## 2025-04-30 RX ORDER — MECOBALAMIN 5000 MCG
5 TABLET,DISINTEGRATING ORAL NIGHTLY PRN
Qty: 90 TABLET | Refills: 0 | Status: SHIPPED | OUTPATIENT
Start: 2025-04-30 | End: 2025-07-29

## 2025-04-30 RX ORDER — ESCITALOPRAM OXALATE 20 MG/1
20 TABLET ORAL DAILY
Qty: 90 TABLET | Refills: 0 | Status: SHIPPED | OUTPATIENT
Start: 2025-04-30 | End: 2025-07-29

## 2025-04-30 NOTE — PROGRESS NOTES
"   Adult Outpatient Psychiatric Clinic       Established patient: yes  Reason for visit: Medication management   Other services: supportive therapy  Pharmacy: [facility]    Chief complaint: recurrent depression, panic attacks with agoraphobia, severe low testosterone, liver and pancreas illness    Mr presented on time today for his appointment. Mr Recinos gave consent for Mrs Recinos to be present and provide collateral. Mr Recinos reported that since the last appointment, he has started testosterone replacement therapy.     Patient has reported early:  Early, slow, improved sleep, with decreased awakenings  No panic attacks (1 mild one on venturing out of the car into [employer] which he did, due to improvement in overall anxiety, had a mild panic attack, but finished shopping)  Decreased in intensity, frequency of stress dreams  Denied the sensation of an entity bringing him down, today stated it is more of the \"blues\"  Feeling better not being in the house all day due to weather changing    Current Outpatient Medications   Medication Instructions    amLODIPine (NORVASC) 10 mg, oral, Daily    blood-glucose meter (OneTouch Ultra2 Meter) misc USE TO CHECK BLOOD SUGAR    clonazePAM (KLONOPIN) 0.25 mg, oral, 2 times daily PRN, Per patient report    escitalopram (LEXAPRO) 20 mg, oral, Daily    lancets (OneTouch UltraSoft Lancets) misc Check BG as needed for hypoglycemia    melatonin 5 mg, Nightly    melatonin 5 mg, oral, Nightly PRN    OneTouch Ultra Test strip CHECK BLOOD GLUCOSE AS NEEDED FOR SYMPTOMS OF LOW BLOOD SUGAR    OneTouch Ultra2 Meter misc USE TO CHECK BLOOD SUGAR    testosterone (Axiron) 30 mg/actuation (1.5 mL) topical solution 2 Pump, transdermal, Every morning    traZODone (Desyrel) 50 mg tablet TAKE 1.5 TABLETS (75 MG) BY MOUTH AS NEEDED AT BEDTIME FOR SLEEP.      Vitals:  There were no vitals taken for this visit.    No results found for this or any previous visit (from the past 96 hours).     Mental Status " Exam  Appearance: appropriate grooming and hygiene, appears stated age, weight loss, muscle loss  Attitude:  calm, cooperative  Behavior: appropriate eye contact  Movement: no psychomotor agitation or retardation. No EPS/TD. Normal gait and station. Normal muscle tone/bulk  Speech and language:  regular rate, rhythm, volume and tone, spontaneous, fluent.   Mood: depressed  Affect:  less blunted  Thought process: Linear, organized, goal directed  Thought content: does not endorse suicidal ideation or homicidal ideations, questionable delusions of evil spirits vs past traumatic events, depressive cognitions  Perception: does not endorse auditory/visual hallucinations. Does not appear to be responding to hallucinatory stimuli.   Cognition:  alert and oriented to person, place, time, purpose, short and long term memory within normal limits, attention and concentration within normal limits  Insight:  fair, as patient recognizes symptoms of illness and need for recommended treatments.   Judgment:  can make reasonable decisions about ordinary activities of daily living and necessary medical care recommendations     Assessment & Plan  ALVARADO (generalized anxiety disorder)    Major depressive disorder, recurrent, moderate    Insomnia, unspecified type     Continue klonopin 0.5mg tab takes 1/2 prn 10-20 times a month if needed  Has not requested a refill today  Lexpro 20mg am  Trazodone 100 mg qhs    I spent 30 minutes in the professional and overall care of this patient.      Ema Anne MD

## 2025-05-05 DIAGNOSIS — K74.60 CIRRHOSIS OF LIVER WITHOUT ASCITES, UNSPECIFIED HEPATIC CIRRHOSIS TYPE (MULTI): Primary | ICD-10-CM

## 2025-05-07 ENCOUNTER — APPOINTMENT (OUTPATIENT)
Dept: PRIMARY CARE | Facility: CLINIC | Age: 60
End: 2025-05-07
Payer: COMMERCIAL

## 2025-05-14 ENCOUNTER — APPOINTMENT (OUTPATIENT)
Dept: PRIMARY CARE | Facility: CLINIC | Age: 60
End: 2025-05-14
Payer: COMMERCIAL

## 2025-05-22 ENCOUNTER — APPOINTMENT (OUTPATIENT)
Dept: ENDOCRINOLOGY | Facility: CLINIC | Age: 60
End: 2025-05-22
Payer: MEDICARE

## 2025-05-29 ENCOUNTER — APPOINTMENT (OUTPATIENT)
Dept: PRIMARY CARE | Facility: CLINIC | Age: 60
End: 2025-05-29
Payer: MEDICARE

## 2025-05-29 VITALS
HEIGHT: 71 IN | SYSTOLIC BLOOD PRESSURE: 130 MMHG | HEART RATE: 66 BPM | OXYGEN SATURATION: 98 % | WEIGHT: 188 LBS | DIASTOLIC BLOOD PRESSURE: 70 MMHG | BODY MASS INDEX: 26.32 KG/M2

## 2025-05-29 DIAGNOSIS — Z00.00 ROUTINE ADULT HEALTH MAINTENANCE: ICD-10-CM

## 2025-05-29 DIAGNOSIS — Z79.899 MEDICATION MANAGEMENT: ICD-10-CM

## 2025-05-29 DIAGNOSIS — F41.9 ANXIETY AND DEPRESSION: Primary | ICD-10-CM

## 2025-05-29 DIAGNOSIS — I10 ESSENTIAL HYPERTENSION: ICD-10-CM

## 2025-05-29 DIAGNOSIS — E29.1 HYPOGONADISM IN MALE: ICD-10-CM

## 2025-05-29 DIAGNOSIS — F41.1 GAD (GENERALIZED ANXIETY DISORDER): ICD-10-CM

## 2025-05-29 DIAGNOSIS — F32.A ANXIETY AND DEPRESSION: Primary | ICD-10-CM

## 2025-05-29 PROCEDURE — G2211 COMPLEX E/M VISIT ADD ON: HCPCS | Performed by: NURSE PRACTITIONER

## 2025-05-29 PROCEDURE — 3008F BODY MASS INDEX DOCD: CPT | Performed by: NURSE PRACTITIONER

## 2025-05-29 PROCEDURE — 3078F DIAST BP <80 MM HG: CPT | Performed by: NURSE PRACTITIONER

## 2025-05-29 PROCEDURE — 99213 OFFICE O/P EST LOW 20 MIN: CPT | Performed by: NURSE PRACTITIONER

## 2025-05-29 PROCEDURE — 3075F SYST BP GE 130 - 139MM HG: CPT | Performed by: NURSE PRACTITIONER

## 2025-05-29 RX ORDER — CLONAZEPAM 0.5 MG/1
0.25 TABLET ORAL 2 TIMES DAILY PRN
Qty: 60 TABLET | Refills: 2 | Status: CANCELLED | OUTPATIENT
Start: 2025-05-29 | End: 2025-06-28

## 2025-05-29 ASSESSMENT — ENCOUNTER SYMPTOMS
ABDOMINAL DISTENTION: 0
JOINT SWELLING: 0
DIARRHEA: 0
ADENOPATHY: 0
FATIGUE: 0
COUGH: 0
EYE PAIN: 0
DIZZINESS: 0
WHEEZING: 0
BRUISES/BLEEDS EASILY: 0
SEIZURES: 0
SHORTNESS OF BREATH: 0
BACK PAIN: 1
CONSTIPATION: 0
MYALGIAS: 0
CHILLS: 0
TREMORS: 1
ABDOMINAL PAIN: 0
FEVER: 0
WOUND: 0
WEAKNESS: 0
COLOR CHANGE: 0
NAUSEA: 0
DIFFICULTY URINATING: 0
PALPITATIONS: 1
HEADACHES: 0
TROUBLE SWALLOWING: 0

## 2025-05-29 ASSESSMENT — COLUMBIA-SUICIDE SEVERITY RATING SCALE - C-SSRS
2. HAVE YOU ACTUALLY HAD ANY THOUGHTS OF KILLING YOURSELF?: NO
6. HAVE YOU EVER DONE ANYTHING, STARTED TO DO ANYTHING, OR PREPARED TO DO ANYTHING TO END YOUR LIFE?: NO
1. IN THE PAST MONTH, HAVE YOU WISHED YOU WERE DEAD OR WISHED YOU COULD GO TO SLEEP AND NOT WAKE UP?: NO

## 2025-05-29 ASSESSMENT — PATIENT HEALTH QUESTIONNAIRE - PHQ9
2. FEELING DOWN, DEPRESSED OR HOPELESS: NOT AT ALL
1. LITTLE INTEREST OR PLEASURE IN DOING THINGS: NOT AT ALL
SUM OF ALL RESPONSES TO PHQ9 QUESTIONS 1 AND 2: 0

## 2025-05-29 NOTE — PATIENT INSTRUCTIONS
If you are in need of IMMEDIATE CRISIS COUNSELING please call the National Suicide and Crisis Prevention Lifeline: Call or Text 109 or 1-599.544.2037 (TALK)  Crisis Text Line: Text HOME to 114130      Please arrange for routine follow up in 3 months. You may schedule on-line through [employer] or call our office at [phone].

## 2025-05-29 NOTE — PROGRESS NOTES
Subjective   Patient ID: Clifford Recinos is a 59 y.o. male who presents for Follow-up (3 month ).    Patient seen today for routine follow-up and medication management.  Patient is currently working with several specialist due to several chronic health concerns.  Patient has had very low testosterone levels for some time and was recently initiated on low-dose testosterone replacement therapy.  He has scheduled follow-up in place with endocrinology in the near future for continued management of testosterone.  He states that he does not see any significant improvement as of yet from initiating testosterone.    Patient continues to follow with counseling services and psychiatry for his mental health management.  He states that his mood has been terrible for the past several weeks and he states that he does not want to do anything.  Patient states that he does not feel calm even with relatively routine colonoscopy needles.  He does report occasional thoughts of self-harm and feels that he has no self worth or purpose.  He denies any intent or plan for suicide and reports that his grandchildren are a reassuring factor for his mental health.  Patient does not have any weapons at home and has already spoke with his counselor today to schedule another appointment.  Spouse does not appear to be overly supportive with patient's mental health needs.  Also discussed with patient recent GI follow-up for fatty liver disease.  He reports that he will be undergoing a liver biopsy on 10 Millie.  No reported issues with appetite, staying hydrated, bowel or bladder.  No significant issues with vision or dentition.  Medications reviewed.  No other acute concerns voiced at this time.           Current Outpatient Medications on File Prior to Visit   Medication Sig Dispense Refill    amLODIPine (Norvasc) 10 mg tablet Take 1 tablet (10 mg) by mouth once daily. (Patient taking differently: Take 0.5 tablets (5 mg) by mouth once daily.) 90 tablet 3     blood-glucose meter (OneTouch Ultra2 Meter) misc USE TO CHECK BLOOD SUGAR 1 each 0    escitalopram (Lexapro) 20 mg tablet Take 1 tablet (20 mg) by mouth once daily. 90 tablet 0    lancets (OneTouch UltraSoft Lancets) misc Check BG as needed for hypoglycemia 100 each 2    melatonin 5 mg tablet,disintegrating Dissolve 5 mg in the mouth as needed at bedtime (sleep). 90 tablet 0    melatonin 5 mg tablet Take 1 tablet (5 mg) by mouth once daily at bedtime.      OneTouch Ultra Test strip CHECK BLOOD GLUCOSE AS NEEDED FOR SYMPTOMS OF LOW BLOOD SUGAR 100 strip 1    OneTouch Ultra2 Meter misc USE TO CHECK BLOOD SUGAR      testosterone (Axiron) 30 mg/actuation (1.5 mL) topical solution Place 2 Pump on the skin once daily in the morning. 1 each 2    traZODone (Desyrel) 100 mg tablet Take 1 tablet (100 mg) by mouth once daily at bedtime. 90 tablet 0    clonazePAM (KlonoPIN) 0.5 mg tablet Take 0.5 tablets (0.25 mg) by mouth 2 times a day as needed for anxiety. Per patient report 60 tablet 2     No current facility-administered medications on file prior to visit.       Past Medical History:   Diagnosis Date    Anemia     Anxiety     Arthritis 2010    BPH (benign prostatic hyperplasia)     Depression     Fracture of fourth toe, left, closed, initial encounter 10/18/2019    Fracture of fourth toe, left, open, with routine healing, subsequent encounter 10/14/2019    Headache 06.21.24    History of blood transfusion     GI bleed   no reaction    Hypertension     Hypogonadism male     Other instability, left knee 05/02/2018    Other instability, left knee    Pain in left knee 05/02/2018    Left knee pain, unspecified chronicity    Pain in thoracic spine 02/21/2022    Thoracic spine pain    Palpitations     PVCs (premature ventricular contractions)     Restless leg syndrome 2010    Scoliosis 2023    Sleep apnea     having home sleep study soon    Unspecified injury of thorax, initial encounter 02/21/2022    Rib injury    Vasovagal  "syncope     Visual impairment 2022    Wears glasses     Wedge compression fracture of unspecified thoracic vertebra, initial encounter for closed fracture (Multi) 02/23/2022    Thoracic compression fracture        Past Surgical History:   Procedure Laterality Date    APPENDECTOMY      CHOLECYSTECTOMY      COLONOSCOPY          Family History   Problem Relation Name Age of Onset    Other (CABG) Mother Natalia Recinos (bypass)     Heart disease Mother Natalia Recinos (bypass)     Other (cardiac pacemaker) Father Abisai Recinos     Other (malignant neoplasm of prostate) Father Abisai Grisel     Other (sepsis) Father Abisai Recinos     Other (stroke syndrome) Father Abisai Recinos     Depression Father Abisai Recinos     Mental illness Father Abisaikenia Recinos     Other (PTCA) Sister Suzan Wang     Depression Sister Suzan Wang     Heart disease Sister Suzan Wang     Depression Brother Mila     Hypertension Brother Carlitos and Star         Review of Systems   Constitutional:  Negative for chills, fatigue and fever.   HENT:  Negative for dental problem and trouble swallowing.    Eyes:  Negative for pain and visual disturbance.        Wears  glasses   Respiratory:  Negative for cough, shortness of breath and wheezing.    Cardiovascular:  Positive for palpitations. Negative for chest pain and leg swelling.        Positive for occasional \"flutter \"   Gastrointestinal:  Negative for abdominal distention, abdominal pain, constipation, diarrhea and nausea.        Positive for IBS   Endocrine: Negative for cold intolerance and heat intolerance.   Genitourinary:  Negative for difficulty urinating.   Musculoskeletal:  Positive for back pain. Negative for gait problem, joint swelling and myalgias.   Skin:  Negative for color change, pallor, rash and wound.   Allergic/Immunologic: Negative for environmental allergies and food allergies.   Neurological:  Positive for tremors. Negative for dizziness, seizures, weakness and headaches.   Hematological:  " "Negative for adenopathy. Does not bruise/bleed easily.   Psychiatric/Behavioral:  Negative for behavioral problems.         Positve for anxiety and depression   All other systems reviewed and are negative.      Objective   /70   Pulse 66   Ht 1.803 m (5' 11\")   Wt 85.3 kg (188 lb)   SpO2 98%   BMI 26.22 kg/m²     Physical Exam  Constitutional:       General: He is not in acute distress.     Appearance: Normal appearance. He is not toxic-appearing.   HENT:      Head: Normocephalic and atraumatic.      Right Ear: External ear normal.      Left Ear: External ear normal.      Nose: Nose normal.      Mouth/Throat:      Mouth: Mucous membranes are moist.      Pharynx: Oropharynx is clear.   Eyes:      Extraocular Movements: Extraocular movements intact.      Conjunctiva/sclera: Conjunctivae normal.   Cardiovascular:      Rate and Rhythm: Normal rate and regular rhythm.      Pulses: Normal pulses.      Heart sounds: Normal heart sounds. No murmur heard.  Pulmonary:      Effort: Pulmonary effort is normal.      Breath sounds: Normal breath sounds. No wheezing.   Abdominal:      General: Bowel sounds are normal.      Palpations: Abdomen is soft.   Musculoskeletal:         General: No swelling.      Cervical back: Normal range of motion and neck supple.   Skin:     General: Skin is warm and dry.   Neurological:      Mental Status: He is alert and oriented to person, place, and time. Mental status is at baseline.      Cranial Nerves: No cranial nerve deficit.      Motor: No weakness.      Comments: Positive for tremor   Psychiatric:         Mood and Affect: Mood normal.         Behavior: Behavior normal.         Thought Content: Thought content normal.         Judgment: Judgment normal.         Assessment/Plan   Problem List Items Addressed This Visit           ICD-10-CM    Anxiety and depression - Primary F41.9, F32.A    Will reach out to patient's psychiatrist regarding additional recommendations regarding " medications.  Scheduled follow-up with counseling in place.  Consider OP/IP placement for continued medication management?         Essential hypertension I10    Patient encouraged to continue current medication regiment.  Will continue to monitor vital signs at subsequent visits.  Provider to be notified for any new cardiac concerns.         Hypogonadism in male E29.1    Patient continues on low-dose testosterone replacement therapy.  Consent obtained and discussed risk/benefits of hormone replacement therapy.  Follow-up appointment with endocrinology in place to continue management of low testosterone levels.  Patient to have blood work rechecked in 3 months for surveillance purposes  I have personally reviewed the OARRS report for the patient. This report is scanned into the electronic medical record. I have considered the risks of abuse, dependence, addiction and diversion. I believe that it is clinically appropriate for the patient to be prescribed this medication          Routine adult health maintenance Z00.00    Most recent blood work reviewed.  Will continue to monitor as appropriate  -Recent colonoscopy completed in 2024 with recommendations for repeat screening in 5 years (2029)          Other Visit Diagnoses         Codes      ALVARADO (generalized anxiety disorder)     F41.1      Medication management     Z79.899    Relevant Orders    Drug Screen, Urine With Reflex to Confirmation (Completed)

## 2025-05-30 LAB
AMPHETAMINES UR QL: NEGATIVE NG/ML
BARBITURATES UR QL: NEGATIVE NG/ML
BASOPHILS # BLD AUTO: 41 CELLS/UL (ref 0–200)
BASOPHILS NFR BLD AUTO: 0.7 %
BENZODIAZ UR QL: NEGATIVE NG/ML
BZE UR QL: NEGATIVE NG/ML
CREAT UR-MCNC: 271.7 MG/DL
EOSINOPHIL # BLD AUTO: 29 CELLS/UL (ref 15–500)
EOSINOPHIL NFR BLD AUTO: 0.5 %
ERYTHROCYTE [DISTWIDTH] IN BLOOD BY AUTOMATED COUNT: 17.1 % (ref 11–15)
FENTANYL UR QL SCN: NEGATIVE NG/ML
HCT VFR BLD AUTO: 33 % (ref 38.5–50)
HGB BLD-MCNC: 10.6 G/DL (ref 13.2–17.1)
INR PPP: 1
LYMPHOCYTES # BLD AUTO: 737 CELLS/UL (ref 850–3900)
LYMPHOCYTES NFR BLD AUTO: 12.7 %
MCH RBC QN AUTO: 33.7 PG (ref 27–33)
MCHC RBC AUTO-ENTMCNC: 32.1 G/DL (ref 32–36)
MCV RBC AUTO: 104.8 FL (ref 80–100)
METHADONE UR QL: NEGATIVE NG/ML
MONOCYTES # BLD AUTO: 348 CELLS/UL (ref 200–950)
MONOCYTES NFR BLD AUTO: 6 %
NEUTROPHILS # BLD AUTO: 4646 CELLS/UL (ref 1500–7800)
NEUTROPHILS NFR BLD AUTO: 80.1 %
OPIATES UR QL: NEGATIVE NG/ML
OXIDANTS UR QL: NEGATIVE MCG/ML
OXYCODONE UR QL: NEGATIVE NG/ML
PCP UR QL: NEGATIVE NG/ML
PH UR: 5.3 [PH] (ref 4.5–9)
PLATELET # BLD AUTO: 353 THOUSAND/UL (ref 140–400)
PMV BLD REES-ECKER: 12.1 FL (ref 7.5–12.5)
PROTHROMBIN TIME: 11.1 SEC (ref 9–11.5)
QUEST NOTES AND COMMENTS: NORMAL
RBC # BLD AUTO: 3.15 MILLION/UL (ref 4.2–5.8)
THC UR QL: NEGATIVE NG/ML
WBC # BLD AUTO: 5.8 THOUSAND/UL (ref 3.8–10.8)

## 2025-06-03 ENCOUNTER — APPOINTMENT (OUTPATIENT)
Dept: ENDOCRINOLOGY | Facility: CLINIC | Age: 60
End: 2025-06-03
Payer: MEDICARE

## 2025-06-03 VITALS
BODY MASS INDEX: 25.03 KG/M2 | HEART RATE: 56 BPM | SYSTOLIC BLOOD PRESSURE: 132 MMHG | WEIGHT: 184.8 LBS | TEMPERATURE: 97.9 F | DIASTOLIC BLOOD PRESSURE: 66 MMHG | HEIGHT: 72 IN

## 2025-06-03 DIAGNOSIS — E29.1 PRIMARY HYPOGONADISM IN MALE: Primary | ICD-10-CM

## 2025-06-03 PROCEDURE — 1036F TOBACCO NON-USER: CPT | Performed by: STUDENT IN AN ORGANIZED HEALTH CARE EDUCATION/TRAINING PROGRAM

## 2025-06-03 PROCEDURE — 3008F BODY MASS INDEX DOCD: CPT | Performed by: STUDENT IN AN ORGANIZED HEALTH CARE EDUCATION/TRAINING PROGRAM

## 2025-06-03 PROCEDURE — 3078F DIAST BP <80 MM HG: CPT | Performed by: STUDENT IN AN ORGANIZED HEALTH CARE EDUCATION/TRAINING PROGRAM

## 2025-06-03 PROCEDURE — 3075F SYST BP GE 130 - 139MM HG: CPT | Performed by: STUDENT IN AN ORGANIZED HEALTH CARE EDUCATION/TRAINING PROGRAM

## 2025-06-03 PROCEDURE — 99214 OFFICE O/P EST MOD 30 MIN: CPT | Performed by: STUDENT IN AN ORGANIZED HEALTH CARE EDUCATION/TRAINING PROGRAM

## 2025-06-03 RX ORDER — TESTOSTERONE GEL, 1% 10 MG/G
GEL TRANSDERMAL
Qty: 30 PACKET | Refills: 5 | Status: SHIPPED | OUTPATIENT
Start: 2025-06-03

## 2025-06-03 NOTE — ASSESSMENT & PLAN NOTE
Patient continues on low-dose testosterone replacement therapy.  Consent obtained and discussed risk/benefits of hormone replacement therapy.  Follow-up appointment with endocrinology in place to continue management of low testosterone levels.  Patient to have blood work rechecked in 3 months for surveillance purposes  I have personally reviewed the OARRS report for the patient. This report is scanned into the electronic medical record. I have considered the risks of abuse, dependence, addiction and diversion. I believe that it is clinically appropriate for the patient to be prescribed this medication

## 2025-06-03 NOTE — ASSESSMENT & PLAN NOTE
Will reach out to patient's psychiatrist regarding additional recommendations regarding medications.  Scheduled follow-up with counseling in place.  Consider OP/IP placement for continued medication management?

## 2025-06-03 NOTE — ASSESSMENT & PLAN NOTE
Most recent blood work reviewed.  Will continue to monitor as appropriate  -Recent colonoscopy completed in 2024 with recommendations for repeat screening in 5 years (2029)   no

## 2025-06-03 NOTE — PROGRESS NOTES
58 M PMH: PVC, IBS, cholecystectomy, depression, testicular torsion s/p surgery, history of seizures during childhood, pancreatitis, fatty liver     Following up for hypogonadism, last seen more than a year ago     Initially evaluated for secondary causes of fatigue     Has history of normocytic anemia and leukopenia seen Heme Onc, their though is the fatigue is disproportionate to the degree of anemia and was advised to look for other contributing factor     His main symptoms include fatigue, inability to tolerate physical activity   Has one child did not have any known issues with fertility     Testicular surgery for torsion   Head trauma-playing baseball     Endo work up:  Fsh 29, LH 8.2   Testosterone total 31, free T 4   TFT wnl  Am cortisol 17    Testiscular ultrasound  Right orchiectomy   Left hemiscrotum no over abnormality    He had history of untreated sleep apnea to was referred to sleep medicine prior to starting HRT, and some other comorbidities including GI bleed so HRT was held at that time     Had a sleep study in 1/2025     Testosterone was started by PCP 4/2025  Topical testosterone 2 pumps daily         Past Medical History:   Diagnosis Date    Anemia     Anxiety     Arthritis 2010    BPH (benign prostatic hyperplasia)     Depression     Fracture of fourth toe, left, closed, initial encounter 10/18/2019    Fracture of fourth toe, left, open, with routine healing, subsequent encounter 10/14/2019    Headache 06.21.24    History of blood transfusion     GI bleed   no reaction    Hypertension     Hypogonadism male     Other instability, left knee 05/02/2018    Other instability, left knee    Pain in left knee 05/02/2018    Left knee pain, unspecified chronicity    Pain in thoracic spine 02/21/2022    Thoracic spine pain    Palpitations     PVCs (premature ventricular contractions)     Restless leg syndrome 2010    Scoliosis 2023    Sleep apnea     having home sleep study soon    Unspecified injury of  thorax, initial encounter 02/21/2022    Rib injury    Vasovagal syncope     Visual impairment 2022    Wears glasses     Wedge compression fracture of unspecified thoracic vertebra, initial encounter for closed fracture (Multi) 02/23/2022    Thoracic compression fracture     Family History   Problem Relation Name Age of Onset    Other (CABG) Mother Natalia Recinos (bypass)     Heart disease Mother Natalia Recinos (bypass)     Other (cardiac pacemaker) Father Abisai Recinos     Other (malignant neoplasm of prostate) Father Abisai Recinos     Other (sepsis) Father Abisai Recinos     Other (stroke syndrome) Father Abisai Recinos     Depression Father Abisai Recinos     Mental illness Father Abisai Recinos     Other (PTCA) Sister Suzan Wang     Depression Sister Suzan Wang     Heart disease Sister Suzan Wang     Depression Brother Mila     Hypertension Brother Carlitos and Star      Social History     Socioeconomic History    Marital status:      Spouse name: Not on file    Number of children: 1    Years of education: Not on file    Highest education level: Not on file   Occupational History    Occupation: UNEMPLOYED   Tobacco Use    Smoking status: Never     Passive exposure: Never    Smokeless tobacco: Never   Vaping Use    Vaping status: Never Used   Substance and Sexual Activity    Alcohol use: Yes     Comment: On occasion...    Drug use: Never    Sexual activity: Not Currently     Partners: Female     Comment: Currently being treated for ED by Ada Newman MD   Other Topics Concern    Not on file   Social History Narrative    Not on file     Social Drivers of Health     Financial Resource Strain: Low Risk  (4/28/2024)    Overall Financial Resource Strain (CARDIA)     Difficulty of Paying Living Expenses: Not hard at all   Food Insecurity: No Food Insecurity (12/16/2024)    Hunger Vital Sign     Worried About Running Out of Food in the Last Year: Never true     Ran Out of Food in the Last Year: Never true   Transportation  Needs: No Transportation Needs (4/28/2024)    PRAPARE - Transportation     Lack of Transportation (Medical): No     Lack of Transportation (Non-Medical): No   Physical Activity: Not on file   Stress: Not on file   Social Connections: Not on file   Intimate Partner Violence: Not on file   Housing Stability: Low Risk  (4/28/2024)    Housing Stability Vital Sign     Unable to Pay for Housing in the Last Year: No     Number of Places Lived in the Last Year: 1     Unstable Housing in the Last Year: No   Social- currently does not work     Fatigue   Hot flashes has improved   Has seen morning erections   Still with low libido   Still with intolerance of physical activity   ROS reviewed and is negative except for pertinent findings noted on HPI    Physical Exam  Constitutional:       Appearance: Normal appearance.   Cardiovascular:      Rate and Rhythm: Normal rate. Rhythm irregular.   Skin:     General: Skin is warm.      Comments: Pale, acne on the face, normal hair distribution   Neurological:      General: No focal deficit present.      Mental Status: He is alert and oriented to person, place, and time.   Psychiatric:         Mood and Affect: Mood normal.         Behavior: Behavior normal.           labs and imaging reviewed, pertinent findings listed on HPI and Impression    Problem List Items Addressed This Visit       Primary hypogonadism in male - Primary    Relevant Medications    testosterone 50 mg/5 gram (1 %) gel    Other Relevant Orders    CBC    Testosterone,Free and Total       Lost about 50 lbs over the last year, unintentional   Was switched to lexapro, and also eating much less     Does not feel a significant difference after starting HRT  Previous cortisol screening was negative     If with no significant improvement of symptoms, then would need to look for other causes.  Classic symptoms of hypogonadism has improved such as hot flashes and morning erections.  Concern if he continues to have  unintentional weight loss     Switch to testosterone gel packets 1 packet daily   Labs now  Expect this will be increased to 2 packets daily     Follow up in 6 months

## 2025-06-03 NOTE — PATIENT INSTRUCTIONS
Labs in the morning around 8 am   Testosterone 1 packet daily     Follow up in 6 months    Apolonia Larry MD  Divison of Endocrinology   [facility]   Phone: [phone]    option 4, then option 1  Fax: [phone]

## 2025-06-09 ENCOUNTER — APPOINTMENT (OUTPATIENT)
Dept: PRIMARY CARE | Facility: CLINIC | Age: 60
End: 2025-06-09
Payer: MEDICARE

## 2025-06-10 ENCOUNTER — HOSPITAL ENCOUNTER (OUTPATIENT)
Dept: CARDIOLOGY | Facility: HOSPITAL | Age: 60
Setting detail: OUTPATIENT SURGERY
Discharge: HOME | End: 2025-06-10
Payer: MEDICARE

## 2025-06-10 VITALS
DIASTOLIC BLOOD PRESSURE: 53 MMHG | HEART RATE: 45 BPM | SYSTOLIC BLOOD PRESSURE: 127 MMHG | OXYGEN SATURATION: 97 % | RESPIRATION RATE: 18 BRPM

## 2025-06-10 DIAGNOSIS — K74.60 CIRRHOSIS OF LIVER WITHOUT ASCITES, UNSPECIFIED HEPATIC CIRRHOSIS TYPE (MULTI): ICD-10-CM

## 2025-06-10 PROCEDURE — 2500000005 HC RX 250 GENERAL PHARMACY W/O HCPCS: Performed by: RADIOLOGY

## 2025-06-10 PROCEDURE — 37200 TRANSCATHETER BIOPSY: CPT | Performed by: RADIOLOGY

## 2025-06-10 PROCEDURE — C1769 GUIDE WIRE: HCPCS

## 2025-06-10 PROCEDURE — 7100000010 HC PHASE TWO TIME - EACH INCREMENTAL 1 MINUTE

## 2025-06-10 PROCEDURE — 99152 MOD SED SAME PHYS/QHP 5/>YRS: CPT | Performed by: RADIOLOGY

## 2025-06-10 PROCEDURE — 2720000007 HC OR 272 NO HCPCS

## 2025-06-10 PROCEDURE — C1894 INTRO/SHEATH, NON-LASER: HCPCS

## 2025-06-10 PROCEDURE — 2500000004 HC RX 250 GENERAL PHARMACY W/ HCPCS (ALT 636 FOR OP/ED): Performed by: RADIOLOGY

## 2025-06-10 PROCEDURE — 99153 MOD SED SAME PHYS/QHP EA: CPT

## 2025-06-10 PROCEDURE — 99152 MOD SED SAME PHYS/QHP 5/>YRS: CPT

## 2025-06-10 PROCEDURE — 2550000001 HC RX 255 CONTRASTS: Performed by: RADIOLOGY

## 2025-06-10 PROCEDURE — 7100000009 HC PHASE TWO TIME - INITIAL BASE CHARGE

## 2025-06-10 PROCEDURE — 75889 VEIN X-RAY LIVER W/HEMODYNAM: CPT

## 2025-06-10 RX ORDER — MIDAZOLAM HYDROCHLORIDE 1 MG/ML
INJECTION, SOLUTION INTRAMUSCULAR; INTRAVENOUS AS NEEDED
Status: DISCONTINUED | OUTPATIENT
Start: 2025-06-10 | End: 2025-06-10 | Stop reason: HOSPADM

## 2025-06-10 RX ORDER — LIDOCAINE HYDROCHLORIDE 20 MG/ML
INJECTION, SOLUTION INFILTRATION; PERINEURAL AS NEEDED
Status: DISCONTINUED | OUTPATIENT
Start: 2025-06-10 | End: 2025-06-10 | Stop reason: HOSPADM

## 2025-06-10 RX ORDER — FENTANYL CITRATE 50 UG/ML
INJECTION, SOLUTION INTRAMUSCULAR; INTRAVENOUS AS NEEDED
Status: DISCONTINUED | OUTPATIENT
Start: 2025-06-10 | End: 2025-06-10 | Stop reason: HOSPADM

## 2025-06-10 RX ADMIN — IOHEXOL 5 ML: 350 INJECTION, SOLUTION INTRAVENOUS at 15:34

## 2025-06-10 RX ADMIN — MIDAZOLAM 1 MG: 1 INJECTION INTRAMUSCULAR; INTRAVENOUS at 15:02

## 2025-06-10 RX ADMIN — FENTANYL CITRATE 100 MCG: 50 INJECTION, SOLUTION INTRAMUSCULAR; INTRAVENOUS at 15:03

## 2025-06-10 RX ADMIN — Medication 2 L/MIN: at 15:04

## 2025-06-10 ASSESSMENT — PAIN SCALES - GENERAL
PAINLEVEL_OUTOF10: 2
PAINLEVEL_OUTOF10: 0 - NO PAIN

## 2025-06-10 ASSESSMENT — PAIN - FUNCTIONAL ASSESSMENT
PAIN_FUNCTIONAL_ASSESSMENT: 0-10

## 2025-06-10 NOTE — Clinical Note
Prepped with ChloraPrep, a minimum of 3 minute dry time, longer if needed, no pooling noted, patient draped in sterile fashion and Right IJ.

## 2025-06-10 NOTE — PRE-PROCEDURE NOTE
Interventional Radiology Preprocedure Note    Indication for procedure: The encounter diagnosis was Cirrhosis of liver without ascites, unspecified hepatic cirrhosis type (Multi).    Relevant review of systems: NA    Relevant Labs:   Lab Results   Component Value Date    CREATININE 0.69 (L) 04/01/2025    EGFR 107 04/01/2025    INR 1.0 05/29/2025    PROTIME 11.1 05/29/2025       Planned Sedation/Anesthesia: Moderate    Airway assessment: normal    Directed physical examination:    RRR  CTA    Mallampati: II (hard and soft palate, upper portion of tonsils and uvula visible)    ASA Score: ASA 2 - Patient with mild systemic disease with no functional limitations    Benefits, risks and alternatives of procedure and planned sedation have been discussed with the patient and/or their representative. All questions answered and they agree to proceed.

## 2025-06-10 NOTE — Clinical Note
Closure device placed in the vein. Site closed by manually applied. Deployed By: Paramjit Trinidad RT

## 2025-06-10 NOTE — POST-PROCEDURE NOTE
Interventional Radiology Brief Postprocedure Note    Attending: Duc Valencia MD      Assistant: none    Diagnosis: cirrhosis    Description of procedure: liver biopsy with pressures     Anesthesia:  Local, mod sed    Complications: None    Estimated Blood Loss: minimal    specimens collected      See detailed result report with images in PACS.    The patient tolerated the procedure well without incident or complication.

## 2025-06-10 NOTE — SIGNIFICANT EVENT
Discharge instructions given to Pt and wife, no questions or complaints at this time. IV removed and Pt wheeled to front lobby.

## 2025-06-16 NOTE — PROGRESS NOTES
"Collaborative Care (CoCM)  Progress Note    Type of Interaction: In Office    Start Time: 200    End Time: 300        Appointment: Scheduled    Reason for Visit:   Chief Complaint   Patient presents with    Depression     Depression with anxiety    Today, Clifford reports no improvement in his depressive symptoms. He is anxious about a liver biopsy tomorrow, feels quite frustrated that his psychiatrist has not changed his medications, although he admits that when his wife is with him, he will agree that he is feeling better even though this is not truthful. Encouraged him to be honest with his providers, or to attend appointments alone. He is grieving his mom's house being sold, as he had a dream to purchase it and fix it up. Feels very estranged from his family. Reports nightmares with violent content and \"dark presence\" in dreams. Provided empathy, support, and discussed mindfulness and CBT strategies that may be helpful for him while he waits for medication change. He is frustrated that the testosterone therapy is not having much effect on his mood.      Interval History / Patient Symptoms:     No data recorded      Interventions Provided: Behavioral Activation, Acceptance & Commitment Therapy, Strengths Exploration, Values Exploration, Review Progress/Goals Stress Management, and Mindfulness      Progress Made: Minimum    Response to Intervention: Open, engaged, tearful at times; does report noticing a small improvement in overall energy levels. Appreciates being able to express how jerry is feeling honestly.        Plan:     Care Plan    There is no care plan documentation to display.         There are no Patient Instructions on file for this visit.      Follow Up / Next Appointment:    2 weeks    "

## 2025-06-17 LAB
LABORATORY COMMENT REPORT: NORMAL
PATH REPORT.FINAL DX SPEC: NORMAL
PATH REPORT.GROSS SPEC: NORMAL
PATH REPORT.RELEVANT HX SPEC: NORMAL
PATH REPORT.TOTAL CANCER: NORMAL

## 2025-06-18 ENCOUNTER — APPOINTMENT (OUTPATIENT)
Dept: PRIMARY CARE | Facility: CLINIC | Age: 60
End: 2025-06-18
Payer: MEDICARE

## 2025-06-23 ENCOUNTER — OFFICE VISIT (OUTPATIENT)
Dept: HEMATOLOGY/ONCOLOGY | Facility: CLINIC | Age: 60
End: 2025-06-23
Payer: MEDICARE

## 2025-06-23 ENCOUNTER — HOSPITAL ENCOUNTER (OUTPATIENT)
Dept: RADIOLOGY | Facility: HOSPITAL | Age: 60
Discharge: HOME | End: 2025-06-23
Payer: MEDICARE

## 2025-06-23 VITALS
TEMPERATURE: 98.1 F | SYSTOLIC BLOOD PRESSURE: 144 MMHG | BODY MASS INDEX: 25.18 KG/M2 | WEIGHT: 185.63 LBS | DIASTOLIC BLOOD PRESSURE: 69 MMHG | RESPIRATION RATE: 17 BRPM | OXYGEN SATURATION: 97 % | HEART RATE: 57 BPM

## 2025-06-23 DIAGNOSIS — D64.9 ANEMIA, UNSPECIFIED TYPE: ICD-10-CM

## 2025-06-23 DIAGNOSIS — E29.1 HYPOGONADISM IN MALE: ICD-10-CM

## 2025-06-23 DIAGNOSIS — R73.9 HYPERGLYCEMIA: ICD-10-CM

## 2025-06-23 DIAGNOSIS — Z12.5 SCREENING FOR PROSTATE CANCER: ICD-10-CM
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
## 2025-06-23 DIAGNOSIS — I10 ESSENTIAL HYPERTENSION: ICD-10-CM

## 2025-06-23 DIAGNOSIS — D50.9 IRON DEFICIENCY ANEMIA, UNSPECIFIED IRON DEFICIENCY ANEMIA TYPE: ICD-10-CM
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
## 2025-06-23 LAB
ALBUMIN SERPL BCP-MCNC: 4.8 G/DL (ref 3.4–5)
ALP SERPL-CCNC: 36 U/L (ref 33–120)
ALT SERPL W P-5'-P-CCNC: 8 U/L (ref 10–52)
ANION GAP SERPL CALC-SCNC: 11 MMOL/L (ref 10–20)
AST SERPL W P-5'-P-CCNC: 10 U/L (ref 9–39)
BASOPHILS # BLD AUTO: 0.04 X10*3/UL (ref 0–0.1)
BASOPHILS NFR BLD AUTO: 0.8 %
BILIRUB SERPL-MCNC: 1.4 MG/DL (ref 0–1.2)
BUN SERPL-MCNC: 14 MG/DL (ref 6–23)
CALCIUM SERPL-MCNC: 9.4 MG/DL (ref 8.6–10.3)
CHLORIDE SERPL-SCNC: 106 MMOL/L (ref 98–107)
CO2 SERPL-SCNC: 29 MMOL/L (ref 21–32)
CREAT SERPL-MCNC: 0.65 MG/DL (ref 0.5–1.3)
EGFRCR SERPLBLD CKD-EPI 2021: >90 ML/MIN/1.73M*2
EOSINOPHIL # BLD AUTO: 0.03 X10*3/UL (ref 0–0.7)
EOSINOPHIL NFR BLD AUTO: 0.6 %
ERYTHROCYTE [DISTWIDTH] IN BLOOD BY AUTOMATED COUNT: 17.4 % (ref 11.5–14.5)
FERRITIN SERPL-MCNC: 144 NG/ML (ref 20–300)
GLUCOSE SERPL-MCNC: 112 MG/DL (ref 74–99)
HCT VFR BLD AUTO: 32.3 % (ref 41–52)
HGB BLD-MCNC: 10.7 G/DL (ref 13.5–17.5)
IMM GRANULOCYTES # BLD AUTO: 0.01 X10*3/UL (ref 0–0.7)
IMM GRANULOCYTES NFR BLD AUTO: 0.2 % (ref 0–0.9)
IRON SATN MFR SERPL: 37 % (ref 25–45)
IRON SERPL-MCNC: 122 UG/DL (ref 35–150)
LYMPHOCYTES # BLD AUTO: 0.47 X10*3/UL (ref 1.2–4.8)
LYMPHOCYTES NFR BLD AUTO: 9.6 %
MCH RBC QN AUTO: 34.5 PG (ref 26–34)
MCHC RBC AUTO-ENTMCNC: 33.1 G/DL (ref 32–36)
MCV RBC AUTO: 104 FL (ref 80–100)
MONOCYTES # BLD AUTO: 0.28 X10*3/UL (ref 0.1–1)
MONOCYTES NFR BLD AUTO: 5.7 %
NEUTROPHILS # BLD AUTO: 4.08 X10*3/UL (ref 1.2–7.7)
NEUTROPHILS NFR BLD AUTO: 83.1 %
PLATELET # BLD AUTO: 333 X10*3/UL (ref 150–450)
POTASSIUM SERPL-SCNC: 4 MMOL/L (ref 3.5–5.3)
PROT SERPL-MCNC: 7.4 G/DL (ref 6.4–8.2)
RBC # BLD AUTO: 3.1 X10*6/UL (ref 4.5–5.9)
SODIUM SERPL-SCNC: 142 MMOL/L (ref 136–145)
TIBC SERPL-MCNC: 329 UG/DL (ref 240–445)
UIBC SERPL-MCNC: 207 UG/DL (ref 110–370)
WBC # BLD AUTO: 4.9 X10*3/UL (ref 4.4–11.3)

## 2025-06-23 PROCEDURE — 85025 COMPLETE CBC W/AUTO DIFF WBC: CPT | Performed by: PHYSICIAN ASSISTANT

## 2025-06-23 PROCEDURE — 72220 X-RAY EXAM SACRUM TAILBONE: CPT

## 2025-06-23 PROCEDURE — 83540 ASSAY OF IRON: CPT | Performed by: PHYSICIAN ASSISTANT

## 2025-06-23 PROCEDURE — 36415 COLL VENOUS BLD VENIPUNCTURE: CPT | Performed by: PHYSICIAN ASSISTANT

## 2025-06-23 PROCEDURE — 99214 OFFICE O/P EST MOD 30 MIN: CPT | Performed by: PHYSICIAN ASSISTANT

## 2025-06-23 PROCEDURE — 3077F SYST BP >= 140 MM HG: CPT | Performed by: PHYSICIAN ASSISTANT

## 2025-06-23 PROCEDURE — 80053 COMPREHEN METABOLIC PANEL: CPT | Performed by: PHYSICIAN ASSISTANT

## 2025-06-23 PROCEDURE — 72072 X-RAY EXAM THORAC SPINE 3VWS: CPT | Performed by: RADIOLOGY

## 2025-06-23 PROCEDURE — 84153 ASSAY OF PSA TOTAL: CPT | Mod: GEALAB | Performed by: PHYSICIAN ASSISTANT

## 2025-06-23 PROCEDURE — 82728 ASSAY OF FERRITIN: CPT | Performed by: PHYSICIAN ASSISTANT

## 2025-06-23 PROCEDURE — 72072 X-RAY EXAM THORAC SPINE 3VWS: CPT

## 2025-06-23 PROCEDURE — 72220 X-RAY EXAM SACRUM TAILBONE: CPT | Performed by: RADIOLOGY

## 2025-06-23 PROCEDURE — 84402 ASSAY OF FREE TESTOSTERONE: CPT | Performed by: PHYSICIAN ASSISTANT

## 2025-06-23 PROCEDURE — 3078F DIAST BP <80 MM HG: CPT | Performed by: PHYSICIAN ASSISTANT

## 2025-06-23 PROCEDURE — 82607 VITAMIN B-12: CPT | Mod: GEALAB | Performed by: PHYSICIAN ASSISTANT

## 2025-06-23 PROCEDURE — 72100 X-RAY EXAM L-S SPINE 2/3 VWS: CPT | Performed by: RADIOLOGY

## 2025-06-23 PROCEDURE — 72100 X-RAY EXAM L-S SPINE 2/3 VWS: CPT

## 2025-06-23 ASSESSMENT — PAIN SCALES - GENERAL: PAINLEVEL_OUTOF10: 6

## 2025-06-23 NOTE — PROGRESS NOTES
Patient Visit Information:   Visit Type: Benign Heme Follow-up     Cancer History:   Treatment Synopsis:    pt is a pleasant 57-year-old  male was referred by Dr. De for evaluation of leukopenia and normocytic anemia     pt has been feeling tired for many years and has been progressively worsened. c/o leg and join and whole body muscle achiness. had lots of fracture in the past.     cbc in 6/2022 showed hgb 11.3, down from 12.5 in 4/2022. stated was also found to have anemic 2 years ago at [facility].    denies bleeding. last colonoscopy was maybe 6 yrs ago per pt. otherwise doing fine denies fever chills night sweats decreased appetite weight loss palpitation chest pain nausea vomiting diarrhea constipation abdominal pain or urinary symptoms    Past medical history anxiety depression, hypertension, back pain, irritable bowel syndrome, compression fracture of T9 vertebra,     Admitted after syncopal episode. Noted to have worsening anemia. Reports having intermittent black stools and BRBPR. Outpatient C-scope and EGD with polyps removed in transverse and descending colon path c/w TA, hemorrhoids, Hiatal hernia without [place] lesions     Past surgical history appendectomy cholecystectomy    Family history prostate cancer    History of Present Illness:   Patient presents for follow up accompanied by his wife. Fell off slide last week while playing with grandson. Continues to have chronic fatigue has been progressively worsening.  Continues to have medication changes for his anxiety and currently on Trazodone and Lexapro. Notes good appetite but losing weight unclear if related to medication changes. Recent CT scan showing splenomegaly potentially related to cirrhosis. Otherwise doing well.     Review of Systems:    All of the systems have been reviewed and are negative for complaints except what is stated in the HPI and/or past medical history    Allergies and Intolerances:  Allergies   Allergen Reactions    Bee  Pollen Anaphylaxis    Beeswax Anaphylaxis    Ketorolac Anaphylaxis    Ketorolac Tromethamine Anaphylaxis    Pseudoephedrine Other    Wellbutrin [Bupropion Hcl] Anxiety, Other and Headache     urinary incontinence    Sudafed [Pseudoephedrine Hcl] Other     HEART POUNDING    Buspar [Buspirone] Other     Abnormal stools  (orange color ) , stomach cramps , 2 days after stopping it resolved     Cefdinir Diarrhea    Levofloxacin Other     Insomnia, heart pounding, gen. shaking, frequency    Nsaids (Non-Steroidal Anti-Inflammatory Drug) GI bleeding    Oxycodone-Acetaminophen Other     HA, angry    Propoxyphene N-Acetaminophen Other     HA, vision change    Doxycycline Rash     Outpatient Medication Profile:   Current Outpatient Medications on File Prior to Visit   Medication Sig Dispense Refill    amLODIPine (Norvasc) 10 mg tablet Take 1 tablet (10 mg) by mouth once daily. (Patient taking differently: Take 0.5 tablets (5 mg) by mouth once daily.) 90 tablet 3    blood-glucose meter (OneTouch Ultra2 Meter) misc USE TO CHECK BLOOD SUGAR 1 each 0    clonazePAM (KlonoPIN) 0.5 mg tablet Take 0.5 tablets (0.25 mg) by mouth 2 times a day as needed for anxiety. Per patient report 60 tablet 2    escitalopram (Lexapro) 20 mg tablet Take 1 tablet (20 mg) by mouth once daily. 90 tablet 0    lancets (OneTouch UltraSoft Lancets) misc Check BG as needed for hypoglycemia 100 each 2    melatonin 5 mg tablet,disintegrating Dissolve 5 mg in the mouth as needed at bedtime (sleep). 90 tablet 0    melatonin 5 mg tablet Take 1 tablet (5 mg) by mouth once daily at bedtime. (Patient not taking: Reported on 6/10/2025)      OneTouch Ultra Test strip CHECK BLOOD GLUCOSE AS NEEDED FOR SYMPTOMS OF LOW BLOOD SUGAR 100 strip 1    OneTouch Ultra2 Meter misc USE TO CHECK BLOOD SUGAR      testosterone 50 mg/5 gram (1 %) gel 1 packet daily 30 packet 5    traZODone (Desyrel) 100 mg tablet Take 1 tablet (100 mg) by mouth once daily at bedtime. 90 tablet 0      No current facility-administered medications on file prior to visit.     Vitals:      6/10/2025     1:06 PM 6/10/2025     2:49 PM 6/10/2025     3:28 PM 6/10/2025     3:42 PM 6/10/2025     4:00 PM 6/10/2025     4:15 PM 6/23/2025     1:36 PM   Vitals   Systolic 142 138 134 132 135 127 144   Diastolic 62 66 66 52 60 53 69   BP Location       Left arm   Heart Rate 49 42 51 46 45 45 57   Temp       36.7 °C (98.1 °F)   Resp 12 18 10 12 14 18 17   Weight (lb)       185.63   BMI       25.18 kg/m2   BSA (m2)       2.07 m2   Visit Report       Report      PHYSICAL EXAM:  Constitutional: alert, awake and oriented, not in acute distress   HEENT: moist mucous membranes, normal nose   Neck: supple, no lymphadenopathy   EYES: PERRL, EOM intact, conjunctiva normal  Skin: no jaundice, rash or erythema  Neurological: AAOx3, no gross focal deficit   Psychiatric: normal mood and behavior     Labs:  Lab Results   Component Value Date    WBC 4.9 06/23/2025    NEUTROABS 4.08 06/23/2025    IGABSOL 0.01 06/23/2025    LYMPHSABS 0.47 (L) 06/23/2025    MONOSABS 0.28 06/23/2025    EOSABS 0.03 06/23/2025    BASOSABS 0.04 06/23/2025    RBC 3.10 (L) 06/23/2025     (H) 06/23/2025    MCHC 33.1 06/23/2025    HGB 10.7 (L) 06/23/2025    HCT 32.3 (L) 06/23/2025     06/23/2025     Lab Results   Component Value Date    IRON 107 03/26/2025    TIBC 340 03/26/2025    FERRITIN 85 03/26/2025      Lab Results   Component Value Date     12/16/2024     Assessment:    57-year-old  male presented with long standing h/o normocytic anemia and leukopenia mainly lymphocytopenia    Compression fracture of thoracic spine    Fatigue disproportional to the degree of the anemia, weakness and body achiness, +KENIA    Myloid panel with DISEASE ASSOCIATED GENOMIC FINDINGS: DNMT3A p.P849S ([id number] c.[id number]>T) SF3B1 p.K700E ([id number] c.2098A>G).    S/P liver biopsy on 6/10/2025-normal     Plan:    Discussed with the patient and wife in detail  regarding diagnosis etiologies of anemia    will consider BM bx if hgb lower than 10 or if no other etiology has been found for his fatigue.     Currently having no B symptoms.     Discussed role of Bmbx; deferring for now    Recommend oral iron as tolerated.    Send for xray of back to r/o fractures     Referral to hepatology     Will repeat CBC in 6 m    f/u w/PCP    Patient verbalized understanding, and all his questions were answered to his satisfaction    RTC in 6 months    30 min spent with patient greater than 50 % of which was spent in consultation and coordination of care.

## 2025-06-24 LAB
PSA SERPL-MCNC: <0.1 NG/ML
VIT B12 SERPL-MCNC: 935 PG/ML (ref 211–911)

## 2025-06-30 LAB
TESTOSTERONE FREE (CHAN): 24.3 PG/ML (ref 35–155)
TESTOSTERONE,TOTAL,LC-MS/MS: 187 NG/DL (ref 250–1100)

## 2025-07-01 DIAGNOSIS — E29.1 HYPOGONADISM IN MALE: ICD-10-CM

## 2025-07-02 ENCOUNTER — PATIENT MESSAGE (OUTPATIENT)
Dept: ENDOCRINOLOGY | Facility: CLINIC | Age: 60
End: 2025-07-02
Payer: MEDICARE

## 2025-07-07 ENCOUNTER — DOCUMENTATION WITH CHARGES (OUTPATIENT)
Dept: PRIMARY CARE | Facility: CLINIC | Age: 60
End: 2025-07-07
Payer: MEDICARE

## 2025-07-07 DIAGNOSIS — F41.8 DEPRESSION WITH ANXIETY: Primary | ICD-10-CM

## 2025-07-23 ENCOUNTER — OFFICE VISIT (OUTPATIENT)
Dept: BEHAVIORAL HEALTH | Facility: HOSPITAL | Age: 60
End: 2025-07-23
Payer: MEDICARE

## 2025-07-23 DIAGNOSIS — F41.1 GAD (GENERALIZED ANXIETY DISORDER): Primary | ICD-10-CM

## 2025-07-23 DIAGNOSIS — Z86.59 HISTORY OF POSTTRAUMATIC STRESS DISORDER (PTSD): ICD-10-CM

## 2025-07-23 DIAGNOSIS — R63.4 UNINTENTIONAL WEIGHT LOSS: ICD-10-CM

## 2025-07-23 DIAGNOSIS — G47.00 INSOMNIA, UNSPECIFIED TYPE: ICD-10-CM

## 2025-07-23 DIAGNOSIS — F33.1 MAJOR DEPRESSIVE DISORDER, RECURRENT, MODERATE: ICD-10-CM

## 2025-07-23 DIAGNOSIS — F44.9 CONVERSION DISORDER: ICD-10-CM

## 2025-07-23 DIAGNOSIS — Z79.899 LONG-TERM CURRENT USE OF BENZODIAZEPINE: ICD-10-CM

## 2025-07-23 PROCEDURE — 90792 PSYCH DIAG EVAL W/MED SRVCS: CPT | Mod: AM | Performed by: PSYCHIATRY & NEUROLOGY

## 2025-07-23 PROCEDURE — 90792 PSYCH DIAG EVAL W/MED SRVCS: CPT | Performed by: PSYCHIATRY & NEUROLOGY

## 2025-07-23 RX ORDER — ESCITALOPRAM OXALATE 20 MG/1
20 TABLET ORAL DAILY
Qty: 30 TABLET | Refills: 2 | Status: SHIPPED | OUTPATIENT
Start: 2025-07-23 | End: 2025-10-21

## 2025-07-23 RX ORDER — TRAZODONE HYDROCHLORIDE 100 MG/1
100 TABLET ORAL NIGHTLY
Qty: 90 TABLET | Refills: 0 | Status: SHIPPED | OUTPATIENT
Start: 2025-07-23 | End: 2025-10-21

## 2025-07-23 NOTE — PROGRESS NOTES
"   Adult Outpatient Psychiatric Clinic       Established patient: yes  Reason for visit: Medication management   Other services: supportive therapy  Pharmacy: [facility]    Chief complaint: recurrent depression, panic attacks with agoraphobia, severe low testosterone, liver and pancreas illness    Mr presented on time today for his appointment. Mr Recinos gave consent for Mrs Recinos to be present and provide collateral.     Testosterone: Mr Recinos reported that since the last appointment, he has started testosterone replacement low therapy, his endocrinologist and nurse practitioner have been working together to manage, adjust testosterone level. Patient reported in the last week a formulation was changed from water based (which increased levels to double digits 28 on 3/12/25, still very low) to a current gel, the latter has caused increased palpations, heart racing, anxiety, and shaking. The will be reaching out to endo via secure chat via my chart today.    Mental health: Patient reported his mental health symptoms, history started with the lowe testosterone level. Patient reported nightmares have stopped a few weeks ago (stress dreams related to selling mother's home, she now lives across the street from them in a nh).  Symptoms are high anxiety, insomnia, depressive feeling, no motivation. We spoke about medication options, prozac was discussed for energy.    Patient has reported early:  Early, slow, improved sleep, with decreased awakenings on trazodone dose varying between 75mg-150mg at bedtime, adjust as needed  No panic attacks (1 mild one on venturing out of the car into [employer] which he did, due to improvement in overall anxiety, had a mild panic attack, but finished shopping)  Decreased in intensity, frequency of stress dreams  Denied the sensation of an entity bringing him down, today stated it is more of the \"blues\"    ROS:  Varying sleep  Fluctuating anxiety  Fluctuating energy      Current Outpatient " Medications   Medication Instructions    amLODIPine (NORVASC) 10 mg, oral, Daily    blood-glucose meter (OneTouch Ultra2 Meter) misc USE TO CHECK BLOOD SUGAR    clonazePAM (KLONOPIN) 0.25 mg, oral, 2 times daily PRN, Per patient report    escitalopram (LEXAPRO) 20 mg, oral, Daily    lancets (OneTouch UltraSoft Lancets) misc Check BG as needed for hypoglycemia    melatonin 5 mg, Nightly    melatonin 5 mg, oral, Nightly PRN    OneTouch Ultra Test strip CHECK BLOOD GLUCOSE AS NEEDED FOR SYMPTOMS OF LOW BLOOD SUGAR    OneTouch Ultra2 Meter misc USE TO CHECK BLOOD SUGAR    testosterone 50 mg/5 gram (1 %) gel 1 packet daily    traZODone (DESYREL) 100 mg, oral, Nightly      Vitals:  There were no vitals taken for this visit.    No results found for this or any previous visit (from the past 96 hours).     Mental Status Exam  Appearance: appropriate grooming and hygiene, appears stated age, weight loss, muscle loss  Attitude:  calm, cooperative  Behavior: appropriate eye contact  Movement: no psychomotor agitation or retardation. No EPS/TD. Normal gait and station. Normal muscle tone/bulk  Speech and language:  regular rate, rhythm, volume and tone, spontaneous, fluent.   Mood: depressed, anxious  Affect:  anxious  Thought process: Linear, organized, goal directed  Thought content: does not endorse suicidal ideation or homicidal ideations, questionable delusions of evil spirits vs past traumatic events, depressive cognitions  Perception: does not endorse auditory/visual hallucinations. Does not appear to be responding to hallucinatory stimuli.   Cognition:  alert and oriented to person, place, time, purpose, short and long term memory within normal limits, attention and concentration within normal limits  Insight:  fair, as patient recognizes symptoms of illness and need for recommended treatments.   Judgment:  can make reasonable decisions about ordinary activities of daily living and necessary medical care  recommendations     Assessment & Plan  LAVARADO (generalized anxiety disorder)    Major depressive disorder, recurrent, moderate    Insomnia, unspecified type    History of posttraumatic stress disorder (PTSD)    Unintentional weight loss    Conversion disorder    Long-term current use of benzodiazepine     Continue klonopin 0.5mg tab takes 1/2 prn 10-20 times a month if needed  Has not requested a refill today  Lexpro 20mg am, we discussed the option of cross titration lexapro to prozac, patient and wife stated they will consider and if they wish they will leave voice message with our office  Trazodone 75mg-100 mg qhs    Recommended oc male multivitamin, b complex    I spent 30 minutes in the professional and overall care of this patient.      Ema Anne MD

## 2025-07-30 ENCOUNTER — DOCUMENTATION (OUTPATIENT)
Dept: BEHAVIORAL HEALTH | Facility: CLINIC | Age: 60
End: 2025-07-30
Payer: MEDICARE

## 2025-07-30 NOTE — PROGRESS NOTES
Nonbillable note : left patient a general voicemail after pharmacy reached out , to me , for a refill on melatonin , to faciliate smooth transition with his transfer of care to another provider in psychiatry . Need to ensure he has his refill needs addressed , specifically for clonazepam script as he wont be able to receive refills per writer and would need the management of all psychotropic meds to be by the new provider. No refills sent. Patient did not notify writer of transfer of care . [facility]   7/31/25 :  ran oarrs , clonazepam filled 6/30/25 left a message for Dr Juana Anne for care coordination related to clonazepam script , will keep refill on file at this time so the patient does not run out abruptly until the script is transferred to be managed by new provider vs being discontinued . [facility]

## 2025-07-31 NOTE — PROGRESS NOTES
Hepatology: Follow up visit note     Patient: Clifford Recinos, a 60 y.o. year old male presents for follow up to review results of diagnostic testing.   PCP: HUMBERTO Limon-CNP    History of Present Illness   Clifford Recinos presents for a follow up visit today. He is accompanied by his spouse for today's visit.   He was initially seen in March 2025 for imaging findings raising concern for cirrhosis.     He was noted to have changes of cirrhosis on imaging in 2024 and 2025 with CT. Prior imaging from 2012/2013 with fatty liver on imaging.   He had symptoms of diarrhea- loose and runny stools, occurring 2-5 times a day. Denies periods of constipation or no Bms. Denies having abdominal pain, rectal bleeding with diarrhea. No abx in the last 6 months. He had diarrhea which would occur post prandially after gallbladder surgery- could not use cholestyramine due to interaction/absorption issues with some of his meds. His appetite was not as robust and he had lost up to 50lbs of weight in the last 1 year or so.      Today's visit: Here for a visit after his testing.  Denies acute symptoms.  Notes that he continues to have fatigue.  He underwent a TJ liver bx after both ELF and fibroscan ruled out significant or advanced fibrosis.    No abdominal distention or lower extremity edema.    Review of Systems   Constitutional/ General: No fever, fatigue +  Eyes: no yellow discoloration  ENT: normal   Cardiovascular: no chest pain   Respiratory: no shortness of breath  Gastrointestinal: as per HPI  Integumentary: no rashes   Neurologic: no weakness or numbness of extremities  Psychiatric: no mood fluctuations  Musculoskeletal: no joint swelling   Genitourinary: no dysuria, no hematuria    All other systems have been reviewed and are negative except as noted in the HPI and above.    PMHx: HTN, anxiety, depression, back pain, pancreatitis in his younger years (27 yrs of age)- calcifications.   PSHx: cholecystectomy, appendectomy.     Social hx: rare alcohol use- in the past once in a few months (1-2 beers a summer), denies hx of smoking, denies hx of illicit substance use.   Travel history: Notes that a long while ago he did travel to [place] and ate animal meat from uncertain sources (possibly rat meat)  Family hx: denies history of hepatobiliary disease or malignancy in the family.     Medications     Current Outpatient Medications   Medication Instructions    amLODIPine (NORVASC) 10 mg, oral, Daily    b complex 0.4 mg tablet 1 tablet, Daily    blood-glucose meter (OneTouch Ultra2 Meter) misc USE TO CHECK BLOOD SUGAR    cholecalciferol (VITAMIN D-3) 25 mcg, Daily    clonazePAM (KLONOPIN) 0.25 mg, oral, 2 times daily PRN, Per patient report    escitalopram (LEXAPRO) 20 mg, oral, Daily    lancets (OneTouch UltraSoft Lancets) misc Check BG as needed for hypoglycemia    melatonin 5 mg, Nightly    multivitamin tablet 1 tablet, Daily    OneTouch Ultra Test strip CHECK BLOOD GLUCOSE AS NEEDED FOR SYMPTOMS OF LOW BLOOD SUGAR    OneTouch Ultra2 Meter misc USE TO CHECK BLOOD SUGAR    testosterone 50 mg/5 gram (1 %) gel 1 packet daily    traZODone (DESYREL) 100 mg, oral, Nightly      Physical Examination     Vitals:    08/01/25 1507   BP: 144/71   Pulse: 62   SpO2: 97%     Vitals:    08/01/25 1507   Weight: 83.9 kg (185 lb)     Body mass index is 25.09 kg/m².    Constitutional: awake, alert   Eyes: EOMI, anicteric sclera   Respiratory: Bilateral air entry equal   Cardiovascular: regular rate and rhythm, no lower extremity edema  Abdomen: soft, non tender, non distended, no free fluid wave appreciated, bowel sounds present   Neurologic: gross motor strength intact, no asterixis  Psychiatric: alert, appropriate mood and affect, oriented to time/place/person    Labs     Lab Results   Component Value Date     06/23/2025    K 4.0 06/23/2025    CREATININE 0.65 06/23/2025    BILITOT 1.4 (H) 06/23/2025    ALBUMIN 4.8 06/23/2025    ALT 8 (L) 06/23/2025     AST 10 06/23/2025    ALKPHOS 36 06/23/2025    INR 1.0 05/29/2025    AFP 3.6 03/26/2025    HGB 10.7 (L) 06/23/2025     06/23/2025      Computed MELD 3.0 unavailable. One or more values for this score either were not found within the given timeframe or did not fit some other criterion.  Computed MELD-Na unavailable. One or more values for this score either were not found within the given timeframe or did not fit some other criterion.     Lab Results   Component Value Date    HEPBSAG NONREACTIVE 07/03/2023    HEPBSAB <5 (L) 03/26/2025    HEPBCAB NON-REACTIVE 03/26/2025    HEPCAB NONREACTIVE 07/03/2023    HEPATOT NON-REACTIVE 03/26/2025    KENIA NEGATIVE 03/26/2025    ANATITER 1:80 08/10/2022    ASMAB NEGATIVE 03/26/2025    MITOAB NEGATIVE 03/26/2025    IGG 1,074 03/26/2025     03/26/2025     03/26/2025    X0MREOIV SEE NOTE 03/26/2025    CERULOPLSM 20 03/26/2025    FERRITIN 144 06/23/2025    IRONSAT 37 06/23/2025    IRON 122 06/23/2025    TIBC 329 06/23/2025    TTGA <1.0 03/26/2025     Liver bx June 2025:   FINAL DIAGNOSIS   A.  Liver, biopsy:  - Hepatic parenchyma with no specific diagnostic alteration.  - No evidence of fibrosis.  - See microscopic description.     MICROSCOPIC DESCRIPTION:     The biopsy shows preservation of the overall hepatic architecture.  Significant portal or lobular inflammation is not identified.  The interlobular bile ducts are intact without evidence of injury.  There is no evidence of cholestasis or steatosis.  Trichrome stain (blocks A1 and A2) does not show evidence of fibrosis. Reticulin stain shows intact cord thickness. The hepatic vasculature is unremarkable. The iron stain is negative for storage iron.  PAS stain after diastase digestion is negative for diagnostic cytoplasmic inclusions. The case findings were reviewed with Dr. Katelynn Hoffman on 6/17/2025.     Lab Results   Component Value Date    ELFSCORE 9.15 03/26/2025    PANCRELASTFE >800 03/27/2025    LABOVA  SEE NOTE 03/27/2025    CALPS 20 03/27/2025     Imaging   FibroSCAN March 2025: Median liver stiffness of 7.0 kPa with an IQR of 17% and  dB/m.   This study estimates a fibrosis stage of 0-1.      TJLB June 2025: IMPRESSION:  1. Technically successful transjugular liver biopsy- 3 20 mm core biopsies were sent for laboratory analysis.  2. Indirect portosystemic pressure measurements identified a portosystemic gradient of 0 mmHg and the hepatic venous pressure gradient of 2 mmHg.    CTAP Jan 2025: IMPRESSION: No acute abnormality within the abdomen or pelvis.    Cirrhotic liver morphology with splenomegaly and recanalized umbilical vein indicative of portal hypertension. No ascites.    Sept 2024 CTAP: LIVER: The liver demonstrates homogeneous attenuation without evidence of focal liver lesions. The liver margin is mildly lobulated. Correlate for cirrhosis.    [facility] CTAP 2013: IMPRESSION: 1. Diffuse fatty liver, not significantly changed. 2. Small stone in the gallbladder neck.  CTAP July 2012: CONCLUSION: QUESTION OF TINY STONES WITHIN THE GALLBLADDER. NO ADDITIONAL FINDINGS TO SUGGEST CHOLECYSTITIS. MILD FATTY INFILTRATION OF THE LIVER.     Echo April 2024: CONCLUSIONS:   1. Left ventricular systolic function is normal with a 60-65% estimated ejection fraction.   2. The left atrium is moderate to severely dilated.   3. There is mild mitral and tricuspid regurgitation.   4. There is no evidence of a patent foramen ovale.   5. The estimated pulmonary artery pressure is mildly elevated with the RVSP at 48.7 mmHg.    Colonoscopy June 2024: Impression  2 subcentimeter polyps were removed  (grade 1) hemorrhoid  The ileocecal valve and cecum appeared normal. Performed random biopsy using biopsy forceps.  EGD June 2024:   Impression  Ring in the GE junction  Mild erythematous mucosa in the antrum; performed cold forceps biopsy  Hiatal hernia  The duodenum appeared normal. Performed random biopsy to rule out celiac  disease.  Prominent veins in lower esophagus but not typical with esophageal varices      No bleeding/ulcer up to D2     FINAL DIAGNOSIS   A. DUODENUM SECOND PART BIOPSY: SMALL INTESTINAL MUCOSA WITH NO SIGNIFICANT PATHOLOGIC FINDINGS.     B. STOMACH ANTRUM BIOPSY: GASTRIC MUCOSA WITH NO SIGNIFICANT PATHOLOGIC FINDINGS, SEE NOTE.  Note: No microorganisms are identified.     C. COLON - DESCENDING POLYP: MULTIPLE FRAGMENTS OF TUBULAR ADENOMA.     D. COLON  - RANDOM BIOPSY: COLONIC MUCOSA WITH NO SIGNIFICANT PATHOLOGIC FINDINGS.     E. COLON - TRANSVERSE POLYP: TUBULAR ADENOMA.         Assessment and Plan    Clifford Recinos, a 60 y.o. year old male presents for follow up visit after diagnostic testing for concern for cirrhosis. I have reviewed pertinent provider notes, labs and imaging studies. Discussed results and their interpretation with the patient today.    Encounter Diagnosis   Name Primary?    Abnormal liver diagnostic imaging Yes     Orders Placed This Encounter   Procedures    MR liver w and wo IV contrast    Hepatic Function Panel    Schistosoma Antibody, IgG     # Imaging in 2024/25 has raise concern for cirrhosis, given nodular contour of the liver.    Risk factors for development of advanced chronic liver disease include steatotic liver disease likely secondary to metabolic dysfunction given prior history of elevated BMI, hypertension.   Interestingly his liver enzymes, albumin within normal range, as are platelet count and INR as well. Bili total mild elevation recently in June- with prior normal levels. Prior EGD with findings of prominent veins in the lower esophagus but endoscopist noted these were not typical of esophageal varices.       Other than abnormalities in hepatic morphology on imaging, noninvasive testing for fibrosis both with elastography as well as serologic testing has excluded fibrosis.  This was again verified by transjugular liver biopsy where histopathology showed normal hepatic  architecture without fibrosis or NRH.  Additionally there is no evidence of sinusoidal portal hypertension based on HVPG measurements.    As a result at this time there is no concrete evidence to provide a diagnosis of cirrhosis.  Whether he has a presinusoidal/prehepatic etiology of imaging findings could be considered.  There is no obvious large vessel thrombosis that is noted on imaging.    At this time he does not have overtly decompensated symptoms from his imaging findings.  His hepatic synthetic function remains intact.    -Could consider venography study to evaluate for prehepatic etiologies but in the absence of overt symptoms- can consider non invasive monitoring.   -In the interim recommend every 6 monthly LFT screening and check schistosoma Ab with next lab check.   -Recommend repeating an MRI liver study for follow up of vasculature and hepatic morphology in Dec 2025.     # Stool studies for evaluation of chronic diarrhea have been negative.  No evidence of elevation in fecal calprotectin, pancreatic insufficiency based on elastase level.  No evidence of atypical infections.  He has had a colonoscopy in the past year without evidence of colitis on endoscopic evaluation and histopathology.  This excludes the diagnosis of IBD/colonic involvement, microscopic colitis.      Follow up in 5-6 months.

## 2025-08-01 ENCOUNTER — APPOINTMENT (OUTPATIENT)
Dept: GASTROENTEROLOGY | Facility: CLINIC | Age: 60
End: 2025-08-01
Payer: MEDICARE

## 2025-08-01 VITALS
SYSTOLIC BLOOD PRESSURE: 144 MMHG | DIASTOLIC BLOOD PRESSURE: 71 MMHG | WEIGHT: 185 LBS | HEART RATE: 62 BPM | BODY MASS INDEX: 25.06 KG/M2 | HEIGHT: 72 IN | OXYGEN SATURATION: 97 %

## 2025-08-01 DIAGNOSIS — R93.2 ABNORMAL LIVER DIAGNOSTIC IMAGING: Primary | ICD-10-CM

## 2025-08-01 PROBLEM — K74.60 CIRRHOSIS OF LIVER WITHOUT ASCITES (MULTI): Status: RESOLVED | Noted: 2025-01-03 | Resolved: 2025-08-01

## 2025-08-01 PROCEDURE — G2211 COMPLEX E/M VISIT ADD ON: HCPCS | Performed by: STUDENT IN AN ORGANIZED HEALTH CARE EDUCATION/TRAINING PROGRAM

## 2025-08-01 PROCEDURE — 3078F DIAST BP <80 MM HG: CPT | Performed by: STUDENT IN AN ORGANIZED HEALTH CARE EDUCATION/TRAINING PROGRAM

## 2025-08-01 PROCEDURE — 3008F BODY MASS INDEX DOCD: CPT | Performed by: STUDENT IN AN ORGANIZED HEALTH CARE EDUCATION/TRAINING PROGRAM

## 2025-08-01 PROCEDURE — 99213 OFFICE O/P EST LOW 20 MIN: CPT | Performed by: STUDENT IN AN ORGANIZED HEALTH CARE EDUCATION/TRAINING PROGRAM

## 2025-08-01 PROCEDURE — 3077F SYST BP >= 140 MM HG: CPT | Performed by: STUDENT IN AN ORGANIZED HEALTH CARE EDUCATION/TRAINING PROGRAM

## 2025-08-01 RX ORDER — BISMUTH SUBSALICYLATE 262 MG
1 TABLET,CHEWABLE ORAL DAILY
COMMUNITY

## 2025-08-01 RX ORDER — MULTIVITAMIN/IRON/FOLIC ACID 18MG-0.4MG
1 TABLET ORAL DAILY
COMMUNITY

## 2025-08-01 RX ORDER — VIT C/E/ZN/COPPR/LUTEIN/ZEAXAN 250MG-90MG
25 CAPSULE ORAL DAILY
COMMUNITY

## 2025-08-01 NOTE — PATIENT INSTRUCTIONS
Clifford Recinos,     Thank you for coming in for your hepatology office visit today.   As per our discussion, I recommend:     Have blood work done and an MRI liver done in Dec 2025. Call [phone] to schedule the MRI liver.  Based on the work up- you do not have cirrhosis or liver disease.      I would like to see you back in the office in 5-6 months after next blood work/next imaging.  If you are not provided with a date for your follow up visit at the end of your encounter today at the check out desk, I would encourage you to call [phone] to schedule your appointment with me.   If you have any trouble or need assistance with having this done, please reach out to my office staff at [phone] or my nurse coordinator at [phone] (Ms Anna SINGLETON).     I look forward to seeing you again. Thank you for allowing me to participate in your care.     Sincerely,  Katelynn Hoffman MD  Hepatology

## 2025-08-24 DIAGNOSIS — E16.2 HYPOGLYCEMIA: ICD-10-CM

## 2025-08-25 RX ORDER — BLOOD SUGAR DIAGNOSTIC
STRIP MISCELLANEOUS
Qty: 100 STRIP | Refills: 2 | Status: SHIPPED | OUTPATIENT
Start: 2025-08-25

## 2025-08-27 ENCOUNTER — OFFICE VISIT (OUTPATIENT)
Dept: BEHAVIORAL HEALTH | Facility: HOSPITAL | Age: 60
End: 2025-08-27
Payer: MEDICARE

## 2025-08-27 DIAGNOSIS — F33.1 MAJOR DEPRESSIVE DISORDER, RECURRENT, MODERATE: ICD-10-CM

## 2025-08-27 DIAGNOSIS — F41.1 GAD (GENERALIZED ANXIETY DISORDER): Primary | ICD-10-CM

## 2025-08-27 DIAGNOSIS — Z79.899 LONG-TERM CURRENT USE OF BENZODIAZEPINE: ICD-10-CM

## 2025-08-27 DIAGNOSIS — G47.00 INSOMNIA, UNSPECIFIED TYPE: ICD-10-CM

## 2025-08-27 PROCEDURE — G2211 COMPLEX E/M VISIT ADD ON: HCPCS | Performed by: PSYCHIATRY & NEUROLOGY

## 2025-08-27 PROCEDURE — 99214 OFFICE O/P EST MOD 30 MIN: CPT | Performed by: PSYCHIATRY & NEUROLOGY

## 2025-08-27 PROCEDURE — 99214 OFFICE O/P EST MOD 30 MIN: CPT | Mod: AM | Performed by: PSYCHIATRY & NEUROLOGY

## 2025-08-27 RX ORDER — QUETIAPINE FUMARATE 25 MG/1
12.5 TABLET, FILM COATED ORAL DAILY PRN
Qty: 15 TABLET | Refills: 0 | Status: SHIPPED | OUTPATIENT
Start: 2025-08-27 | End: 2025-09-06

## 2025-09-04 ENCOUNTER — APPOINTMENT (OUTPATIENT)
Dept: PRIMARY CARE | Facility: CLINIC | Age: 60
End: 2025-09-04
Payer: MEDICARE

## 2025-09-04 ASSESSMENT — ENCOUNTER SYMPTOMS
DIZZINESS: 0
COLOR CHANGE: 0
SEIZURES: 0
DIFFICULTY URINATING: 0
WOUND: 0
TROUBLE SWALLOWING: 0
JOINT SWELLING: 0
OCCASIONAL FEELINGS OF UNSTEADINESS: 0
CHILLS: 0
FATIGUE: 0
BACK PAIN: 1
ABDOMINAL PAIN: 0
BRUISES/BLEEDS EASILY: 0
SHORTNESS OF BREATH: 0
WHEEZING: 0
EYE PAIN: 0
LOSS OF SENSATION IN FEET: 0
WEAKNESS: 0
DIARRHEA: 0
CONSTIPATION: 0
PALPITATIONS: 1
MYALGIAS: 0
ADENOPATHY: 0
ABDOMINAL DISTENTION: 0
COUGH: 0
FEVER: 0
DEPRESSION: 0
NAUSEA: 0
HEADACHES: 0
TREMORS: 1

## 2025-09-04 ASSESSMENT — ACTIVITIES OF DAILY LIVING (ADL)
BATHING: INDEPENDENT
MANAGING_FINANCES: INDEPENDENT
DOING_HOUSEWORK: INDEPENDENT
DRESSING: INDEPENDENT
TAKING_MEDICATION: INDEPENDENT
GROCERY_SHOPPING: INDEPENDENT

## 2025-09-04 ASSESSMENT — PATIENT HEALTH QUESTIONNAIRE - PHQ9
SUM OF ALL RESPONSES TO PHQ9 QUESTIONS 1 AND 2: 1
2. FEELING DOWN, DEPRESSED OR HOPELESS: SEVERAL DAYS
1. LITTLE INTEREST OR PLEASURE IN DOING THINGS: NOT AT ALL

## 2025-09-07 PROBLEM — Z00.00 MEDICARE ANNUAL WELLNESS VISIT, SUBSEQUENT: Status: RESOLVED | Noted: 2024-10-15 | Resolved: 2025-09-07

## 2025-12-18 ENCOUNTER — APPOINTMENT (OUTPATIENT)
Dept: ENDOCRINOLOGY | Facility: CLINIC | Age: 60
End: 2025-12-18
Payer: MEDICARE

## 2025-12-29 ENCOUNTER — APPOINTMENT (OUTPATIENT)
Dept: PRIMARY CARE | Facility: CLINIC | Age: 60
End: 2025-12-29
Payer: MEDICARE

## 2026-02-13 ENCOUNTER — APPOINTMENT (OUTPATIENT)
Dept: GASTROENTEROLOGY | Facility: CLINIC | Age: 61
End: 2026-02-13
Payer: MEDICARE

## 2026-03-26 ENCOUNTER — APPOINTMENT (OUTPATIENT)
Dept: ENDOCRINOLOGY | Facility: CLINIC | Age: 61
End: 2026-03-26
Payer: MEDICARE